# Patient Record
Sex: MALE | Race: WHITE | NOT HISPANIC OR LATINO | Employment: OTHER | URBAN - NONMETROPOLITAN AREA
[De-identification: names, ages, dates, MRNs, and addresses within clinical notes are randomized per-mention and may not be internally consistent; named-entity substitution may affect disease eponyms.]

---

## 2023-04-14 ENCOUNTER — MEDICAL CORRESPONDENCE (OUTPATIENT)
Dept: MRI IMAGING | Facility: HOSPITAL | Age: 73
End: 2023-04-14

## 2023-04-25 ENCOUNTER — HOSPITAL ENCOUNTER (OUTPATIENT)
Dept: MRI IMAGING | Facility: HOSPITAL | Age: 73
Discharge: HOME | End: 2023-04-25
Attending: SPECIALIST | Admitting: SPECIALIST
Payer: MEDICARE

## 2023-04-25 DIAGNOSIS — M25.562 PAIN AND SWELLING OF LEFT KNEE: ICD-10-CM

## 2023-04-25 DIAGNOSIS — M25.362 OTHER INSTABILITY, LEFT KNEE: ICD-10-CM

## 2023-04-25 DIAGNOSIS — M25.462 PAIN AND SWELLING OF LEFT KNEE: ICD-10-CM

## 2023-04-25 PROCEDURE — 73721 MRI JNT OF LWR EXTRE W/O DYE: CPT | Mod: LT

## 2023-10-02 ENCOUNTER — TRANSFERRED RECORDS (OUTPATIENT)
Dept: HEALTH INFORMATION MANAGEMENT | Facility: CLINIC | Age: 73
End: 2023-10-02

## 2023-10-02 LAB — PHQ9 SCORE: 0

## 2023-10-09 ENCOUNTER — MEDICAL CORRESPONDENCE (OUTPATIENT)
Dept: HEALTH INFORMATION MANAGEMENT | Facility: CLINIC | Age: 73
End: 2023-10-09

## 2023-10-09 ENCOUNTER — TRANSFERRED RECORDS (OUTPATIENT)
Dept: HEALTH INFORMATION MANAGEMENT | Facility: CLINIC | Age: 73
End: 2023-10-09

## 2023-10-09 LAB — COLOGUARD-ABSTRACT: POSITIVE

## 2023-11-13 RX ORDER — TRIAMTERENE AND HYDROCHLOROTHIAZIDE 37.5; 25 MG/1; MG/1
1 CAPSULE ORAL EVERY MORNING
COMMUNITY

## 2023-11-15 ENCOUNTER — OFFICE VISIT (OUTPATIENT)
Dept: SURGERY | Facility: OTHER | Age: 73
End: 2023-11-15
Attending: SURGERY
Payer: MEDICARE

## 2023-11-15 ENCOUNTER — DOCUMENTATION ONLY (OUTPATIENT)
Dept: OTHER | Facility: CLINIC | Age: 73
End: 2023-11-15

## 2023-11-15 VITALS
HEART RATE: 58 BPM | BODY MASS INDEX: 31.4 KG/M2 | HEIGHT: 69 IN | WEIGHT: 212 LBS | SYSTOLIC BLOOD PRESSURE: 136 MMHG | DIASTOLIC BLOOD PRESSURE: 84 MMHG | TEMPERATURE: 96.9 F | OXYGEN SATURATION: 98 %

## 2023-11-15 DIAGNOSIS — R19.5 POSITIVE COLORECTAL CANCER SCREENING USING COLOGUARD TEST: Primary | ICD-10-CM

## 2023-11-15 PROCEDURE — 99203 OFFICE O/P NEW LOW 30 MIN: CPT | Performed by: SURGERY

## 2023-11-15 PROCEDURE — G0463 HOSPITAL OUTPT CLINIC VISIT: HCPCS | Mod: 25

## 2023-11-15 RX ORDER — BISACODYL 5 MG/1
10 TABLET, DELAYED RELEASE ORAL SEE ADMIN INSTRUCTIONS
Qty: 4 TABLET | Refills: 0 | Status: CANCELLED | OUTPATIENT
Start: 2023-11-15

## 2023-11-15 RX ORDER — SILDENAFIL 100 MG/1
TABLET, FILM COATED ORAL
COMMUNITY
Start: 2023-11-05

## 2023-11-15 RX ORDER — IBUPROFEN 200 MG
200 TABLET ORAL DAILY
COMMUNITY

## 2023-11-15 ASSESSMENT — PAIN SCALES - GENERAL: PAINLEVEL: NO PAIN (0)

## 2023-11-15 NOTE — PROGRESS NOTES
"CLINIC NOTE - CONSULT  11/15/2023    Patient : Nicolas Malone  Referring Physician : No ref. provider found    Reason for Referral : Upper and lower endoscopy    This is a 72 year old male with a need for an upper and lower endoscopy.  Upper and lower endoscopy is needed for Positive Stool Guaiac.      Last EGD : never  History of GERD : NO  History of PUD : NO  On PPI's : NO   Drug and Dose : N/A  History of dysphagia : NO   Dysphagia to solids greater than liquids : Not Applicable  Hematemesis : NO  Melena : NO  Does take NSAIS daliy    Last colonoscopy : Never  Family history of colon cancer : NO  Family history of colon polyps : NO  Personal history of colon cancer : NO  Personal history of colon polyps : NO  Rectal bleeding : NO  Changes in bowel habits :NO  Personal history of inflammatory bowel disease : NO    Past Medical History:  No past medical history on file.    Past Surgical History:  No past surgical history on file.    Family History History:  No family history on file.    History of Tobacco Use:  History   Smoking Status    Never   Smokeless Tobacco    Never       Current Medications:  Current Outpatient Medications   Medication Sig Dispense Refill    ibuprofen (ADVIL/MOTRIN) 200 MG tablet Take 200 mg by mouth daily      sildenafil (VIAGRA) 100 MG tablet TAKE 1 TABLET BY MOUTH 1 TO 4 HOURS BEFORE SEXUAL ACTIVITY      triamterene-HCTZ (DYAZIDE) 37.5-25 MG capsule Take 1 capsule by mouth every morning         Allergies:  Allergies   Allergen Reactions    Penicillins      Childhood       ROS:  Pertinent items are noted in HPI.  All other systems are negative.    PHYSICAL EXAM:     Vital signs: /84 (BP Location: Right arm, Cuff Size: Adult Regular)   Pulse 58   Temp 96.9  F (36.1  C) (Tympanic)   Ht 1.753 m (5' 9\")   Wt 96.2 kg (212 lb)   SpO2 98%   BMI 31.31 kg/m     Weight: [unfilled]   BMI: Body mass index is 31.31 kg/m .   General: Normal, healthy, cooperative, in no acute distress, " alert   Skin: no jaundice   HEENT: CLOTILDE and PARVEEN   Neck: supple     Assessment:   72 year old male with need for upper endoscopy and lower endoscopy for Positive Stool Guaiac and a history of NSAID use:    Plan:   Will schedule an esophagogastroduodenoscopy and colonoscopy.  The procedures with their risks, benefits and alternatives were explained.  Risks include but are not limited to bleeding, perforation, missing lesions, need for additional procedures, reaction to anesthesia.  All the patients questions were answered.  The patient consents to proceed.  The procedures will be scheduled.

## 2023-11-15 NOTE — PATIENT INSTRUCTIONS
Thank you for allowing Dr. Waddell and our surgical team to participate in your care. Please call our health unit coordinator at [phone] with scheduling questions or the nurse at [phone] with any other questions or concerns.    You have been scheduled for: UPPER ENDOSCOPY AND COLONOSCOPY with  on NOVEMBER 20TH    SURGERY NURSE TO CALL NOVEMBER 16TH 10:30AM    .   You will use MIRALAX  bowel prep.      Please see handout for additional instruction.  You WILL NOT  need a pre-operative appointment with your primary care provider.  You may call [phone] or [phone] with any questions.

## 2023-11-16 ENCOUNTER — ANESTHESIA EVENT (OUTPATIENT)
Dept: SURGERY | Facility: HOSPITAL | Age: 73
End: 2023-11-16
Payer: MEDICARE

## 2023-11-16 ASSESSMENT — LIFESTYLE VARIABLES: TOBACCO_USE: 1
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
## 2023-11-20 ENCOUNTER — ANESTHESIA (OUTPATIENT)
Dept: SURGERY | Facility: HOSPITAL | Age: 73
End: 2023-11-20
Payer: MEDICARE

## 2023-11-20 ENCOUNTER — HOSPITAL ENCOUNTER (OUTPATIENT)
Facility: HOSPITAL | Age: 73
Discharge: HOME | End: 2023-11-20
Attending: SURGERY | Admitting: SURGERY
Payer: MEDICARE

## 2023-11-20 VITALS
TEMPERATURE: 97.1 F | SYSTOLIC BLOOD PRESSURE: 152 MMHG | WEIGHT: 215 LBS | RESPIRATION RATE: 18 BRPM | OXYGEN SATURATION: 95 % | HEIGHT: 68 IN | DIASTOLIC BLOOD PRESSURE: 88 MMHG | BODY MASS INDEX: 32.58 KG/M2 | HEART RATE: 53 BPM

## 2023-11-20 PROCEDURE — 250N000011 HC RX IP 250 OP 636: Performed by: NURSE ANESTHETIST, CERTIFIED REGISTERED

## 2023-11-20 PROCEDURE — 250N000009 HC RX 250: Performed by: NURSE ANESTHETIST, CERTIFIED REGISTERED

## 2023-11-20 PROCEDURE — 88341 IMHCHEM/IMCYTCHM EA ADD ANTB: CPT | Mod: 26 | Performed by: PATHOLOGY

## 2023-11-20 PROCEDURE — 45385 COLONOSCOPY W/LESION REMOVAL: CPT | Mod: PT | Performed by: SURGERY

## 2023-11-20 PROCEDURE — 88305 TISSUE EXAM BY PATHOLOGIST: CPT | Mod: TC | Performed by: SURGERY

## 2023-11-20 PROCEDURE — 88305 TISSUE EXAM BY PATHOLOGIST: CPT | Mod: 26 | Performed by: PATHOLOGY

## 2023-11-20 PROCEDURE — 99100 ANES PT EXTEME AGE<1 YR&>70: CPT | Performed by: NURSE ANESTHETIST, CERTIFIED REGISTERED

## 2023-11-20 PROCEDURE — 370N000017 HC ANESTHESIA TECHNICAL FEE, PER MIN: Performed by: SURGERY

## 2023-11-20 PROCEDURE — 710N000012 HC RECOVERY PHASE 2, PER MINUTE: Performed by: SURGERY

## 2023-11-20 PROCEDURE — 272N000001 HC OR GENERAL SUPPLY STERILE: Performed by: SURGERY

## 2023-11-20 PROCEDURE — 45385 COLONOSCOPY W/LESION REMOVAL: CPT | Performed by: NURSE ANESTHETIST, CERTIFIED REGISTERED

## 2023-11-20 PROCEDURE — 360N000075 HC SURGERY LEVEL 2, PER MIN: Performed by: SURGERY

## 2023-11-20 PROCEDURE — 88342 IMHCHEM/IMCYTCHM 1ST ANTB: CPT | Mod: 26 | Performed by: PATHOLOGY

## 2023-11-20 PROCEDURE — 999N000141 HC STATISTIC PRE-PROCEDURE NURSING ASSESSMENT: Performed by: SURGERY

## 2023-11-20 PROCEDURE — 43239 EGD BIOPSY SINGLE/MULTIPLE: CPT | Performed by: SURGERY

## 2023-11-20 RX ORDER — ONDANSETRON 4 MG/1
4 TABLET, ORALLY DISINTEGRATING ORAL EVERY 30 MIN PRN
Status: DISCONTINUED | OUTPATIENT
Start: 2023-11-20 | End: 2023-11-20 | Stop reason: HOSPADM

## 2023-11-20 RX ORDER — LIDOCAINE 40 MG/G
CREAM TOPICAL
Status: DISCONTINUED | OUTPATIENT
Start: 2023-11-20 | End: 2023-11-20 | Stop reason: HOSPADM

## 2023-11-20 RX ORDER — GLYCOPYRROLATE 0.2 MG/ML
INJECTION, SOLUTION INTRAMUSCULAR; INTRAVENOUS PRN
Status: DISCONTINUED | OUTPATIENT
Start: 2023-11-20 | End: 2023-11-20

## 2023-11-20 RX ORDER — PROPOFOL 10 MG/ML
INJECTION, EMULSION INTRAVENOUS PRN
Status: DISCONTINUED | OUTPATIENT
Start: 2023-11-20 | End: 2023-11-20

## 2023-11-20 RX ORDER — ONDANSETRON 2 MG/ML
4 INJECTION INTRAMUSCULAR; INTRAVENOUS EVERY 30 MIN PRN
Status: DISCONTINUED | OUTPATIENT
Start: 2023-11-20 | End: 2023-11-20 | Stop reason: HOSPADM

## 2023-11-20 RX ORDER — OXYCODONE HYDROCHLORIDE 5 MG/1
5 TABLET ORAL
Status: CANCELLED | OUTPATIENT
Start: 2023-11-20

## 2023-11-20 RX ORDER — SODIUM CHLORIDE, SODIUM LACTATE, POTASSIUM CHLORIDE, CALCIUM CHLORIDE 600; 310; 30; 20 MG/100ML; MG/100ML; MG/100ML; MG/100ML
INJECTION, SOLUTION INTRAVENOUS CONTINUOUS
Status: DISCONTINUED | OUTPATIENT
Start: 2023-11-20 | End: 2023-11-20 | Stop reason: HOSPADM

## 2023-11-20 RX ADMIN — PROPOFOL 10 MG: 10 INJECTION, EMULSION INTRAVENOUS at 12:46

## 2023-11-20 RX ADMIN — PROPOFOL 10 MG: 10 INJECTION, EMULSION INTRAVENOUS at 12:44

## 2023-11-20 RX ADMIN — PROPOFOL 20 MG: 10 INJECTION, EMULSION INTRAVENOUS at 12:48

## 2023-11-20 RX ADMIN — PROPOFOL 10 MG: 10 INJECTION, EMULSION INTRAVENOUS at 12:54

## 2023-11-20 RX ADMIN — PROPOFOL 10 MG: 10 INJECTION, EMULSION INTRAVENOUS at 12:53

## 2023-11-20 RX ADMIN — PROPOFOL 10 MG: 10 INJECTION, EMULSION INTRAVENOUS at 12:42

## 2023-11-20 RX ADMIN — PROPOFOL 20 MG: 10 INJECTION, EMULSION INTRAVENOUS at 12:43

## 2023-11-20 RX ADMIN — PROPOFOL 20 MG: 10 INJECTION, EMULSION INTRAVENOUS at 12:52

## 2023-11-20 RX ADMIN — PROPOFOL 20 MG: 10 INJECTION, EMULSION INTRAVENOUS at 12:50

## 2023-11-20 RX ADMIN — PROPOFOL 100 MG: 10 INJECTION, EMULSION INTRAVENOUS at 12:41

## 2023-11-20 RX ADMIN — PROPOFOL 10 MG: 10 INJECTION, EMULSION INTRAVENOUS at 12:47

## 2023-11-20 RX ADMIN — PROPOFOL 10 MG: 10 INJECTION, EMULSION INTRAVENOUS at 12:45

## 2023-11-20 RX ADMIN — PROPOFOL 20 MG: 10 INJECTION, EMULSION INTRAVENOUS at 12:51

## 2023-11-20 RX ADMIN — PROPOFOL 20 MG: 10 INJECTION, EMULSION INTRAVENOUS at 12:49

## 2023-11-20 RX ADMIN — GLYCOPYRROLATE 0.2 MG: 0.2 INJECTION, SOLUTION INTRAMUSCULAR; INTRAVENOUS at 12:55

## 2023-11-20 ASSESSMENT — ACTIVITIES OF DAILY LIVING (ADL): ADLS_ACUITY_SCORE: 35

## 2023-11-20 NOTE — ANESTHESIA POSTPROCEDURE EVALUATION
Patient: Nicolas Malone    Procedure: Procedure(s):  COLONOSCOPY with polypectomy,  EGD WITH BIOPSY       Anesthesia Type:  MAC    Note:  Disposition: Outpatient   Postop Pain Control: Uneventful            Sign Out: Well controlled pain   PONV: No   Neuro/Psych: Uneventful            Sign Out: Acceptable/Baseline neuro status   Airway/Respiratory: Uneventful            Sign Out: Acceptable/Baseline resp. status   CV/Hemodynamics: Uneventful            Sign Out: Acceptable CV status; No obvious hypovolemia; No obvious fluid overload   Other NRE: NONE   DID A NON-ROUTINE EVENT OCCUR? No         Last vitals:  Vitals Value Taken Time   /98 11/20/23 1330   Temp 97.1  F (36.2  C) 11/20/23 1316   Pulse 56 11/20/23 1330   Resp 16 11/20/23 1316   SpO2 98 % 11/20/23 1333   Vitals shown include unfiled device data.    Electronically Signed By: ANDREINA Mclain CRNA  November 20, 2023  1:34 PM

## 2023-11-20 NOTE — DISCHARGE INSTRUCTIONS
UPPER ENDOSCOPY    AFTER THE PROCEDURE  You will return to Same Day Surgery to rest for about an hour before you go home.  The doctor will talk with you and your family.  A family member/friend may visit with you.  You may burp up any air remaining in your stomach.  You may feel dizzy or light-headed from the medicine.  Your nurse will go over the discharge instructions with you and your caregiver and answer any of your questions.    You will be contacted the next day to check on how you are doing.  If biopsies were taken, you will be contacted with the results usually within 7-10 days.  BACK AT HOME  Rest for an hour or two after you get home.  When your throat is no longer numb and you have a gag reflex, take a few sips of cool water.  If you can swallow comfortably, you may start eating again.  You may have a mild sore throat for the rest of the day.  You will be contacted with results by the surgeons office within a week. If not contacted in one week, call the surgeons office.  WHAT TO WATCH FOR:  Problems rarely occur after the exam, but it is important to be aware of the early signs of a complication.  Call your doctor immediately if you have:  Difficulty swallowing or breathing  Unusual pain in your stomach or chest  Vomiting blood or dark material that looks like coffee grounds  Black or tarry stools  Temperature over 101.5 degrees    MORE QUESTIONS?  Please ask your doctor or nurse before the exam begins  or call your doctor at the clinic.    IF YOU MUST CANCEL YOUR PROCEDURE THE EVENING/NIGHT BEFORE, PLEASE CALL HOSPITAL ADMITTING -[phone] OR TOLL FREE [phone], [phone].    Phone Numbers:  [facility] - [phone] [phone]  [facility] - [phone]  Surgery Patient Education - [phone] or toll free [phone]        INSTRUCTIONS AFTER COLONOSCOPY    WHEN YOU ARE BACK HOME:  Plan to rest for an hour or two after you get home.  You may have some cramping or pressure until  you pass gas.  You may resume your regular medications.  Eat a small, light meal at first, and then gradually return to normal meal sizes.  You will be contacted the next day to see how you are doing.  If you had a polyp removed:  Slight bleeding may occur.  You may have a slight blood stain on the toilet paper after a bowel movement.  To lessen the chance of bleeding, avoid heavy exercise for ONE WEEK.  This includes heavy lifting, vigorous sport activities, and heavy physical labor.  You may resume your normal sexual activity.    Avoid aspirin or aspirin products if instructed by your doctor.  If there is a polyp or biopsy, you will be contacted with results within 7-10 days.     WHAT TO WATCH FOR:  Problems rarely occur after the exam; however, it is important for you to watch for early signs of possible problems.  If you have   Unusual pain in your abdomen  Nausea and vomiting that persists  Excessive bleeding  Black or bloody bowel movements  Fever or temperature above 100.6 F  Please call your doctor ([facility] [phone]) or go to the nearest hospital emergency room.      Post-Anesthesia Patient Instructions    IMMEDIATELY FOLLOWING SURGERY:  Do not drive or operate machinery for the first twenty four hours after surgery.  Do not make any important decisions for twenty four hours after surgery or while taking narcotic pain medications or sedatives.  If you develop intractable nausea and vomiting or a severe headache please notify your doctor immediately.    FOLLOW-UP:  Please make an appointment with your surgeon as instructed. You do not need to follow up with anesthesia unless specifically instructed to do so.    WOUND CARE INSTRUCTIONS (if applicable):  Keep a dry clean dressing on the anesthesia/puncture wound site if there is drainage.  Once the wound has quit draining you may leave it open to air.  Generally you should leave the bandage intact for twenty four hours unless there is drainage.  If  the epidural site drains for more than 36-48 hours please call the anesthesia department.    QUESTIONS?:  Please feel free to call your physician or the hospital  if you have any questions, and they will be happy to assist you.

## 2023-11-20 NOTE — OP NOTE
REPORT OF OPERATION  DATE OF PROCEDURE: 11/20/2023    PATIENT: Nicoals Malone    SURGERY PERFORMED:    Esophagogastroduodenoscopy with biopsies   Colonoscopy     with biopsy    with use of snare      PREOPERATIVE DIAGNOSIS:   History of NSAID use   Positive ColoGuard    POSTOPERATIVE DIAGNOSIS:    Normal Esophagogastroduodenoscopy   Squamous columnar junction at 40   Colon mass, Rectum   Diverticulosis was not identified.   Hemorrhoids  were  identified.    SURGEON: Greg Waddell MD    ASSISTANTS: None    ANESTHESIA: Monitored Anesthesia Care    COMPLICATIONS: None apparent    TRANSFUSIONS: None    TISSUE TO PATHOLOGY:    Duodenal, Antral, and Distal Esophageal   Mass from Rectum    FINDINGS:   Normal Esophagogastroduodenoscopy   Colon mass, Rectum   Diverticulosis was not identified.   Hemorrhoids  were  identified.    INDICATIONS: This is a 73 year old male in need of an Esophagogastroduodenoscopy for  a history of NSAID use  and a colonoscohistory for Positive ColoGuard.  The patient will be taken to the endoscopy suite for those procedures.    DESCRIPTIONS OF PROCEDURE IN DETAIL: After consent was obtained the patient was taken to the operative suite and letty in the left lateral decubitus position.  The patient was identified and the correct patient was confirmed. Monitored Anesthesia Care was given by anesthesia. A time out was performed verifying the correct patient and the correct procedure.  The entire operative team was in agreement.  All necessary equipment and supplies were in the room.    The endoscope was inserted into the mouth and passed without difficulty to the third portion of the duodenum.  Duodenal biopsies were taken.  The endoscope was then withdrawn through the duodenum, the duodenal bulb and pyloric channel and no abnormalities were noted.  The endoscope was brought back into the stomach and antral biopsies were obtained.  The endoscope was then retroflexed and no lesions of the fundus body  or antrum were seen.  The endoscope was straightened back out and brought into the distal esophagus and a well-defined squamocolumnar junction was identified at 40 cm. Biopsies of the distal esophagus were taken.  The endoscope was slowly withdrawn through the remaining esophagus no other abnormalities are seen,  The endoscope was withdrawn from the patient and the patient was positioned for colonoscopy.    Rectal exam was performed and a mass was palpated at the distal aspect of my finger length or proximally 5 cm.  The colonoscope was inserted into the anus and passed without difficulty to the cecum.  The cecum was identified by the ileocecal valve, the coalescence of the tinea and the appendiceal orifice.  Upon withdrawal all walls of the colon were visualized.  A mass was identified in the rectum.  A large piece was taken using the snare.  Colitis were not seen.colon  Diverticulosis was not seen.  Upon reaching the rectum the scope was retroflexed and internal hemorrhoids  were  seen.  The scope was straightened back out and removed from the patient.  The patient was then taken to the recovery room in stable condition tolerating the procedure well.      Prep: poor    Withdrawal time was 6 minutes.

## 2023-11-20 NOTE — ANESTHESIA CARE TRANSFER NOTE
Patient: Nicolas Malone    Procedure: Procedure(s):  COLONOSCOPY with polypectomy,  EGD WITH BIOPSY       Diagnosis: Positive colorectal cancer screening using Cologuard test [R19.5]  Diagnosis Additional Information: No value filed.    Anesthesia Type:   MAC     Note:    Oropharynx: oropharynx clear of all foreign objects  Level of Consciousness: awake  Oxygen Supplementation: room air    Independent Airway: airway patency satisfactory and stable  Dentition: dentition unchanged  Vital Signs Stable: post-procedure vital signs reviewed and stable  Report to RN Given: handoff report given  Patient transferred to: Phase II    Handoff Report: Identifed the Patient, Identified the Reponsible Provider, Reviewed the pertinent medical history, Discussed the surgical course, Reviewed Intra-OP anesthesia mangement and issues during anesthesia, Set expectations for post-procedure period and Allowed opportunity for questions and acknowledgement of understanding  Vitals:  Vitals Value Taken Time   /76 11/20/23 1314   Temp     Pulse 58 11/20/23 1314   Resp     SpO2 98 % 11/20/23 1315   Vitals shown include unfiled device data.    Electronically Signed By: ANDREINA Riley CRNA  November 20, 2023  1:16 PM

## 2023-11-20 NOTE — H&P
"HISTORY AND PHYSICAL - ENDOSCOPY   11/20/2023    Patient : Nicolas Malone    Planned Procedures : Endoscopy    This is a 73 year old male with a need for an upper and lower endoscopy.  Upper endoscopy is needed for History of Peptic Ulcer Disease.  Lower endoscopy is needed for Positive ColoGuard.      Past Medical History:  History reviewed. No pertinent past medical history.    Past Surgical History:  History reviewed. No pertinent surgical history.    Family History History:  History reviewed. No pertinent family history.    History of Tobacco Use:  History   Smoking Status    Never   Smokeless Tobacco    Never       Current Medications:  No current outpatient medications on file.       Allergies:  Allergies   Allergen Reactions    Penicillins      Childhood       ROS:  Pertinent items are noted in HPI.  All other systems are negative.    PHYSICAL EXAM:     Vital signs: BP (!) 185/96   Pulse 68   Temp 97.1  F (36.2  C) (Tympanic)   Resp 14   Ht 1.727 m (5' 8\")   Wt 97.5 kg (215 lb)   SpO2 97%   BMI 32.69 kg/m     Weight: [unfilled]   BMI: Body mass index is 32.69 kg/m .   General: Normal, healthy, cooperative, in no acute distress, alert   Skin: no jaundice   HEENT: PERRLA and EOMI   Neck: supple   Lungs: clear to auscultation   CV: Regular rate and rhythm without murmer   Abdominal: abdomen is soft without significant tenderness, masses, organomegaly or guarding   Extremities: No cyanosis, clubbing or edema noted bilaterally in Upper and Lower Extremities   Neurological: without deficit    Assessment:   73 year old male with need for upper endoscopy for History of Peptic Ulcer Disease and lower endoscopy for Positive ColoGuard:    Plan:   Will schedule an esophagogastroduodenoscopy and colonoscopy.      The risks, benefits, and alternatives to the planned procedure were fully discussed with the patient and/or the patient's representative(s). The risks of bleeding, infection, death, missing pathology, the need " for additional procedures intra-operatively, the possible need for intra-operative consults, the possible need for transfusion therapy, cardiopulmonary compromise, the possible need for additional surgery for a complication were discussed with the patient and/or the patient's representative(s). The patient's and/or patient's representative(s) questions were addressed and answered. Informed consent was obtained from the patient and/or the patient's representative(s). The patient and/or the patient's representative(s) consent to proceed.    Specific risks:  Risks include but are not limited to bleeding, perforation, missing lesions, need for additional procedures, reaction to anesthesia.  All the patients questions were answered.  The patient consents to proceed.  The procedures will be scheduled.

## 2023-11-22 ENCOUNTER — OFFICE VISIT (OUTPATIENT)
Dept: SURGERY | Facility: OTHER | Age: 73
End: 2023-11-22
Attending: SURGERY
Payer: MEDICARE

## 2023-11-22 ENCOUNTER — TRANSCRIBE ORDERS (OUTPATIENT)
Dept: SURGERY | Facility: OTHER | Age: 73
End: 2023-11-22

## 2023-11-22 VITALS
BODY MASS INDEX: 32.58 KG/M2 | SYSTOLIC BLOOD PRESSURE: 144 MMHG | TEMPERATURE: 97.4 F | DIASTOLIC BLOOD PRESSURE: 76 MMHG | OXYGEN SATURATION: 98 % | HEART RATE: 72 BPM | WEIGHT: 215 LBS | RESPIRATION RATE: 15 BRPM | HEIGHT: 68 IN

## 2023-11-22 DIAGNOSIS — C20 RECTAL CANCER (H): Primary | ICD-10-CM

## 2023-11-22 LAB
BASOPHILS # BLD AUTO: 0.1 10E3/UL (ref 0–0.2)
BASOPHILS NFR BLD AUTO: 1 %
EOSINOPHIL # BLD AUTO: 0.5 10E3/UL (ref 0–0.7)
EOSINOPHIL NFR BLD AUTO: 7 %
ERYTHROCYTE [DISTWIDTH] IN BLOOD BY AUTOMATED COUNT: 12.2 % (ref 10–15)
HCT VFR BLD AUTO: 42.3 % (ref 40–53)
HGB BLD-MCNC: 14.7 G/DL (ref 13.3–17.7)
IMM GRANULOCYTES # BLD: 0 10E3/UL
IMM GRANULOCYTES NFR BLD: 0 %
LYMPHOCYTES # BLD AUTO: 1.7 10E3/UL (ref 0.8–5.3)
LYMPHOCYTES NFR BLD AUTO: 25 %
MCH RBC QN AUTO: 30.7 PG (ref 26.5–33)
MCHC RBC AUTO-ENTMCNC: 34.8 G/DL (ref 31.5–36.5)
MCV RBC AUTO: 88 FL (ref 78–100)
MONOCYTES # BLD AUTO: 0.7 10E3/UL (ref 0–1.3)
MONOCYTES NFR BLD AUTO: 10 %
NEUTROPHILS # BLD AUTO: 4 10E3/UL (ref 1.6–8.3)
NEUTROPHILS NFR BLD AUTO: 57 %
NRBC # BLD AUTO: 0 10E3/UL
NRBC BLD AUTO-RTO: 0 /100
PLATELET # BLD AUTO: 196 10E3/UL (ref 150–450)
RBC # BLD AUTO: 4.79 10E6/UL (ref 4.4–5.9)
WBC # BLD AUTO: 7.1 10E3/UL (ref 4–11)

## 2023-11-22 PROCEDURE — G0463 HOSPITAL OUTPT CLINIC VISIT: HCPCS

## 2023-11-22 PROCEDURE — 82378 CARCINOEMBRYONIC ANTIGEN: CPT | Mod: ZL | Performed by: SURGERY

## 2023-11-22 PROCEDURE — 99213 OFFICE O/P EST LOW 20 MIN: CPT | Performed by: SURGERY

## 2023-11-22 PROCEDURE — 85025 COMPLETE CBC W/AUTO DIFF WBC: CPT | Mod: ZL | Performed by: SURGERY

## 2023-11-22 PROCEDURE — 36415 COLL VENOUS BLD VENIPUNCTURE: CPT | Mod: ZL | Performed by: SURGERY

## 2023-11-22 ASSESSMENT — PAIN SCALES - GENERAL: PAINLEVEL: NO PAIN (0)

## 2023-11-22 NOTE — PROGRESS NOTES
"CLINIC NOTE - POST-OP ENDOSCOPY  11/22/2023    Patient:Nicolas Malone    Procedure: Esophagogastroduodenoscopy with biopsy and Colonoscopy with biopsies    This is a 73 year old male who is 1 weeks s/p Esophagogastroduodenoscopy with biopsy and Colonoscopy with biopsies.  At the time of endoscopy a mass in the rectum was noted.  Initial pathology shows rectal cancer.     Current Medications:  Current Outpatient Medications   Medication Sig Dispense Refill    ibuprofen (ADVIL/MOTRIN) 200 MG tablet Take 200 mg by mouth daily      sildenafil (VIAGRA) 100 MG tablet TAKE 1 TABLET BY MOUTH 1 TO 4 HOURS BEFORE SEXUAL ACTIVITY      triamterene-HCTZ (DYAZIDE) 37.5-25 MG capsule Take 1 capsule by mouth every morning         Allergies:  Allergies   Allergen Reactions    Penicillins      Childhood       PHYSICAL EXAM:   Vital signs: BP (!) 144/76 (BP Location: Left arm, Cuff Size: Adult Large)   Pulse 72   Temp 97.4  F (36.3  C) (Tympanic)   Resp 15   Ht 1.727 m (5' 8\")   Wt 97.5 kg (215 lb)   SpO2 98%   BMI 32.69 kg/m     Weight: [unfilled]   BMI: Body mass index is 32.69 kg/m .   General: Normal, healthy, cooperative, in no acute distress   Lungs: clear to auscultation   CV: Regular rate and rhythm without murmer   Abdominal: abdomen is soft without significant tenderness, masses, organomegaly or guarding     ASSESSMENT:    73 year old male who is 1 weeks s/p Esophagogastroduodenoscopy with biopsy and Colonoscopy with biopsies.  Pathology still pending.  On discussion with the pathologist it does show rectal cancer.    PLAN:   Have discussed the case with Dr. Tommy Rodriguez to the [facility].  He will see the patient in consult.  Will go ahead and get an MRI of the pelvis, CT of the abdomen pelvis, CEA and CBC today.   "

## 2023-11-23 LAB — CEA SERPL-MCNC: 5.8 NG/ML

## 2023-11-24 ENCOUNTER — PATIENT OUTREACH (OUTPATIENT)
Dept: SURGERY | Facility: CLINIC | Age: 73
End: 2023-11-24

## 2023-11-24 NOTE — PROGRESS NOTES
Patient was called after receiving a referral from Dr. Waddell for rectal cancer. Patient will complete staging.     CT scan: Yes  11/28                  MRI: Yes 11/28  Blood work:Yes  11/22    Insurance Carrier: [facility]    Are images able/being pushed to our system? Yes    Colonoscopy Report: Yes         Pathology Report: Yes     Patient was offered a clinic appointment with Dr. Rodriguez on 11/30. Patient is in agreement with this appointment date, time, and location. Patient's questions and concerns were addressed to his stated satisfaction. Patient is in agreement with this plan of care.

## 2023-11-24 NOTE — TELEPHONE ENCOUNTER
Diagnosis, Referred by & from: Rectal Cancer, Flex Sig   Appt date: 11/30/2023   NOTES STATUS DETAILS   OFFICE NOTE from referring provider [facility] [place]:  ([facility]) 11/29/23, 11/22/23 - GEN SURG OV with Dr. Waddell   OFFICE NOTE from other specialist Received [facility]:  10/2/23 - PCC OV with Dr. Saha   DISCHARGE SUMMARY from hospital N/A    DISCHARGE REPORT from the ER N/A    OPERATIVE REPORT N/A    MEDICATION LIST Internal    LABS     ANAL PAP/CEA [facility]:  11/22/23 - CEA   BIOPSIES/PATHOLOGY RELATED TO DIAGNOSIS [facility] [place]:  11/20/23 - Rectum Biopsy (Case: [id number])   DIAGNOSTIC PROCEDURES     COLONOSCOPY [facility]:  11/20/23 - Colonoscopy   UPPER ENDOSCOPY (EGD) [facility]:  11/20/23 - EGD   IMAGING (DISC & REPORT)      CT [facility]:  ([facility]) 11/28/23 - CT Chest/Abd/Pelvis   MRI [facility]:  ([facility]) 11/28/23 - MRI Pelvis

## 2023-11-24 NOTE — PROGRESS NOTES
Colon and Rectal Surgery Clinic Note    RE: Nicolas Malone.  : 1950.  ROXIE: 2023.    Reason for visit: rectal cancer.      HPI: Nicolas Malone is a 73 year old male who presents today for rectal cancer. He has no significant past medical history. On 2023 he underwent a colonoscopy for a positive cologaurd test. Colonoscopy demonstrated a rectal mass. Biopsy was positive for moderately differentiated adenocarcinoma, MMR intact. CEA 5.8. CT Chest/Abdomen/Pelvis on 2023 showed two adjacent deep pelvic lymph nodes measuring 6 and 7mm suspicious for ayla disease. MRI rectum on 2023 showed a 2cm rectal mass without evidence of invasion into the perirectal fat or adjacent structures.                                                                     Colonoscopy (2023):  Rectal exam was performed and a mass was palpated at the distal aspect of my finger length or proximally 5 cm.  The colonoscope was inserted into the anus and passed without difficulty to the cecum.  The cecum was identified by the ileocecal valve, the coalescence of the tinea and the appendiceal orifice.  Upon withdrawal all walls of the colon were visualized.  A mass was identified in the rectum.  A large piece was taken using the snare.  Colitis were not seen.colon  Diverticulosis was not seen.  Upon reaching the rectum the scope was retroflexed and internal hemorrhoids  were  seen.  The scope was straightened back out and removed from the patient.  The patient was then taken to the recovery room in stable condition tolerating the procedure well.       Surgical Pathology (2023):  A.  Duodenum, biopsy:  -Duodenal mucosa with no diagnostic abnormality.  B.  Stomach, antrum, biopsy:  -Antral mucosa with no diagnostic abnormality.  C.  Esophagus, distal, biopsy:  -Squamous mucosa with no diagnostic abnormality.  D.  Rectum, lesion, biopsy:  -Invasive moderately-differentiated adenocarcinoma with focal background features  of a tubulovillous adenoma.  -Tumor present at inked deep margin.  -Small separate mucosal fragment with features of a hyperplastic polyp.  -See comment..  Mismatch Repair     Immunohistochemistry (IHC) Testing for Mismatch Repair (MMR) Proteins     MLH1 Result  Intact nuclear expression   Immunohistochemistry (IHC) Testing for Mismatch Repair (MMR) Proteins     MSH2 Result  Intact nuclear expression   Immunohistochemistry (IHC) Testing for Mismatch Repair (MMR) Proteins     MSH6 Result  Intact nuclear expression   Immunohistochemistry (IHC) Testing for Mismatch Repair (MMR) Proteins     PMS2 Result  Intact nuclear expression   Immunohistochemistry (IHC) Testing for Mismatch Repair (MMR) Proteins  Background nonneoplastic tissue / internal control with intact nuclear expression   IHC Interpretation  No loss of nuclear expression of MMR proteins: low probability of MSI-H       CEA (11/22/2023):   CEA  ng/mL 5.8     CT Chest/Abdomen/Pelvis (11/28/2023):  IMPRESSION:     Left posterior rectal mucosal mass measuring up to 26 mm.    2 adjacent deep pelvic lymph nodes measure 7 and 6 mm, with rounded  contour, nonspecific, but suspicious for local ayla involvement.   Multiple small pulmonary nodules are highly nonspecific.    MRI Rectum (11/28/2023):                                IMPRESSION:  2 cm rectal mass without evidence of invasion into the perirectal fat or adjacent structures.  Two subcentimeter deep left pelvic lymph nodes are nonspecific.    Medical history:  -none    Surgical history:  -none    Family history:  No Hx of IBD or GI malignancy    Medications:  Current Outpatient Medications   Medication Sig Dispense Refill    ibuprofen (ADVIL/MOTRIN) 200 MG tablet Take 200 mg by mouth daily      sildenafil (VIAGRA) 100 MG tablet TAKE 1 TABLET BY MOUTH 1 TO 4 HOURS BEFORE SEXUAL ACTIVITY      triamterene-HCTZ (DYAZIDE) 37.5-25 MG capsule Take 1 capsule by mouth every morning         Allergies:  Allergies  "  Allergen Reactions    Penicillins      Childhood       Social history:   Social History     Tobacco Use    Smoking status: Never    Smokeless tobacco: Never   Substance Use Topics    Alcohol use: Yes     Comment: rarely     Marital status: unknown.    ROS:  A complete review of systems was performed with the patient and all systems negative except as per HPI.    Physical Examination:  Exam was chaperoned by Joanna Meredith CMA  BP (!) 161/80 (BP Location: Left arm, Patient Position: Sitting, Cuff Size: Adult Large)   Pulse 58   Ht 1.727 m (5' 8\")   Wt 96.3 kg (212 lb 6.4 oz)   SpO2 97%   BMI 32.30 kg/m    General: Well hydrated. No acute distress.  Abdomen: Soft, NT, ND, well healed midline incision from prior trauma.. No inguinal adenopathy palpated.  Perianal external examination:  Perianal skin: intact.  Lesions: No.  Eversion of buttocks: There was not evidence of an anal fissure. Details: N/A.  Skin tags or external hemorrhoids: None.  Digital rectal examination: Was performed.   Sphincter tone: Good.  Palpable lesions: No.  Other: None.  Bimanual examination: was not performed.    Anoscopy: Was performed.   Hemorrhoids: No significant internal hemorrhoids.  Lesions: No    Procedures:  Prior to the start of the procedure and with procedural staff participation, I verbally confirmed the patient s identity using two indicators, relevant allergies, that the procedure was appropriate and matched the consent or emergent situation, and that the correct equipment/implants were available. Immediately prior to starting the procedure I conducted the Time Out with the procedural staff and re-confirmed the patient s name, procedure, and site/side. (The Joint Commission universal protocol was followed.)  Yes    Sedation (Moderate or Deep): None    Flexible sigmoidoscopy was performed to 15 cm. 2 cm was identified (left posterior laterality), oozing, ulcerated. This was non-obstructing . Pictures taken, uploaded into " epic.    ASSESSMENT  1. Rectal cancer  2. Hypertension    PLAN  1. Will discuss at tumor board to identify T and N stage. Will obtain overreads. Surgery vs VONDA will depend on tumor stage, but will plan for med onc and rad onc referrals.     Risks, benefits, and alternatives of operative treatment were thoroughly discussed with the patient, he understands these well and agrees to proceed.    Time spent: 45 minutes, an additional 15 minutes were spent doing flex sig procedure.  >50% spent in discussing, counseling and coordinating care.    Tommy Rodriguez M.D    Division of Colon and Rectal Surgery  [facility]    Referring Provider:  Greg Waddell MD  [address] [place] [id number]     Primary Care Provider:  No Ref-Primary, Physician

## 2023-11-28 ENCOUNTER — HOSPITAL ENCOUNTER (OUTPATIENT)
Dept: CT IMAGING | Facility: HOSPITAL | Age: 73
Discharge: HOME | End: 2023-11-28
Attending: SURGERY
Payer: MEDICARE

## 2023-11-28 ENCOUNTER — HOSPITAL ENCOUNTER (OUTPATIENT)
Dept: MRI IMAGING | Facility: HOSPITAL | Age: 73
Discharge: HOME | End: 2023-11-28
Attending: SURGERY
Payer: MEDICARE

## 2023-11-28 DIAGNOSIS — C20 RECTAL CANCER (H): ICD-10-CM

## 2023-11-28 LAB
PATH REPORT.COMMENTS IMP SPEC: ABNORMAL
PATH REPORT.COMMENTS IMP SPEC: ABNORMAL
PATH REPORT.COMMENTS IMP SPEC: YES
PATH REPORT.FINAL DX SPEC: ABNORMAL
PATH REPORT.GROSS SPEC: ABNORMAL
PATH REPORT.MICROSCOPIC SPEC OTHER STN: ABNORMAL
PATH REPORT.RELEVANT HX SPEC: ABNORMAL
PATHOLOGY SYNOPTIC REPORT: ABNORMAL
PHOTO IMAGE: ABNORMAL

## 2023-11-28 PROCEDURE — 255N000002 HC RX 255 OP 636: Mod: JZ | Performed by: RADIOLOGY

## 2023-11-28 PROCEDURE — A9585 GADOBUTROL INJECTION: HCPCS | Mod: JZ | Performed by: RADIOLOGY

## 2023-11-28 PROCEDURE — 74177 CT ABD & PELVIS W/CONTRAST: CPT | Mod: MG

## 2023-11-28 PROCEDURE — 82565 ASSAY OF CREATININE: CPT

## 2023-11-28 PROCEDURE — 72197 MRI PELVIS W/O & W/DYE: CPT | Mod: MG

## 2023-11-28 PROCEDURE — 250N000011 HC RX IP 250 OP 636: Performed by: RADIOLOGY

## 2023-11-28 RX ORDER — GADOBUTROL 604.72 MG/ML
10 INJECTION INTRAVENOUS ONCE
Status: COMPLETED | OUTPATIENT
Start: 2023-11-28 | End: 2023-11-28

## 2023-11-28 RX ORDER — IOPAMIDOL 755 MG/ML
106 INJECTION, SOLUTION INTRAVASCULAR ONCE
Status: COMPLETED | OUTPATIENT
Start: 2023-11-28 | End: 2023-11-28

## 2023-11-28 RX ADMIN — GADOBUTROL 10 ML: 604.72 INJECTION INTRAVENOUS at 14:30

## 2023-11-28 RX ADMIN — IOPAMIDOL 106 ML: 755 INJECTION, SOLUTION INTRAVENOUS at 13:22

## 2023-11-29 ENCOUNTER — OFFICE VISIT (OUTPATIENT)
Dept: SURGERY | Facility: OTHER | Age: 73
End: 2023-11-29
Attending: SURGERY
Payer: MEDICARE

## 2023-11-29 VITALS
HEIGHT: 68 IN | RESPIRATION RATE: 16 BRPM | OXYGEN SATURATION: 98 % | SYSTOLIC BLOOD PRESSURE: 150 MMHG | BODY MASS INDEX: 32.58 KG/M2 | DIASTOLIC BLOOD PRESSURE: 82 MMHG | WEIGHT: 215 LBS | HEART RATE: 85 BPM

## 2023-11-29 DIAGNOSIS — C20 RECTAL CANCER (H): Primary | ICD-10-CM

## 2023-11-29 PROCEDURE — 99213 OFFICE O/P EST LOW 20 MIN: CPT | Performed by: SURGERY

## 2023-11-29 PROCEDURE — G0463 HOSPITAL OUTPT CLINIC VISIT: HCPCS

## 2023-11-29 ASSESSMENT — PAIN SCALES - GENERAL: PAINLEVEL: NO PAIN (0)

## 2023-11-29 NOTE — PROGRESS NOTES
"CLINIC NOTE - POST-OP ENDOSCOPY  11/29/2023    Patient:Nicolas Malone    Procedure: Esophagogastroduodenoscopy with biopsy and Colonoscopy with biopsies    This is a 73 year old male who is 2 weeks s/p Esophagogastroduodenoscopy with biopsy and Colonoscopy with biopsies.  At the time of endoscopy a mass was noted in the rectum at about 5 cm.  Pathology returned as adenocarcinoma.    He did have a CT of the chest abdomen pelvis.  He also had an MRI of his pelvis for staging.  He is scheduled to see the colorectal surgery in [place] tomorrow.     Current Medications:  Current Outpatient Medications   Medication Sig Dispense Refill    ibuprofen (ADVIL/MOTRIN) 200 MG tablet Take 200 mg by mouth daily      sildenafil (VIAGRA) 100 MG tablet TAKE 1 TABLET BY MOUTH 1 TO 4 HOURS BEFORE SEXUAL ACTIVITY      triamterene-HCTZ (DYAZIDE) 37.5-25 MG capsule Take 1 capsule by mouth every morning         Allergies:  Allergies   Allergen Reactions    Penicillins      Childhood       PHYSICAL EXAM:   Vital signs: BP (!) 150/82 (BP Location: Left arm, Cuff Size: Adult Large)   Pulse 85   Resp 16   Ht 1.727 m (5' 8\")   Wt 97.5 kg (215 lb)   SpO2 98%   BMI 32.69 kg/m     Weight: [unfilled]   BMI: Body mass index is 32.69 kg/m .   General: Normal, healthy, cooperative, in no acute distress   Abdominal: abdomen is soft without significant tenderness, masses, organomegaly or guarding       PATHOLOGY:  Case Report   Date Value Ref Range Status   11/20/2023   Final    Surgical Pathology Report                         Case: [id number]                                  Authorizing Provider:  Greg Waddell MD  Collected:           11/20/2023 12:45 PM          Ordering Location:     [facility] Main Operating Room     Received:            11/20/2023 01:45 PM          Pathologist:           Marcelino Ronquillo DO                                                         Specimens:   A) - Small Intestine, Duodenum                                      "                                 B) - Stomach, Antrum                                                                                C) - Esophagus, Distal                                                                              D) - Rectum                                                                                 Final Diagnosis   Date Value Ref Range Status   11/20/2023   Final    A.  Duodenum, biopsy:  -Duodenal mucosa with no diagnostic abnormality.    B.  Stomach, antrum, biopsy:  -Antral mucosa with no diagnostic abnormality.    C.  Esophagus, distal, biopsy:  -Squamous mucosa with no diagnostic abnormality.    D.  Rectum, lesion, biopsy:  -Invasive moderately-differentiated adenocarcinoma with focal background features of a tubulovillous adenoma.  -Tumor present at inked deep margin.  -Small separate mucosal fragment with features of a hyperplastic polyp.  -See comment.         ASSESSMENT:    73 year old male who is 2 weeks s/p Esophagogastroduodenoscopy with biopsy and Colonoscopy with biopsies.  Colonoscopy showed a rectal mass of 5 cm with pathology showing adenocarcinoma.  CT and MR shows no evidence of distant disease.  There were small Aristeo rectal nodes noted.    PLAN:   Did discuss his pathology with him.  Answered all of his questions.  He is scheduled to see colorectal surgery at the [facility] tomorrow.  Will defer treatment options and plan to them.

## 2023-11-29 NOTE — PATIENT INSTRUCTIONS
Thank you for allowing Dr. Waddell and our surgical team to participate in your care. Please call our health unit coordinator at [phone] with scheduling questions or the nurse at [phone] with any other questions or concerns.

## 2023-11-30 ENCOUNTER — PRE VISIT (OUTPATIENT)
Dept: SURGERY | Facility: CLINIC | Age: 73
End: 2023-11-30

## 2023-11-30 ENCOUNTER — OFFICE VISIT (OUTPATIENT)
Dept: SURGERY | Facility: CLINIC | Age: 73
End: 2023-11-30
Payer: MEDICARE

## 2023-11-30 VITALS
WEIGHT: 212.4 LBS | DIASTOLIC BLOOD PRESSURE: 80 MMHG | BODY MASS INDEX: 32.19 KG/M2 | HEIGHT: 68 IN | SYSTOLIC BLOOD PRESSURE: 161 MMHG | HEART RATE: 58 BPM | OXYGEN SATURATION: 97 %

## 2023-11-30 DIAGNOSIS — C20 RECTAL CANCER (H): ICD-10-CM

## 2023-11-30 PROCEDURE — 45330 DIAGNOSTIC SIGMOIDOSCOPY: CPT | Performed by: SURGERY

## 2023-11-30 PROCEDURE — 99204 OFFICE O/P NEW MOD 45 MIN: CPT | Mod: 25 | Performed by: SURGERY

## 2023-11-30 ASSESSMENT — PAIN SCALES - GENERAL: PAINLEVEL: NO PAIN (0)

## 2023-11-30 NOTE — LETTER
2023       RE: Nicolas Malone  [address] [facility] [address]       Dear Colleague,    Thank you for referring your patient, Nicolas Malone, to the [facility] COLON AND RECTAL SURGERY CLINIC [place] at [facility]. Please see a copy of my visit note below.    Colon and Rectal Surgery Clinic Note    RE: Nicolas Malone.  : 1950.  ROXIE: 2023.    Reason for visit: rectal cancer.      HPI: Nicolas Malone is a 73 year old male who presents today for rectal cancer. He has no significant past medical history. On 2023 he underwent a colonoscopy for a positive cologaurd test. Colonoscopy demonstrated a rectal mass. Biopsy was positive for moderately differentiated adenocarcinoma, MMR intact. CEA 5.8. CT Chest/Abdomen/Pelvis on 2023 showed two adjacent deep pelvic lymph nodes measuring 6 and 7mm suspicious for ayla disease. MRI rectum on 2023 showed a 2cm rectal mass without evidence of invasion into the perirectal fat or adjacent structures.                                                                     Colonoscopy (2023):  Rectal exam was performed and a mass was palpated at the distal aspect of my finger length or proximally 5 cm.  The colonoscope was inserted into the anus and passed without difficulty to the cecum.  The cecum was identified by the ileocecal valve, the coalescence of the tinea and the appendiceal orifice.  Upon withdrawal all walls of the colon were visualized.  A mass was identified in the rectum.  A large piece was taken using the snare.  Colitis were not seen.colon  Diverticulosis was not seen.  Upon reaching the rectum the scope was retroflexed and internal hemorrhoids  were  seen.  The scope was straightened back out and removed from the patient.  The patient was then taken to the recovery room in stable condition tolerating the procedure well.       Surgical Pathology (2023):  A.  Duodenum,  biopsy:  -Duodenal mucosa with no diagnostic abnormality.  B.  Stomach, antrum, biopsy:  -Antral mucosa with no diagnostic abnormality.  C.  Esophagus, distal, biopsy:  -Squamous mucosa with no diagnostic abnormality.  D.  Rectum, lesion, biopsy:  -Invasive moderately-differentiated adenocarcinoma with focal background features of a tubulovillous adenoma.  -Tumor present at inked deep margin.  -Small separate mucosal fragment with features of a hyperplastic polyp.  -See comment..  Mismatch Repair     Immunohistochemistry (IHC) Testing for Mismatch Repair (MMR) Proteins     MLH1 Result  Intact nuclear expression   Immunohistochemistry (IHC) Testing for Mismatch Repair (MMR) Proteins     MSH2 Result  Intact nuclear expression   Immunohistochemistry (IHC) Testing for Mismatch Repair (MMR) Proteins     MSH6 Result  Intact nuclear expression   Immunohistochemistry (IHC) Testing for Mismatch Repair (MMR) Proteins     PMS2 Result  Intact nuclear expression   Immunohistochemistry (IHC) Testing for Mismatch Repair (MMR) Proteins  Background nonneoplastic tissue / internal control with intact nuclear expression   IHC Interpretation  No loss of nuclear expression of MMR proteins: low probability of MSI-H       CEA (11/22/2023):   CEA  ng/mL 5.8     CT Chest/Abdomen/Pelvis (11/28/2023):  IMPRESSION:     Left posterior rectal mucosal mass measuring up to 26 mm.    2 adjacent deep pelvic lymph nodes measure 7 and 6 mm, with rounded  contour, nonspecific, but suspicious for local ayla involvement.   Multiple small pulmonary nodules are highly nonspecific.    MRI Rectum (11/28/2023):                                IMPRESSION:  2 cm rectal mass without evidence of invasion into the perirectal fat or adjacent structures.  Two subcentimeter deep left pelvic lymph nodes are nonspecific.    Medical history:  -none    Surgical history:  -none    Family history:  No Hx of IBD or GI malignancy    Medications:  Current Outpatient  "Medications   Medication Sig Dispense Refill    ibuprofen (ADVIL/MOTRIN) 200 MG tablet Take 200 mg by mouth daily      sildenafil (VIAGRA) 100 MG tablet TAKE 1 TABLET BY MOUTH 1 TO 4 HOURS BEFORE SEXUAL ACTIVITY      triamterene-HCTZ (DYAZIDE) 37.5-25 MG capsule Take 1 capsule by mouth every morning         Allergies:  Allergies   Allergen Reactions    Penicillins      Childhood       Social history:   Social History     Tobacco Use    Smoking status: Never    Smokeless tobacco: Never   Substance Use Topics    Alcohol use: Yes     Comment: rarely     Marital status: unknown.    ROS:  A complete review of systems was performed with the patient and all systems negative except as per HPI.    Physical Examination:  Exam was chaperoned by Joanna Meredith CMA  BP (!) 161/80 (BP Location: Left arm, Patient Position: Sitting, Cuff Size: Adult Large)   Pulse 58   Ht 1.727 m (5' 8\")   Wt 96.3 kg (212 lb 6.4 oz)   SpO2 97%   BMI 32.30 kg/m    General: Well hydrated. No acute distress.  Abdomen: Soft, NT, ND, well healed midline incision from prior trauma.. No inguinal adenopathy palpated.  Perianal external examination:  Perianal skin: intact.  Lesions: No.  Eversion of buttocks: There was not evidence of an anal fissure. Details: N/A.  Skin tags or external hemorrhoids: None.  Digital rectal examination: Was performed.   Sphincter tone: Good.  Palpable lesions: No.  Other: None.  Bimanual examination: was not performed.    Anoscopy: Was performed.   Hemorrhoids: No significant internal hemorrhoids.  Lesions: No    Procedures:  Prior to the start of the procedure and with procedural staff participation, I verbally confirmed the patient s identity using two indicators, relevant allergies, that the procedure was appropriate and matched the consent or emergent situation, and that the correct equipment/implants were available. Immediately prior to starting the procedure I conducted the Time Out with the procedural staff and " re-confirmed the patient s name, procedure, and site/side. (The Joint Commission universal protocol was followed.)  Yes    Sedation (Moderate or Deep): None    Flexible sigmoidoscopy was performed to 15 cm. 2 cm was identified (left posterior laterality), oozing, ulcerated. This was non-obstructing . Pictures taken, uploaded into epic.    ASSESSMENT  1. Rectal cancer  2. Hypertension    PLAN  1. Will discuss at tumor board to identify T and N stage. Will obtain overreads. Surgery vs VONDA will depend on tumor stage, but will plan for med onc and rad onc referrals.     Risks, benefits, and alternatives of operative treatment were thoroughly discussed with the patient, he understands these well and agrees to proceed.    Time spent: 45 minutes, an additional 15 minutes were spent doing flex sig procedure.  >50% spent in discussing, counseling and coordinating care.    Referring Provider:  Greg Waddell MD  [address] [place] [id number]     Primary Care Provider:  No Ref-Primary, Physician      Again, thank you for allowing me to participate in the care of your patient.      Sincerely,    Tommy Rodriguez MD, MD

## 2023-11-30 NOTE — NURSING NOTE
"Chief Complaint   Patient presents with    Cancer     Rectal Cancer, flexible sigmoidoscopy       Vitals:    11/30/23 0840   BP: (!) 161/80   BP Location: Left arm   Patient Position: Sitting   Cuff Size: Adult Large   Pulse: 58   SpO2: 97%   Weight: 212 lb 6.4 oz   Height: 5' 8\"       Body mass index is 32.3 kg/m .    Joanna Meredith CMA    "

## 2023-11-30 NOTE — CONFIDENTIAL NOTE
Problem: Nausea/Vomiting  Goal: Maintains oral intake with decreased/no reports of nausea/vomiting  Outcome: Outcome Met, Continue evaluating goal progress toward completion  Goal: Current weight maintained or increased  Outcome: Outcome Met, Continue evaluating goal progress toward completion  Goal: Verbalizes understanding of s/s and strategies to control nausea/vomiting  Description: Document education using the patient education activity.   Outcome: Outcome Met, Continue evaluating goal progress toward completion      Nicolas Malone had flexible endoscope (serial number [id number], model Olympus 0160S) used for a procedure on 11/30/2023 at 9:30 AM.      Joanna Meredith CMA        well-nourished/well-developed/no distress/well-groomed

## 2023-11-30 NOTE — PATIENT INSTRUCTIONS
Medical and Radiation Oncology Referral. They will call you to schedule.     Flores BARROSO [phone]

## 2023-12-04 ENCOUNTER — TUMOR CONFERENCE (OUTPATIENT)
Dept: ONCOLOGY | Facility: CLINIC | Age: 73
End: 2023-12-04
Payer: MEDICARE

## 2023-12-04 DIAGNOSIS — C20 RECTAL CANCER (H): Primary | ICD-10-CM

## 2023-12-04 NOTE — TUMOR CONFERENCE
Tumor Conference Information  Tumor Conference: Colorectal  Specialties Present: Medical oncology, Radiation oncology, Pathology, Radiology, Surgery  Patient Status: Retrospective  Stage: low rectal cancer  Treatment to Date: None  Clinical Trials: Not discussed  Genetic Testing Discussed/Recommended?: No  Supportive Care Services Discussed/Recommended?: No  Recommended Plan: Follows evidence-based guidelines (Comment: chemorads)  Did the review exceed 30 minutes?: did not           Documentation / Disclaimer Cancer Tumor Board Note  Cancer tumor board recommendations do not override what is determined to be reasonable care and treatment, which is dependent on the circumstances of a patient's case; the patient's medical, social, and personal concerns; and the clinical judgment of the oncologist [physician].

## 2023-12-05 ENCOUNTER — ONCOLOGY VISIT (OUTPATIENT)
Dept: ONCOLOGY | Facility: OTHER | Age: 73
End: 2023-12-05
Attending: INTERNAL MEDICINE
Payer: MEDICARE

## 2023-12-05 VITALS
DIASTOLIC BLOOD PRESSURE: 80 MMHG | SYSTOLIC BLOOD PRESSURE: 162 MMHG | OXYGEN SATURATION: 94 % | HEART RATE: 62 BPM | BODY MASS INDEX: 34.15 KG/M2 | HEIGHT: 67 IN | TEMPERATURE: 97.2 F | WEIGHT: 217.59 LBS

## 2023-12-05 DIAGNOSIS — C20 RECTAL CANCER (H): Primary | ICD-10-CM

## 2023-12-05 PROCEDURE — G0463 HOSPITAL OUTPT CLINIC VISIT: HCPCS

## 2023-12-05 PROCEDURE — 99205 OFFICE O/P NEW HI 60 MIN: CPT | Performed by: INTERNAL MEDICINE

## 2023-12-05 ASSESSMENT — PAIN SCALES - GENERAL: PAINLEVEL: NO PAIN (0)

## 2023-12-05 ASSESSMENT — PATIENT HEALTH QUESTIONNAIRE - PHQ9: SUM OF ALL RESPONSES TO PHQ QUESTIONS 1-9: 0

## 2023-12-05 NOTE — PROGRESS NOTES
MEDICAL ONCOLOGY CONSULT NOTE  Dec 5, 2023    Reason for consult: Rectal cancer.     Referring provider: Dr. Rodriguez    HISTORY OF PRESENT ILLNESS  Nicolas Malone is a 73 year old male with PMH as stated below who is seen in the oncology clinic for rectal cancer.     Her history in short is as follows:    11/20/2023: EGD and Colonoscopy: Rectal mass palpable at 5 cm. Biopsy showed -Invasive moderately-differentiated adenocarcinoma with focal background features of a tubulovillous adenoma.  -Tumor present at inked deep margin.  -Small separate mucosal fragment with features of a hyperplastic polyp.  No loss of nuclear expression of MMR proteins: low probability of MSI-H     11/28/2023: CT chest, abdomen and pelvis with contrast: Left posterior rectal mucosal mass measuring up to 26 mm.   2 adjacent deep pelvic lymph nodes measure 7 and 6 mm, with rounded contour, nonspecific, but suspicious for local ayla involvement.   Multiple small pulmonary nodules are highly nonspecific.    11/28/2023: MRI pelvis with and without contrast: IMPRESSION:  2 cm rectal mass without evidence of invasion into the  perirectal fat or adjacent structures.  Two subcentimeter deep left pelvic lymph nodes are nonspecific.   These findings correspond to stage T2 N1 lower rectal tumor.     11/30/2023:  Met with Dr. Rodriguez- flex sigmoidoscopy done in clinic showed ulcerated mass, non obstructing.     He is doing well. Denies any blood in stools, denies any abdominal pain. Denies any change in bowel habits. Denies any weight or appetite loss.     FH: Denies any family history of colon cancer.     REVIEW OF SYSTEMS  A 12-point ROS negative except as in HPI      Current Outpatient Medications   Medication Sig Dispense Refill    ibuprofen (ADVIL/MOTRIN) 200 MG tablet Take 200 mg by mouth daily      triamterene-HCTZ (DYAZIDE) 37.5-25 MG capsule Take 1 capsule by mouth every morning      sildenafil (VIAGRA) 100 MG tablet TAKE 1 TABLET BY MOUTH 1 TO  "4 HOURS BEFORE SEXUAL ACTIVITY (Patient not taking: Reported on 12/5/2023)         Allergies   Allergen Reactions    Penicillins      Childhood     Immunization History   Administered Date(s) Administered    COVID-19 Vaccine ([employer]) 06/02/2021, 11/08/2021       Past Medical History:   Diagnosis Date    Cancer (H)     History of blood transfusion     Hypertension        Past Surgical History:   Procedure Laterality Date    ENDOSCOPY UPPER, COLONOSCOPY, COMBINED N/A 11/20/2023    Procedure: COLONOSCOPY with polypectomy,  EGD WITH BIOPSY;  Surgeon: Greg Waddell MD;  Location: [facility]       SOCIAL HISTORY  History   Smoking Status    Never   Smokeless Tobacco    Never    Social History    Substance and Sexual Activity      Alcohol use: Yes        Comment: rarely     History   Drug Use Unknown       FAMILY HISTORY  Family History   Problem Relation Age of Onset    Cerebrovascular Disease Mother     Coronary Artery Disease Father     Cerebrovascular Disease Brother     Coronary Artery Disease Sister        PHYSICAL EXAMINATION  BP (!) 162/80   Pulse 62   Temp 97.2  F (36.2  C) (Tympanic)   Ht 1.702 m (5' 7\")   Wt 98.7 kg (217 lb 9.5 oz)   SpO2 94%   BMI 34.08 kg/m    Wt Readings from Last 2 Encounters:   12/05/23 98.7 kg (217 lb 9.5 oz)   11/30/23 96.3 kg (212 lb 6.4 oz)     Physical Exam  Constitutional:       Appearance: Normal appearance.   Cardiovascular:      Pulses: Normal pulses.   Pulmonary:      Effort: Pulmonary effort is normal.   Neurological:      General: No focal deficit present.      Mental Status: He is alert and oriented to person, place, and time.   Psychiatric:         Mood and Affect: Mood normal.         Behavior: Behavior normal.     Laboratory and Imaging:     Latest Reference Range & Units 11/22/23 10:25   WBC 4.0 - 11.0 10e3/uL 7.1   Hemoglobin 13.3 - 17.7 g/dL 14.7   Hematocrit 40.0 - 53.0 % 42.3   Platelet Count 150 - 450 10e3/uL 196       ASSESSMENT AND PLAN    Rectal " Cancer    Found to have low rectal cancer on colonoscopy. MRI rectal staging T2N1    Discussed at tumor board and recommendation of total neoadjuvant chemotherapy with goal of organ preservation.     Discussed Total neoadjuvant therapy will entail concurrent chemo RT followed by chemotherapy.     Discussed recommendation of concurrent chemo RT with capecitabine 825 mg/m2 BID to be taken M-F on day of radiation. Then move on to 5FU and oxaliplatin based systemic therapy in the form of either CAPEOX or FOLFOX for 4 months. Logistics of both were discussed.    Expected side effects which include but not limited to nasuea, vomiting, diarrhea, low blood counts, infection, kidney or liver toxicity, allergic reactions, neuropathy, hand foot syndrome and cardiac spasm.     He is agreeable to proceed. He would like to think before deciding on CapeOX or FOLFOX. All discussed recommendation for port. Will refer once he completes chemoRT. He is scheduled to see radiation tomorrow.     Follow up in clinic prior to starting chemoRT.     Total time spent on the patient on day of encounter was 77  minutes doing chart review, review of test results, interpretation of results, patient visit and documentation.       Angélica Estrada MD

## 2023-12-05 NOTE — NURSING NOTE
"Oncology Rooming Note    December 5, 2023 1:11 PM   Nicolas Malone is a 73 year old male who presents for:    Chief Complaint   Patient presents with    Oncology Clinic Visit     New consult prostate cancer     Initial Vitals: BP (!) 162/80   Pulse 62   Temp 97.2  F (36.2  C) (Tympanic)   Ht 1.702 m (5' 7\")   Wt 98.7 kg (217 lb 9.5 oz)   SpO2 94%   BMI 34.08 kg/m   Estimated body mass index is 34.08 kg/m  as calculated from the following:    Height as of this encounter: 1.702 m (5' 7\").    Weight as of this encounter: 98.7 kg (217 lb 9.5 oz). Body surface area is 2.16 meters squared.  No Pain (0) Comment: Data Unavailable   No LMP for male patient.  Allergies reviewed: Yes  Medications reviewed: Yes    Medications: Medication refills not needed today.  Pharmacy name entered into [employer]: [id number] - [place] - [address] AT [facility] [address]    Clinical concerns: high BP  Dr. Estrada  was notified.      Jessica Kimbrough LPN                    "

## 2023-12-06 ENCOUNTER — ONCOLOGY VISIT (OUTPATIENT)
Dept: RADIATION ONCOLOGY | Facility: HOSPITAL | Age: 73
End: 2023-12-06
Attending: STUDENT IN AN ORGANIZED HEALTH CARE EDUCATION/TRAINING PROGRAM
Payer: MEDICARE

## 2023-12-06 ENCOUNTER — DOCUMENTATION ONLY (OUTPATIENT)
Dept: ONCOLOGY | Facility: CLINIC | Age: 73
End: 2023-12-06
Payer: MEDICARE

## 2023-12-06 ENCOUNTER — TELEPHONE (OUTPATIENT)
Dept: ONCOLOGY | Facility: OTHER | Age: 73
End: 2023-12-06

## 2023-12-06 VITALS
HEART RATE: 52 BPM | OXYGEN SATURATION: 96 % | TEMPERATURE: 97.4 F | BODY MASS INDEX: 34.14 KG/M2 | WEIGHT: 218 LBS | SYSTOLIC BLOOD PRESSURE: 122 MMHG | DIASTOLIC BLOOD PRESSURE: 78 MMHG | RESPIRATION RATE: 16 BRPM

## 2023-12-06 DIAGNOSIS — C20 RECTAL CANCER (H): ICD-10-CM

## 2023-12-06 PROCEDURE — G0463 HOSPITAL OUTPT CLINIC VISIT: HCPCS

## 2023-12-06 PROCEDURE — 99205 OFFICE O/P NEW HI 60 MIN: CPT | Performed by: STUDENT IN AN ORGANIZED HEALTH CARE EDUCATION/TRAINING PROGRAM

## 2023-12-06 ASSESSMENT — ENCOUNTER SYMPTOMS
SHORTNESS OF BREATH: 0
NAUSEA: 0
BLOOD IN STOOL: 0
FEVER: 0
DIARRHEA: 0
WEIGHT LOSS: 0
CONSTIPATION: 0
COUGH: 0
ABDOMINAL PAIN: 0
INSOMNIA: 0
VOMITING: 0

## 2023-12-06 ASSESSMENT — PAIN SCALES - GENERAL: PAINLEVEL: NO PAIN (1)

## 2023-12-06 NOTE — PROGRESS NOTES
Name: Nicolas Malone MRN: [id number]   : 1950 (73 year old)  Date of Service: 2023   Referring: Angélica Estrada       Diagnosis: rectal cancer    Prior radiation therapy: None    Prior chemotherapy: None    T/c to obtain health history.  Spoke with patient.  Discussed cancer rehab and he declined.  He states he is physically active.  He does not have any financial concerns.  He is able to drive and has no transportation concerns.  He has no questions or concerns.  Appointment confirmed for this afternoon.      Alee Brennan RN

## 2023-12-06 NOTE — PROGRESS NOTES
"Radiation Oncology Rooming Note    December 6, 2023 2:09 PM     Nicolas Malone is a 73 year old male who presents for:       Chief Complaint   Patient presents with    Consult     Radiation therapy consult       Initial Vitals: /78 (BP Location: Right arm, Patient Position: Chair, Cuff Size: Adult Regular)   Pulse 52   Temp 97.4  F (36.3  C) (Tympanic)   Resp 16   Wt 98.9 kg (218 lb)   SpO2 96%   BMI 34.14 kg/m     Estimated body mass index is 34.14 kg/m  as calculated from the following:    Height as of 12/5/23: 1.702 m (5' 7\").    Weight as of this encounter: 98.9 kg (218 lb).   Body surface area is 2.16 meters squared.  No Pain (1) Comment: Data Unavailable     Patient completed the following questionnaires: NCCN Distress Thermometer.       Patient was assessed using the NCCN psychosocial distress thermometer. Patient rated the score as a zero. Patient rated current stressors as no stressors. Stressors brought to the attention of Flores Adams CNP for a score of 6 or greater or per nurses discretion.       Patient received folder containing the following:  [employer] Radiation Therapy and You booklet  radiation planning process packet  radiation therapy timeline  financial letter  vaccines during cancer treatment hand out  mental health services and providers packet  cancer resource list  family support group handouts   business card  radiation therapy business card    Patient already has advance directives.     Financial assistance resources given to patient:  Patient has already received [employer], and care [employer] packets.    Patient given site specific instructions including:   bladder/bowel instructions.      Farzana Lantigua LPN  "
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
## 2023-12-06 NOTE — TELEPHONE ENCOUNTER
Prior Authorization Not Needed per Insurance    Medication: CAPECITABINE 500 MG PO TABS  Insurance Company: [employer] [place] - Phone [phone] Fax [phone]  Expected CoPay: $    Pharmacy Filling the Rx: Pending sale to [facility] - [place], [place] - [address]   Pharmacy Notified: yes  Patient Notified: yes

## 2023-12-07 ENCOUNTER — TELEPHONE (OUTPATIENT)
Dept: ONCOLOGY | Facility: OTHER | Age: 73
End: 2023-12-07

## 2023-12-07 DIAGNOSIS — C20 RECTAL CANCER (H): Primary | ICD-10-CM

## 2023-12-07 RX ORDER — PROCHLORPERAZINE MALEATE 10 MG
10 TABLET ORAL EVERY 6 HOURS PRN
Qty: 30 TABLET | Refills: 2 | Status: SHIPPED | OUTPATIENT
Start: 2023-12-07 | End: 2024-02-14

## 2023-12-07 RX ORDER — CAPECITABINE 500 MG/1
1500 TABLET, FILM COATED ORAL 2 TIMES DAILY
Qty: 168 TABLET | Refills: 0 | Status: SHIPPED | OUTPATIENT
Start: 2023-12-11 | End: 2024-01-10

## 2023-12-07 NOTE — TELEPHONE ENCOUNTER
"Oral Chemotherapy Monitoring Program    Primary Oncologist: Dr. Estrada  Primary Oncology Clinic: [facility]  Cancer Diagnosis: Rectal Cancer    Drug: Xeloda (capecitabine)  Start Date: TBD with start of radiation  Expected duration of therapy: Course of Radiation Treatment    Drug Interaction Assessment: None    Lab Monitoring Plan  Per treatment protocol    Subjective/Objective:  Nicolas Malone is a 73 year old male contacted by phone for an initial visit for oral chemotherapy education.          12/7/2023    10:00 AM   ORAL CHEMOTHERAPY   Assessment Type New Teach   Diagnosis Code Rectal Cancer   Providers Sean   Clinic Name/Location [facility]   Drug Name Xeloda (capecitabine)   Dose 1,500 mg   Current Schedule BID   Cycle Details M-F only during XRT       Vitals:  BP:   BP Readings from Last 1 Encounters:   12/06/23 122/78     Wt Readings from Last 1 Encounters:   12/06/23 98.9 kg (218 lb)     Estimated body surface area is 2.16 meters squared as calculated from the following:    Height as of 12/5/23: 1.702 m (5' 7\").    Weight as of 12/6/23: 98.9 kg (218 lb).    Labs:  No results found for NA within last 30 days.     No results found for K within last 30 days.     No results found for CA within last 30 days.     No results found for Mag within last 30 days.     No results found for Phos within last 30 days.     No results found for ALBUMIN within last 30 days.     No results found for BUN within last 30 days.     No results found for CR within last 30 days.       No results found for AST within last 30 days.     No results found for ALT within last 30 days.     No results found for BILITOTAL within last 30 days.       _  Result Component Current Result Ref Range   WBC Count 7.1 (11/22/2023) 4.0 - 11.0 10e3/uL     _  Result Component Current Result Ref Range   Hemoglobin 14.7 (11/22/2023) 13.3 - 17.7 g/dL     _  Result Component Current Result Ref Range   Platelet Count 196 (11/22/2023) 150 - 450 10e3/uL     No results " found for ANC within last 30 days.         Assessment:  Patient is appropriate to start therapy.    Plan:  Basic chemotherapy teaching was reviewed with the patient including indication, start date of therapy, dose, administration, adverse effects, missed doses, food and drug interactions, monitoring, side effect management, office contact information, and safe handling. Written materials were provided and all questions answered.    Follow-Up:  12/13/2023 Check to see if scheduled radiation appt and schedule 1 week f/u call     Anay PhilippeD, [facility]  Oncology Pharmacy Manager  [facility]  [phone]

## 2023-12-11 ENCOUNTER — ALLIED HEALTH/NURSE VISIT (OUTPATIENT)
Dept: RADIATION ONCOLOGY | Facility: HOSPITAL | Age: 73
End: 2023-12-11
Payer: MEDICARE

## 2023-12-11 DIAGNOSIS — C20 RECTAL CANCER (H): Primary | ICD-10-CM

## 2023-12-11 PROCEDURE — 77263 THER RADIOLOGY TX PLNG CPLX: CPT | Performed by: STUDENT IN AN ORGANIZED HEALTH CARE EDUCATION/TRAINING PROGRAM

## 2023-12-11 PROCEDURE — 77470 SPECIAL RADIATION TREATMENT: CPT | Performed by: STUDENT IN AN ORGANIZED HEALTH CARE EDUCATION/TRAINING PROGRAM

## 2023-12-11 PROCEDURE — 77290 THER RAD SIMULAJ FIELD CPLX: CPT | Performed by: STUDENT IN AN ORGANIZED HEALTH CARE EDUCATION/TRAINING PROGRAM

## 2023-12-11 PROCEDURE — 77334 RADIATION TREATMENT AID(S): CPT | Mod: 26 | Performed by: STUDENT IN AN ORGANIZED HEALTH CARE EDUCATION/TRAINING PROGRAM

## 2023-12-11 PROCEDURE — 77334 RADIATION TREATMENT AID(S): CPT | Performed by: STUDENT IN AN ORGANIZED HEALTH CARE EDUCATION/TRAINING PROGRAM

## 2023-12-11 PROCEDURE — 77290 THER RAD SIMULAJ FIELD CPLX: CPT | Mod: 26 | Performed by: STUDENT IN AN ORGANIZED HEALTH CARE EDUCATION/TRAINING PROGRAM

## 2023-12-11 PROCEDURE — 77470 SPECIAL RADIATION TREATMENT: CPT | Mod: 26 | Performed by: STUDENT IN AN ORGANIZED HEALTH CARE EDUCATION/TRAINING PROGRAM

## 2023-12-11 NOTE — PROGRESS NOTES
Patient simulation completed for Nicolas martinez 12/11/23.    Shelton Estrada  December 11, 2023  2:45 PM

## 2023-12-11 NOTE — PROGRESS NOTES
[facility]  DEPARTMENT OF RADIATION ONCOLOGY   SIMULATION NOTE    Name: Nicolas Malone MRN: [id number]   : 1950 (73 year old)  Date of Service: Dec 11, 2023        Diagnosis and Cancer Staging   No matching staging information was found for the patient.       Procedure   For non-SBRT treatment, the patient comes to clinic for simulation and radiation therapy for treatment as specified on the written consent (site of treatment, type of cancer). Therapy, Treatment Planning, and I reviewed the anticipated radiation therapy, clinical history and documentation, and radiographic information and images. We verified written consent for treatment. With the patient, we verified their identification, site, and side of treatment. We evaluated multiple setup positions and elected to simulate the patient in a modified supine position. We used orthogonal lasers to align them with the CT simulator. We immobilized the patient with a customized torso mold and accessories to improve the reproducibility and safety for daily radiation therapy. We placed radiopaque markers to assist in identifying topographical landmarks for simulation. We obtained  and axial CT imaging through the target region. We used virtual simulation techniques to verify the adequacy of the CT images and to create a preliminary setup isocenter. Motion management was not utilized. We placed tattoos to marvin the setup isocenter. The patient tolerated the procedure well and without complications. We will use available diagnostic and radiation therapy imaging studies for CT-based treatment planning with image fusion as indicated. I anticipate utilizing a form of intensity-modulated or 3D-conformal radiation therapy to develop a computerized treatment plan whose dosimetric analysis (e.g., dose-volume histogram (DVH)) indicates adequate coverage of target tissues and sparing of nearby normal structures. We will complete routine QA  procedures. The patient wished to proceed as recommended. We answered all questions to his satisfaction.    Implanted Cardiac Devices: None.      ANDREINA Espinoza CNP   Department of Radiation Oncology  Tel: [phone]  Fax: [phone]      Benson Huerta MD  Radiation Oncologist    I saw this patient while providing loc[employer]ens coverage.

## 2023-12-12 ENCOUNTER — PATIENT OUTREACH (OUTPATIENT)
Dept: ONCOLOGY | Facility: OTHER | Age: 73
End: 2023-12-12

## 2023-12-12 NOTE — PROGRESS NOTES
[facility]: Cancer Care                                                                                        TC to patient to confirm if he had received his capecitabine. Will set up a follow up prior to starting concurrent chemo radiation.       Signature:  Analisa Holguin RN

## 2023-12-19 ENCOUNTER — TELEPHONE (OUTPATIENT)
Dept: PET IMAGING | Facility: HOSPITAL | Age: 73
End: 2023-12-19

## 2023-12-19 NOTE — TELEPHONE ENCOUNTER
Appointment Reminder Call    -Exam prep  -Where and when to register  -Appointment length    Did get disconnected at end of call but I stated everything and he did a good read back.

## 2023-12-20 ENCOUNTER — HOSPITAL ENCOUNTER (OUTPATIENT)
Dept: PET IMAGING | Facility: HOSPITAL | Age: 73
Discharge: HOME | End: 2023-12-20
Payer: MEDICARE

## 2023-12-20 ENCOUNTER — DOCUMENTATION ONLY (OUTPATIENT)
Dept: RADIATION ONCOLOGY | Facility: HOSPITAL | Age: 73
End: 2023-12-20
Payer: MEDICARE

## 2023-12-20 DIAGNOSIS — C20 RECTAL CANCER (H): ICD-10-CM

## 2023-12-20 PROCEDURE — 343N000001 HC RX 343

## 2023-12-20 PROCEDURE — G1010 CDSM STANSON: HCPCS | Mod: PI

## 2023-12-20 PROCEDURE — A9552 F18 FDG: HCPCS

## 2023-12-20 RX ADMIN — FLUDEOXYGLUCOSE F-18 12.6 MILLICURIE: 500 INJECTION, SOLUTION INTRAVENOUS at 07:10

## 2023-12-20 NOTE — PROGRESS NOTES
Called patient with PET scan results. Will continue with plan of care. PET will be fused for treatment planning. Patient reports understanding and denies further questions or concerns at this time.

## 2023-12-22 LAB
CREAT BLD-MCNC: 1.4 MG/DL (ref 0.7–1.3)
EGFRCR SERPLBLD CKD-EPI 2021: 53 ML/MIN/1.73M2

## 2023-12-27 PROCEDURE — 77300 RADIATION THERAPY DOSE PLAN: CPT | Performed by: STUDENT IN AN ORGANIZED HEALTH CARE EDUCATION/TRAINING PROGRAM

## 2023-12-27 PROCEDURE — 77300 RADIATION THERAPY DOSE PLAN: CPT | Mod: 26 | Performed by: STUDENT IN AN ORGANIZED HEALTH CARE EDUCATION/TRAINING PROGRAM

## 2023-12-27 PROCEDURE — 77338 DESIGN MLC DEVICE FOR IMRT: CPT | Mod: 26 | Performed by: STUDENT IN AN ORGANIZED HEALTH CARE EDUCATION/TRAINING PROGRAM

## 2023-12-27 PROCEDURE — 77338 DESIGN MLC DEVICE FOR IMRT: CPT | Performed by: STUDENT IN AN ORGANIZED HEALTH CARE EDUCATION/TRAINING PROGRAM

## 2023-12-27 PROCEDURE — 77301 RADIOTHERAPY DOSE PLAN IMRT: CPT | Mod: 26 | Performed by: STUDENT IN AN ORGANIZED HEALTH CARE EDUCATION/TRAINING PROGRAM

## 2023-12-27 PROCEDURE — 77301 RADIOTHERAPY DOSE PLAN IMRT: CPT | Performed by: STUDENT IN AN ORGANIZED HEALTH CARE EDUCATION/TRAINING PROGRAM

## 2023-12-28 ENCOUNTER — RESULTS ONLY (OUTPATIENT)
Dept: RADIATION ONCOLOGY | Facility: HOSPITAL | Age: 73
End: 2023-12-28

## 2023-12-28 ENCOUNTER — LAB (OUTPATIENT)
Dept: LAB | Facility: OTHER | Age: 73
End: 2023-12-28
Payer: MEDICARE

## 2023-12-28 ENCOUNTER — APPOINTMENT (OUTPATIENT)
Dept: RADIATION ONCOLOGY | Facility: HOSPITAL | Age: 73
End: 2023-12-28
Payer: MEDICARE

## 2023-12-28 ENCOUNTER — ONCOLOGY VISIT (OUTPATIENT)
Dept: ONCOLOGY | Facility: OTHER | Age: 73
End: 2023-12-28
Attending: INTERNAL MEDICINE
Payer: MEDICARE

## 2023-12-28 VITALS
SYSTOLIC BLOOD PRESSURE: 146 MMHG | WEIGHT: 217.81 LBS | DIASTOLIC BLOOD PRESSURE: 78 MMHG | TEMPERATURE: 98.8 F | BODY MASS INDEX: 34.11 KG/M2 | HEART RATE: 50 BPM | OXYGEN SATURATION: 98 %

## 2023-12-28 DIAGNOSIS — C20 RECTAL CANCER (H): Primary | ICD-10-CM

## 2023-12-28 DIAGNOSIS — C20 RECTAL CANCER (H): ICD-10-CM

## 2023-12-28 LAB
ALBUMIN SERPL BCG-MCNC: 4.3 G/DL (ref 3.5–5.2)
ALP SERPL-CCNC: 71 U/L (ref 40–150)
ALT SERPL W P-5'-P-CCNC: 20 U/L (ref 0–70)
ANION GAP SERPL CALCULATED.3IONS-SCNC: 10 MMOL/L (ref 7–15)
AST SERPL W P-5'-P-CCNC: 27 U/L (ref 0–45)
BASOPHILS # BLD AUTO: 0.1 10E3/UL (ref 0–0.2)
BASOPHILS NFR BLD AUTO: 1 %
BILIRUB SERPL-MCNC: 0.4 MG/DL
BUN SERPL-MCNC: 23.3 MG/DL (ref 8–23)
CALCIUM SERPL-MCNC: 9 MG/DL (ref 8.8–10.2)
CHLORIDE SERPL-SCNC: 100 MMOL/L (ref 98–107)
CREAT SERPL-MCNC: 1.41 MG/DL (ref 0.67–1.17)
DEPRECATED HCO3 PLAS-SCNC: 26 MMOL/L (ref 22–29)
EGFRCR SERPLBLD CKD-EPI 2021: 53 ML/MIN/1.73M2
EOSINOPHIL # BLD AUTO: 0.4 10E3/UL (ref 0–0.7)
EOSINOPHIL NFR BLD AUTO: 6 %
ERYTHROCYTE [DISTWIDTH] IN BLOOD BY AUTOMATED COUNT: 12.2 % (ref 10–15)
GLUCOSE SERPL-MCNC: 97 MG/DL (ref 70–99)
HCT VFR BLD AUTO: 42.5 % (ref 40–53)
HGB BLD-MCNC: 14.9 G/DL (ref 13.3–17.7)
IMM GRANULOCYTES # BLD: 0 10E3/UL
IMM GRANULOCYTES NFR BLD: 0 %
LYMPHOCYTES # BLD AUTO: 1.8 10E3/UL (ref 0.8–5.3)
LYMPHOCYTES NFR BLD AUTO: 24 %
MCH RBC QN AUTO: 30.6 PG (ref 26.5–33)
MCHC RBC AUTO-ENTMCNC: 35.1 G/DL (ref 31.5–36.5)
MCV RBC AUTO: 87 FL (ref 78–100)
MONOCYTES # BLD AUTO: 0.9 10E3/UL (ref 0–1.3)
MONOCYTES NFR BLD AUTO: 12 %
NEUTROPHILS # BLD AUTO: 4.2 10E3/UL (ref 1.6–8.3)
NEUTROPHILS NFR BLD AUTO: 57 %
NRBC # BLD AUTO: 0 10E3/UL
NRBC BLD AUTO-RTO: 0 /100
PLATELET # BLD AUTO: 207 10E3/UL (ref 150–450)
POTASSIUM SERPL-SCNC: 3.6 MMOL/L (ref 3.4–5.3)
PROT SERPL-MCNC: 7 G/DL (ref 6.4–8.3)
RAD ONC ARIA COURSE ID: NORMAL
RAD ONC ARIA COURSE LAST TREATMENT DATE: NORMAL
RAD ONC ARIA COURSE START DATE: NORMAL
RAD ONC ARIA COURSE TREATMENT ELAPSED DAYS: 0
RAD ONC ARIA FIRST TREATMENT DATE: NORMAL
RAD ONC ARIA PLAN FRACTIONS TREATED TO DATE: 1
RAD ONC ARIA PLAN ID: NORMAL
RAD ONC ARIA PLAN PRESCRIBED DOSE PER FRACTION: 2 GY
RAD ONC ARIA PLAN TOTAL FRACTIONS PRESCRIBED: 25
RAD ONC ARIA PLAN TOTAL PRESCRIBED DOSE: 5000 CGY
RAD ONC ARIA REFERENCE POINT DOSAGE GIVEN TO DATE: NORMAL GY
RAD ONC ARIA REFERENCE POINT ID: NORMAL
RBC # BLD AUTO: 4.87 10E6/UL (ref 4.4–5.9)
SODIUM SERPL-SCNC: 136 MMOL/L (ref 135–145)
WBC # BLD AUTO: 7.3 10E3/UL (ref 4–11)

## 2023-12-28 PROCEDURE — G0463 HOSPITAL OUTPT CLINIC VISIT: HCPCS

## 2023-12-28 PROCEDURE — 85025 COMPLETE CBC W/AUTO DIFF WBC: CPT | Mod: ZL

## 2023-12-28 PROCEDURE — 36415 COLL VENOUS BLD VENIPUNCTURE: CPT | Mod: ZL

## 2023-12-28 PROCEDURE — 99214 OFFICE O/P EST MOD 30 MIN: CPT | Performed by: INTERNAL MEDICINE

## 2023-12-28 PROCEDURE — 77014 PR CT GUIDE FOR PLACEMENT RADIATION THERAPY FIELDS: CPT | Mod: 26 | Performed by: STUDENT IN AN ORGANIZED HEALTH CARE EDUCATION/TRAINING PROGRAM

## 2023-12-28 PROCEDURE — 77386 HC IMRT TREATMENT DELIVERY, COMPLEX: CPT | Performed by: RADIOLOGY

## 2023-12-28 PROCEDURE — 80053 COMPREHEN METABOLIC PANEL: CPT | Mod: ZL

## 2023-12-28 RX ORDER — LOPERAMIDE HYDROCHLORIDE 2 MG/1
2 TABLET ORAL 4 TIMES DAILY PRN
Qty: 60 TABLET | Refills: 0 | Status: SHIPPED | OUTPATIENT
Start: 2023-12-28 | End: 2024-03-08

## 2023-12-28 ASSESSMENT — PAIN SCALES - GENERAL: PAINLEVEL: NO PAIN (0)
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
## 2023-12-28 NOTE — NURSING NOTE
"Oncology Rooming Note    December 28, 2023 3:32 PM   Nicolas Malone is a 73 year old male who presents for:    Chief Complaint   Patient presents with    Oncology Clinic Visit     Follow up Rectal cancer      Initial Vitals: BP (!) 146/78   Pulse 50   Temp 98.8  F (37.1  C) (Tympanic)   Wt 98.8 kg (217 lb 13 oz)   SpO2 98%   BMI 34.11 kg/m   Estimated body mass index is 34.11 kg/m  as calculated from the following:    Height as of 12/5/23: 1.702 m (5' 7\").    Weight as of this encounter: 98.8 kg (217 lb 13 oz). Body surface area is 2.16 meters squared.  No Pain (0) Comment: Data Unavailable   No LMP for male patient.  Allergies reviewed: Yes  Medications reviewed: Yes    Medications: Medication refills not needed today.  Pharmacy name entered into [facility]:    [id number] - [place], [place] - [address] [address]  [facility] - [place], [place] - [address]    Frailty Screening:   Is the patient here for a new oncology consult visit in cancer care? 2. No      Clinical concerns: none       Jessica Kimbrough LPN             "

## 2023-12-28 NOTE — PATIENT INSTRUCTIONS
Lab every 2 weeks and follow up in clinic on 1/18/2024. Please schedule appointments close to RT appointments.

## 2023-12-29 ENCOUNTER — APPOINTMENT (OUTPATIENT)
Dept: RADIATION ONCOLOGY | Facility: HOSPITAL | Age: 73
End: 2023-12-29
Payer: MEDICARE

## 2023-12-29 ENCOUNTER — RESULTS ONLY (OUTPATIENT)
Dept: RADIATION ONCOLOGY | Facility: HOSPITAL | Age: 73
End: 2023-12-29

## 2023-12-29 LAB
RAD ONC ARIA COURSE ID: NORMAL
RAD ONC ARIA COURSE LAST TREATMENT DATE: NORMAL
RAD ONC ARIA COURSE START DATE: NORMAL
RAD ONC ARIA COURSE TREATMENT ELAPSED DAYS: 1
RAD ONC ARIA FIRST TREATMENT DATE: NORMAL
RAD ONC ARIA PLAN FRACTIONS TREATED TO DATE: 2
RAD ONC ARIA PLAN ID: NORMAL
RAD ONC ARIA PLAN PRESCRIBED DOSE PER FRACTION: 2 GY
RAD ONC ARIA PLAN TOTAL FRACTIONS PRESCRIBED: 25
RAD ONC ARIA PLAN TOTAL PRESCRIBED DOSE: 5000 CGY
RAD ONC ARIA REFERENCE POINT DOSAGE GIVEN TO DATE: NORMAL GY
RAD ONC ARIA REFERENCE POINT ID: NORMAL

## 2023-12-29 PROCEDURE — 77014 HC CT GUIDE FOR PLACEMENT RADIATION THERAPY FIELDS: CPT | Performed by: RADIOLOGY

## 2023-12-29 PROCEDURE — 77014 PR CT GUIDE FOR PLACEMENT RADIATION THERAPY FIELDS: CPT | Mod: 26 | Performed by: STUDENT IN AN ORGANIZED HEALTH CARE EDUCATION/TRAINING PROGRAM

## 2023-12-29 PROCEDURE — 77386 HC IMRT TREATMENT DELIVERY, COMPLEX: CPT | Performed by: RADIOLOGY

## 2024-01-02 ENCOUNTER — RESULTS ONLY (OUTPATIENT)
Dept: RADIATION ONCOLOGY | Facility: HOSPITAL | Age: 74
End: 2024-01-02

## 2024-01-02 ENCOUNTER — APPOINTMENT (OUTPATIENT)
Dept: RADIATION ONCOLOGY | Facility: HOSPITAL | Age: 74
End: 2024-01-02
Payer: MEDICARE

## 2024-01-02 ENCOUNTER — OFFICE VISIT (OUTPATIENT)
Dept: RADIATION ONCOLOGY | Facility: HOSPITAL | Age: 74
End: 2024-01-02
Payer: MEDICARE

## 2024-01-02 VITALS
WEIGHT: 219.8 LBS | BODY MASS INDEX: 34.43 KG/M2 | RESPIRATION RATE: 16 BRPM | TEMPERATURE: 96.8 F | OXYGEN SATURATION: 96 % | DIASTOLIC BLOOD PRESSURE: 74 MMHG | SYSTOLIC BLOOD PRESSURE: 152 MMHG | HEART RATE: 52 BPM

## 2024-01-02 DIAGNOSIS — C20 RECTAL CANCER (H): Primary | ICD-10-CM

## 2024-01-02 LAB
RAD ONC ARIA COURSE ID: NORMAL
RAD ONC ARIA COURSE LAST TREATMENT DATE: NORMAL
RAD ONC ARIA COURSE START DATE: NORMAL
RAD ONC ARIA COURSE TREATMENT ELAPSED DAYS: 5
RAD ONC ARIA FIRST TREATMENT DATE: NORMAL
RAD ONC ARIA PLAN FRACTIONS TREATED TO DATE: 3
RAD ONC ARIA PLAN ID: NORMAL
RAD ONC ARIA PLAN PRESCRIBED DOSE PER FRACTION: 2 GY
RAD ONC ARIA PLAN TOTAL FRACTIONS PRESCRIBED: 25
RAD ONC ARIA PLAN TOTAL PRESCRIBED DOSE: 5000 CGY
RAD ONC ARIA REFERENCE POINT DOSAGE GIVEN TO DATE: NORMAL GY
RAD ONC ARIA REFERENCE POINT ID: NORMAL

## 2024-01-02 ASSESSMENT — PAIN SCALES - GENERAL: PAINLEVEL: NO PAIN (0)

## 2024-01-02 NOTE — PROGRESS NOTES
Progress Notes  Encounter Date: Jan 2, 2024       RADIATION ONCOLOGY WEEKLY MANAGEMENT PROGRESS NOTE     Patient Care Team         Relationship Specialty Notifications Start End    No Ref-Primary, Physician PCP - General   4/25/23     Fax: [phone]         Greg Waddell MD Assigned Surgical Provider   11/25/23     Phone: [phone] Fax: [phone]         [address] [address]    Tommy Rodriguez MD Surgeon Surgery  12/6/23     Phone: [phone] Fax: [phone]         [address] [place] [address]    Angélica Estrada MD MD Hematology & Oncology  12/6/23     Phone: [phone] Fax: [phone]         [address] [address]    Benson Huerta MD Assigned Cancer Care Provider   12/16/23     Phone: [phone] Fax: [phone]         [address] [place] [address]                    DIAGNOSIS:  Cancer Staging   No matching staging information was found for the patient.          RADIATION THERAPY:    Nicolas Malone has received 600 cGy to date to Rectum and pelvic LNs.   Treatment 3/25  Boost 0/3  Total planned dose: 5540 cGy      SUBJECTIVE:    Currently he finds treatment to be easy.  He has no concerns.             Depression Screening Follow Up        12/5/2023     1:00 PM   PHQ   PHQ-9 Total Score 0   Q9: Thoughts of better off dead/self-harm past 2 weeks Not at all         12/5/2023     1:00 PM   Last PHQ-9   1.  Little interest or pleasure in doing things 0   2.  Feeling down, depressed, or hopeless 0   3.  Trouble falling or staying asleep, or sleeping too much 0   4.  Feeling tired or having little energy 0   5.  Poor appetite or overeating 0   6.  Feeling bad about yourself 0   7.  Trouble concentrating 0   8.  Moving slowly or restless 0   Q9: Thoughts of better off dead/self-harm past 2 weeks 0   PHQ-9 Total Score 0       Follow Up Actions Taken  Patient counseled, no additional follow up at this time.       OBJECTIVE:    WEIGHT: 219 lbs 12.8 oz. BP (!)  152/74 (BP Location: Left arm, Patient Position: Chair, Cuff Size: Adult Regular)   Pulse 52   Temp 96.8  F (36  C) (Tympanic)   Resp 16   Wt 99.7 kg (219 lb 12.8 oz)   SpO2 96%   BMI 34.43 kg/m    Examination reveals an alert non ill appearing male patient, respirations non labored.        IMPRESSION:  Routine tolerance to radiation therapy.       PLAN:  Continue treatment as planned.        Medical record and imaging reviewed by covering locum provider.      Flores Adams DNP, APRN, FNP-C   Morgan Mehta MD  Department of Radiation Oncology

## 2024-01-03 ENCOUNTER — APPOINTMENT (OUTPATIENT)
Dept: RADIATION ONCOLOGY | Facility: HOSPITAL | Age: 74
End: 2024-01-03
Payer: MEDICARE

## 2024-01-03 ENCOUNTER — RESULTS ONLY (OUTPATIENT)
Dept: RADIATION ONCOLOGY | Facility: HOSPITAL | Age: 74
End: 2024-01-03

## 2024-01-03 LAB
RAD ONC ARIA COURSE ID: NORMAL
RAD ONC ARIA COURSE LAST TREATMENT DATE: NORMAL
RAD ONC ARIA COURSE START DATE: NORMAL
RAD ONC ARIA COURSE TREATMENT ELAPSED DAYS: 6
RAD ONC ARIA FIRST TREATMENT DATE: NORMAL
RAD ONC ARIA PLAN FRACTIONS TREATED TO DATE: 4
RAD ONC ARIA PLAN ID: NORMAL
RAD ONC ARIA PLAN PRESCRIBED DOSE PER FRACTION: 2 GY
RAD ONC ARIA PLAN TOTAL FRACTIONS PRESCRIBED: 25
RAD ONC ARIA PLAN TOTAL PRESCRIBED DOSE: 5000 CGY
RAD ONC ARIA REFERENCE POINT DOSAGE GIVEN TO DATE: NORMAL GY
RAD ONC ARIA REFERENCE POINT ID: NORMAL

## 2024-01-03 PROCEDURE — 77014 PR CT GUIDE FOR PLACEMENT RADIATION THERAPY FIELDS: CPT | Mod: 26 | Performed by: RADIOLOGY

## 2024-01-03 PROCEDURE — 77386 HC IMRT TREATMENT DELIVERY, COMPLEX: CPT | Performed by: RADIOLOGY

## 2024-01-03 PROCEDURE — 77014 HC CT GUIDE FOR PLACEMENT RADIATION THERAPY FIELDS: CPT | Performed by: RADIOLOGY

## 2024-01-03 ASSESSMENT — PATIENT HEALTH QUESTIONNAIRE - PHQ9: SUM OF ALL RESPONSES TO PHQ QUESTIONS 1-9: 0

## 2024-01-04 ENCOUNTER — APPOINTMENT (OUTPATIENT)
Dept: RADIATION ONCOLOGY | Facility: HOSPITAL | Age: 74
End: 2024-01-04
Payer: MEDICARE

## 2024-01-04 ENCOUNTER — DOCUMENTATION ONLY (OUTPATIENT)
Dept: ONCOLOGY | Facility: OTHER | Age: 74
End: 2024-01-04

## 2024-01-04 ENCOUNTER — RESULTS ONLY (OUTPATIENT)
Dept: RADIATION ONCOLOGY | Facility: HOSPITAL | Age: 74
End: 2024-01-04

## 2024-01-04 LAB
RAD ONC ARIA COURSE ID: NORMAL
RAD ONC ARIA COURSE LAST TREATMENT DATE: NORMAL
RAD ONC ARIA COURSE START DATE: NORMAL
RAD ONC ARIA COURSE TREATMENT ELAPSED DAYS: 7
RAD ONC ARIA FIRST TREATMENT DATE: NORMAL
RAD ONC ARIA PLAN FRACTIONS TREATED TO DATE: 5
RAD ONC ARIA PLAN ID: NORMAL
RAD ONC ARIA PLAN PRESCRIBED DOSE PER FRACTION: 2 GY
RAD ONC ARIA PLAN TOTAL FRACTIONS PRESCRIBED: 25
RAD ONC ARIA PLAN TOTAL PRESCRIBED DOSE: 5000 CGY
RAD ONC ARIA REFERENCE POINT DOSAGE GIVEN TO DATE: NORMAL GY
RAD ONC ARIA REFERENCE POINT ID: NORMAL

## 2024-01-04 PROCEDURE — 77427 RADIATION TX MANAGEMENT X5: CPT | Performed by: RADIOLOGY

## 2024-01-04 PROCEDURE — 77014 PR CT GUIDE FOR PLACEMENT RADIATION THERAPY FIELDS: CPT | Mod: 26 | Performed by: RADIOLOGY

## 2024-01-04 PROCEDURE — 77336 RADIATION PHYSICS CONSULT: CPT | Performed by: RADIOLOGY

## 2024-01-04 PROCEDURE — 77386 HC IMRT TREATMENT DELIVERY, COMPLEX: CPT | Performed by: RADIOLOGY

## 2024-01-04 PROCEDURE — 77014 HC CT GUIDE FOR PLACEMENT RADIATION THERAPY FIELDS: CPT | Performed by: RADIOLOGY

## 2024-01-04 NOTE — PROGRESS NOTES
"Oral Chemotherapy Monitoring Program    Primary Oncologist: Dr. Estrada  Drug: Xeloda 1500 mg BID Monday-Friday with radiation  Start Date: 12/28/23    Subjective/Objective:  Nicolas Malone is a 73 year old male contacted by phone for a follow-up visit for oral chemotherapy.          12/7/2023    10:00 AM 1/4/2024    12:00 PM   ORAL CHEMOTHERAPY   Assessment Type New Teach Initial Follow up   Diagnosis Code Rectal Cancer Rectal Cancer   Providers Sean Estrada   Clinic Name/Location [facility]   Drug Name Xeloda (capecitabine) Xeloda (capecitabine)   Dose 1,500 mg 1,500 mg   Current Schedule BID BID   Cycle Details M-F only during XRT M-F only during XRT   Start Date of Last Cycle  12/28/2023   Doses missed in last 2 weeks  1   Adherence Assessment  Adherent   Adverse Effects  No AE identified during assessment   Home BPs  Not applicable   Any new drug interactions?  No   Is the dose as ordered appropriate for the patient?  Yes   Is the patient currently in pain?  No   Has the patient been assessed within the past 6 months for depression?  Yes   Has the patient missed any days of school, work, or other routine activity?  No   Since the last time we talked, have you been hospitalized or used the emergency room?  No       Vitals:  BP:   BP Readings from Last 1 Encounters:   01/02/24 (!) 152/74     Wt Readings from Last 1 Encounters:   01/02/24 99.7 kg (219 lb 12.8 oz)     Estimated body surface area is 2.17 meters squared as calculated from the following:    Height as of 12/5/23: 1.702 m (5' 7\").    Weight as of 1/2/24: 99.7 kg (219 lb 12.8 oz).    Labs:  _  Result Component Current Result Ref Range   Sodium 136 (12/28/2023) 135 - 145 mmol/L     _  Result Component Current Result Ref Range   Potassium 3.6 (12/28/2023) 3.4 - 5.3 mmol/L     _  Result Component Current Result Ref Range   Calcium 9.0 (12/28/2023) 8.8 - 10.2 mg/dL     No results found for Mag within last 30 days.     No results found for Phos within last " 30 days.     _  Result Component Current Result Ref Range   Albumin 4.3 (12/28/2023) 3.5 - 5.2 g/dL     _  Result Component Current Result Ref Range   Urea Nitrogen 23.3 (H) (12/28/2023) 8.0 - 23.0 mg/dL     _  Result Component Current Result Ref Range   Creatinine 1.41 (H) (12/28/2023) 0.67 - 1.17 mg/dL       _  Result Component Current Result Ref Range   AST 27 (12/28/2023) 0 - 45 U/L     _  Result Component Current Result Ref Range   ALT 20 (12/28/2023) 0 - 70 U/L     _  Result Component Current Result Ref Range   Bilirubin Total 0.4 (12/28/2023) <=1.2 mg/dL       _  Result Component Current Result Ref Range   WBC Count 7.3 (12/28/2023) 4.0 - 11.0 10e3/uL     _  Result Component Current Result Ref Range   Hemoglobin 14.9 (12/28/2023) 13.3 - 17.7 g/dL     _  Result Component Current Result Ref Range   Platelet Count 207 (12/28/2023) 150 - 450 10e3/uL     No results found for ANC within last 30 days.         Assessment/Plan:  I spoke with patient today. Patient reports doing well on therapy. No issues or concerns reported. Patient is compliant with therapy and but did miss one morning dose of his Xeloda. Future appointments have been confirmed with patient. We will continue to follow.      Follow-Up:  1/11/24- Lab appointment      [facility]  Oncology Pharmacy Intern  [facility]  [phone]

## 2024-01-05 ENCOUNTER — RESULTS ONLY (OUTPATIENT)
Dept: RADIATION ONCOLOGY | Facility: HOSPITAL | Age: 74
End: 2024-01-05

## 2024-01-05 ENCOUNTER — APPOINTMENT (OUTPATIENT)
Dept: RADIATION ONCOLOGY | Facility: HOSPITAL | Age: 74
End: 2024-01-05
Payer: MEDICARE

## 2024-01-05 LAB
RAD ONC ARIA COURSE ID: NORMAL
RAD ONC ARIA COURSE LAST TREATMENT DATE: NORMAL
RAD ONC ARIA COURSE START DATE: NORMAL
RAD ONC ARIA COURSE TREATMENT ELAPSED DAYS: 8
RAD ONC ARIA FIRST TREATMENT DATE: NORMAL
RAD ONC ARIA PLAN FRACTIONS TREATED TO DATE: 6
RAD ONC ARIA PLAN ID: NORMAL
RAD ONC ARIA PLAN PRESCRIBED DOSE PER FRACTION: 2 GY
RAD ONC ARIA PLAN TOTAL FRACTIONS PRESCRIBED: 25
RAD ONC ARIA PLAN TOTAL PRESCRIBED DOSE: 5000 CGY
RAD ONC ARIA REFERENCE POINT DOSAGE GIVEN TO DATE: NORMAL GY
RAD ONC ARIA REFERENCE POINT ID: NORMAL

## 2024-01-05 PROCEDURE — 77014 HC CT GUIDE FOR PLACEMENT RADIATION THERAPY FIELDS: CPT | Performed by: RADIOLOGY

## 2024-01-05 PROCEDURE — 77386 HC IMRT TREATMENT DELIVERY, COMPLEX: CPT | Performed by: RADIOLOGY

## 2024-01-05 PROCEDURE — 77014 PR CT GUIDE FOR PLACEMENT RADIATION THERAPY FIELDS: CPT | Mod: 26 | Performed by: RADIOLOGY

## 2024-01-08 ENCOUNTER — APPOINTMENT (OUTPATIENT)
Dept: RADIATION ONCOLOGY | Facility: HOSPITAL | Age: 74
End: 2024-01-08
Payer: MEDICARE

## 2024-01-08 ENCOUNTER — RESULTS ONLY (OUTPATIENT)
Dept: RADIATION ONCOLOGY | Facility: HOSPITAL | Age: 74
End: 2024-01-08

## 2024-01-08 LAB
RAD ONC ARIA COURSE ID: NORMAL
RAD ONC ARIA COURSE LAST TREATMENT DATE: NORMAL
RAD ONC ARIA COURSE START DATE: NORMAL
RAD ONC ARIA COURSE TREATMENT ELAPSED DAYS: 11
RAD ONC ARIA FIRST TREATMENT DATE: NORMAL
RAD ONC ARIA PLAN FRACTIONS TREATED TO DATE: 7
RAD ONC ARIA PLAN ID: NORMAL
RAD ONC ARIA PLAN PRESCRIBED DOSE PER FRACTION: 2 GY
RAD ONC ARIA PLAN TOTAL FRACTIONS PRESCRIBED: 25
RAD ONC ARIA PLAN TOTAL PRESCRIBED DOSE: 5000 CGY
RAD ONC ARIA REFERENCE POINT DOSAGE GIVEN TO DATE: NORMAL GY
RAD ONC ARIA REFERENCE POINT ID: NORMAL

## 2024-01-08 PROCEDURE — 77014 HC CT GUIDE FOR PLACEMENT RADIATION THERAPY FIELDS: CPT | Performed by: RADIOLOGY

## 2024-01-08 PROCEDURE — 77386 HC IMRT TREATMENT DELIVERY, COMPLEX: CPT | Performed by: RADIOLOGY

## 2024-01-08 PROCEDURE — 77014 PR CT GUIDE FOR PLACEMENT RADIATION THERAPY FIELDS: CPT | Mod: 26 | Performed by: RADIOLOGY

## 2024-01-09 ENCOUNTER — RESULTS ONLY (OUTPATIENT)
Dept: RADIATION ONCOLOGY | Facility: HOSPITAL | Age: 74
End: 2024-01-09

## 2024-01-09 ENCOUNTER — OFFICE VISIT (OUTPATIENT)
Dept: RADIATION ONCOLOGY | Facility: HOSPITAL | Age: 74
End: 2024-01-09
Payer: MEDICARE

## 2024-01-09 VITALS
BODY MASS INDEX: 34.22 KG/M2 | WEIGHT: 218.5 LBS | RESPIRATION RATE: 20 BRPM | SYSTOLIC BLOOD PRESSURE: 154 MMHG | TEMPERATURE: 97.8 F | HEART RATE: 53 BPM | OXYGEN SATURATION: 97 % | DIASTOLIC BLOOD PRESSURE: 68 MMHG

## 2024-01-09 DIAGNOSIS — C20 MALIGNANT NEOPLASM OF RECTUM (H): Primary | ICD-10-CM

## 2024-01-09 LAB
RAD ONC ARIA COURSE ID: NORMAL
RAD ONC ARIA COURSE LAST TREATMENT DATE: NORMAL
RAD ONC ARIA COURSE START DATE: NORMAL
RAD ONC ARIA COURSE TREATMENT ELAPSED DAYS: 12
RAD ONC ARIA FIRST TREATMENT DATE: NORMAL
RAD ONC ARIA PLAN FRACTIONS TREATED TO DATE: 8
RAD ONC ARIA PLAN ID: NORMAL
RAD ONC ARIA PLAN PRESCRIBED DOSE PER FRACTION: 2 GY
RAD ONC ARIA PLAN TOTAL FRACTIONS PRESCRIBED: 25
RAD ONC ARIA PLAN TOTAL PRESCRIBED DOSE: 5000 CGY
RAD ONC ARIA REFERENCE POINT DOSAGE GIVEN TO DATE: NORMAL GY
RAD ONC ARIA REFERENCE POINT ID: NORMAL

## 2024-01-09 ASSESSMENT — PAIN SCALES - GENERAL: PAINLEVEL: NO PAIN (0)

## 2024-01-09 NOTE — PROGRESS NOTES
Progress Notes  Encounter Date: Jan 9, 2024  RADIATION ONCOLOGY WEEKLY MANAGEMENT PROGRESS NOTE     Patient Care Team         Relationship Specialty Notifications Start End    No Ref-Primary, Physician PCP - General   4/25/23     Fax: [phone]         Greg Waddell MD Assigned Surgical Provider   11/25/23     Phone: [phone] Fax: [phone]         [address] [address]    Tommy Rodriguez MD Surgeon Surgery  12/6/23     Phone: [phone] Fax: [phone]         [address] [place] [address]    Angélica Estrada MD MD Hematology & Oncology  12/6/23     Phone: [phone] Fax: [phone]         [address] [address]    Benson Huerta MD Assigned Cancer Care Provider   12/16/23     Phone: [phone] Fax: [phone]         [address] [place] [address]                DIAGNOSIS:  Cancer Staging   No matching staging information was found for the patient.          RADIATION THERAPY:    Nicolas Malone has received 1600 cGy to date to Rectum and pelvic LNs.   Treatment 8/25  Boost 0/3  Total planned dose: 5540 cGy      SUBJECTIVE:    Currently he finds treatment to be going well, notes struggle to fill and hold bladder.  Feels he may be having more frequent bowel movements that are softer but does not find this to bothersome at this time.       OBJECTIVE:    WEIGHT: 218 lbs 8 oz. BP (!) 154/68 (BP Location: Left arm, Patient Position: Chair, Cuff Size: Adult Regular)   Pulse 53   Temp 97.8  F (36.6  C) (Tympanic)   Resp 20   Wt 99.1 kg (218 lb 8 oz)   SpO2 97%   BMI 34.22 kg/m    Examination reveals an alert non ill appearing male patient, respirations non labored.        IMPRESSION:  Routine tolerance to radiation therapy.       PLAN:  Continue treatment as planned. Declines intervention at this time, feels symptoms are manageable        Medical record and imaging reviewed by covering locum provider.      Flores Adams DNP, APRN, FNP-C   Devang Escalona,  MD

## 2024-01-10 ENCOUNTER — RESULTS ONLY (OUTPATIENT)
Dept: RADIATION ONCOLOGY | Facility: HOSPITAL | Age: 74
End: 2024-01-10

## 2024-01-10 ENCOUNTER — APPOINTMENT (OUTPATIENT)
Dept: RADIATION ONCOLOGY | Facility: HOSPITAL | Age: 74
End: 2024-01-10
Payer: MEDICARE

## 2024-01-10 DIAGNOSIS — C20 RECTAL CANCER (H): Primary | ICD-10-CM

## 2024-01-10 LAB
RAD ONC ARIA COURSE ID: NORMAL
RAD ONC ARIA COURSE LAST TREATMENT DATE: NORMAL
RAD ONC ARIA COURSE START DATE: NORMAL
RAD ONC ARIA COURSE TREATMENT ELAPSED DAYS: 13
RAD ONC ARIA FIRST TREATMENT DATE: NORMAL
RAD ONC ARIA PLAN FRACTIONS TREATED TO DATE: 9
RAD ONC ARIA PLAN ID: NORMAL
RAD ONC ARIA PLAN PRESCRIBED DOSE PER FRACTION: 2 GY
RAD ONC ARIA PLAN TOTAL FRACTIONS PRESCRIBED: 25
RAD ONC ARIA PLAN TOTAL PRESCRIBED DOSE: 5000 CGY
RAD ONC ARIA REFERENCE POINT DOSAGE GIVEN TO DATE: NORMAL GY
RAD ONC ARIA REFERENCE POINT ID: NORMAL

## 2024-01-10 PROCEDURE — 77014 HC CT GUIDE FOR PLACEMENT RADIATION THERAPY FIELDS: CPT | Performed by: RADIOLOGY

## 2024-01-10 PROCEDURE — 77014 PR CT GUIDE FOR PLACEMENT RADIATION THERAPY FIELDS: CPT | Mod: 26 | Performed by: RADIOLOGY

## 2024-01-10 PROCEDURE — 77386 HC IMRT TREATMENT DELIVERY, COMPLEX: CPT | Performed by: RADIOLOGY

## 2024-01-10 RX ORDER — CAPECITABINE 500 MG/1
1500 TABLET, FILM COATED ORAL 2 TIMES DAILY
Qty: 48 TABLET | Refills: 0 | Status: SHIPPED | OUTPATIENT
Start: 2024-01-10 | End: 2024-02-14

## 2024-01-11 ENCOUNTER — RESULTS ONLY (OUTPATIENT)
Dept: RADIATION ONCOLOGY | Facility: HOSPITAL | Age: 74
End: 2024-01-11

## 2024-01-11 LAB
RAD ONC ARIA COURSE ID: NORMAL
RAD ONC ARIA COURSE LAST TREATMENT DATE: NORMAL
RAD ONC ARIA COURSE START DATE: NORMAL
RAD ONC ARIA COURSE TREATMENT ELAPSED DAYS: 14
RAD ONC ARIA FIRST TREATMENT DATE: NORMAL
RAD ONC ARIA PLAN FRACTIONS TREATED TO DATE: 10
RAD ONC ARIA PLAN ID: NORMAL
RAD ONC ARIA PLAN PRESCRIBED DOSE PER FRACTION: 2 GY
RAD ONC ARIA PLAN TOTAL FRACTIONS PRESCRIBED: 25
RAD ONC ARIA PLAN TOTAL PRESCRIBED DOSE: 5000 CGY
RAD ONC ARIA REFERENCE POINT DOSAGE GIVEN TO DATE: NORMAL GY
RAD ONC ARIA REFERENCE POINT ID: NORMAL

## 2024-01-12 ENCOUNTER — RESULTS ONLY (OUTPATIENT)
Dept: RADIATION ONCOLOGY | Facility: HOSPITAL | Age: 74
End: 2024-01-12

## 2024-01-12 ENCOUNTER — LAB (OUTPATIENT)
Dept: ONCOLOGY | Facility: OTHER | Age: 74
End: 2024-01-12
Attending: INTERNAL MEDICINE
Payer: MEDICARE

## 2024-01-12 ENCOUNTER — APPOINTMENT (OUTPATIENT)
Dept: RADIATION ONCOLOGY | Facility: HOSPITAL | Age: 74
End: 2024-01-12
Payer: MEDICARE

## 2024-01-12 DIAGNOSIS — C20 RECTAL CANCER (H): ICD-10-CM

## 2024-01-12 DIAGNOSIS — C20 RECTAL CANCER (H): Primary | ICD-10-CM

## 2024-01-12 LAB
ALBUMIN SERPL BCG-MCNC: 4.2 G/DL (ref 3.5–5.2)
ALP SERPL-CCNC: 65 U/L (ref 40–150)
ALT SERPL W P-5'-P-CCNC: 20 U/L (ref 0–70)
ANION GAP SERPL CALCULATED.3IONS-SCNC: 8 MMOL/L (ref 7–15)
AST SERPL W P-5'-P-CCNC: 30 U/L (ref 0–45)
BASOPHILS # BLD AUTO: 0 10E3/UL (ref 0–0.2)
BASOPHILS NFR BLD AUTO: 1 %
BILIRUB SERPL-MCNC: 0.6 MG/DL
BUN SERPL-MCNC: 21.4 MG/DL (ref 8–23)
CALCIUM SERPL-MCNC: 9.3 MG/DL (ref 8.8–10.2)
CHLORIDE SERPL-SCNC: 100 MMOL/L (ref 98–107)
CREAT SERPL-MCNC: 1.22 MG/DL (ref 0.67–1.17)
DEPRECATED HCO3 PLAS-SCNC: 29 MMOL/L (ref 22–29)
EGFRCR SERPLBLD CKD-EPI 2021: 63 ML/MIN/1.73M2
EOSINOPHIL # BLD AUTO: 0.3 10E3/UL (ref 0–0.7)
EOSINOPHIL NFR BLD AUTO: 8 %
ERYTHROCYTE [DISTWIDTH] IN BLOOD BY AUTOMATED COUNT: 12.2 % (ref 10–15)
GLUCOSE SERPL-MCNC: 158 MG/DL (ref 70–99)
HCT VFR BLD AUTO: 41.4 % (ref 40–53)
HGB BLD-MCNC: 14.6 G/DL (ref 13.3–17.7)
IMM GRANULOCYTES # BLD: 0 10E3/UL
IMM GRANULOCYTES NFR BLD: 0 %
LYMPHOCYTES # BLD AUTO: 0.7 10E3/UL (ref 0.8–5.3)
LYMPHOCYTES NFR BLD AUTO: 16 %
MCH RBC QN AUTO: 30.9 PG (ref 26.5–33)
MCHC RBC AUTO-ENTMCNC: 35.3 G/DL (ref 31.5–36.5)
MCV RBC AUTO: 88 FL (ref 78–100)
MONOCYTES # BLD AUTO: 0.4 10E3/UL (ref 0–1.3)
MONOCYTES NFR BLD AUTO: 9 %
NEUTROPHILS # BLD AUTO: 2.7 10E3/UL (ref 1.6–8.3)
NEUTROPHILS NFR BLD AUTO: 66 %
NRBC # BLD AUTO: 0 10E3/UL
NRBC BLD AUTO-RTO: 0 /100
PLATELET # BLD AUTO: 122 10E3/UL (ref 150–450)
POTASSIUM SERPL-SCNC: 3.2 MMOL/L (ref 3.4–5.3)
PROT SERPL-MCNC: 6.3 G/DL (ref 6.4–8.3)
RAD ONC ARIA COURSE ID: NORMAL
RAD ONC ARIA COURSE LAST TREATMENT DATE: NORMAL
RAD ONC ARIA COURSE START DATE: NORMAL
RAD ONC ARIA COURSE TREATMENT ELAPSED DAYS: 15
RAD ONC ARIA FIRST TREATMENT DATE: NORMAL
RAD ONC ARIA PLAN FRACTIONS TREATED TO DATE: 11
RAD ONC ARIA PLAN ID: NORMAL
RAD ONC ARIA PLAN PRESCRIBED DOSE PER FRACTION: 2 GY
RAD ONC ARIA PLAN TOTAL FRACTIONS PRESCRIBED: 25
RAD ONC ARIA PLAN TOTAL PRESCRIBED DOSE: 5000 CGY
RAD ONC ARIA REFERENCE POINT DOSAGE GIVEN TO DATE: NORMAL GY
RAD ONC ARIA REFERENCE POINT ID: NORMAL
RBC # BLD AUTO: 4.73 10E6/UL (ref 4.4–5.9)
SODIUM SERPL-SCNC: 137 MMOL/L (ref 135–145)
WBC # BLD AUTO: 4.1 10E3/UL (ref 4–11)

## 2024-01-12 PROCEDURE — 80053 COMPREHEN METABOLIC PANEL: CPT | Mod: ZL

## 2024-01-12 PROCEDURE — 77014 PR CT GUIDE FOR PLACEMENT RADIATION THERAPY FIELDS: CPT | Mod: 26 | Performed by: RADIOLOGY

## 2024-01-12 PROCEDURE — 77386 HC IMRT TREATMENT DELIVERY, COMPLEX: CPT | Performed by: RADIOLOGY

## 2024-01-12 PROCEDURE — 36415 COLL VENOUS BLD VENIPUNCTURE: CPT | Mod: ZL

## 2024-01-12 PROCEDURE — 85025 COMPLETE CBC W/AUTO DIFF WBC: CPT | Mod: ZL

## 2024-01-12 RX ORDER — POTASSIUM CHLORIDE 1500 MG/1
20 TABLET, EXTENDED RELEASE ORAL DAILY
Qty: 10 TABLET | Refills: 0 | Status: SHIPPED | OUTPATIENT
Start: 2024-01-12 | End: 2024-01-22

## 2024-01-15 ENCOUNTER — RESULTS ONLY (OUTPATIENT)
Dept: RADIATION ONCOLOGY | Facility: HOSPITAL | Age: 74
End: 2024-01-15

## 2024-01-15 ENCOUNTER — APPOINTMENT (OUTPATIENT)
Dept: RADIATION ONCOLOGY | Facility: HOSPITAL | Age: 74
End: 2024-01-15
Payer: MEDICARE

## 2024-01-15 LAB
RAD ONC ARIA COURSE ID: NORMAL
RAD ONC ARIA COURSE LAST TREATMENT DATE: NORMAL
RAD ONC ARIA COURSE START DATE: NORMAL
RAD ONC ARIA COURSE TREATMENT ELAPSED DAYS: 18
RAD ONC ARIA FIRST TREATMENT DATE: NORMAL
RAD ONC ARIA PLAN FRACTIONS TREATED TO DATE: 12
RAD ONC ARIA PLAN ID: NORMAL
RAD ONC ARIA PLAN PRESCRIBED DOSE PER FRACTION: 2 GY
RAD ONC ARIA PLAN TOTAL FRACTIONS PRESCRIBED: 25
RAD ONC ARIA PLAN TOTAL PRESCRIBED DOSE: 5000 CGY
RAD ONC ARIA REFERENCE POINT DOSAGE GIVEN TO DATE: NORMAL GY
RAD ONC ARIA REFERENCE POINT ID: NORMAL

## 2024-01-15 PROCEDURE — 77014 PR CT GUIDE FOR PLACEMENT RADIATION THERAPY FIELDS: CPT | Mod: 26 | Performed by: RADIOLOGY

## 2024-01-15 PROCEDURE — 77014 HC CT GUIDE FOR PLACEMENT RADIATION THERAPY FIELDS: CPT | Performed by: RADIOLOGY

## 2024-01-15 PROCEDURE — 77386 HC IMRT TREATMENT DELIVERY, COMPLEX: CPT | Performed by: RADIOLOGY

## 2024-01-16 ENCOUNTER — OFFICE VISIT (OUTPATIENT)
Dept: RADIATION ONCOLOGY | Facility: HOSPITAL | Age: 74
End: 2024-01-16
Payer: MEDICARE

## 2024-01-16 ENCOUNTER — RESULTS ONLY (OUTPATIENT)
Dept: RADIATION ONCOLOGY | Facility: HOSPITAL | Age: 74
End: 2024-01-16

## 2024-01-16 VITALS
HEART RATE: 53 BPM | TEMPERATURE: 97.7 F | RESPIRATION RATE: 18 BRPM | WEIGHT: 221.5 LBS | BODY MASS INDEX: 34.69 KG/M2 | OXYGEN SATURATION: 98 % | SYSTOLIC BLOOD PRESSURE: 162 MMHG | DIASTOLIC BLOOD PRESSURE: 72 MMHG

## 2024-01-16 DIAGNOSIS — C20 RECTAL CANCER (H): Primary | ICD-10-CM

## 2024-01-16 LAB
RAD ONC ARIA COURSE ID: NORMAL
RAD ONC ARIA COURSE LAST TREATMENT DATE: NORMAL
RAD ONC ARIA COURSE START DATE: NORMAL
RAD ONC ARIA COURSE TREATMENT ELAPSED DAYS: 19
RAD ONC ARIA FIRST TREATMENT DATE: NORMAL
RAD ONC ARIA PLAN FRACTIONS TREATED TO DATE: 13
RAD ONC ARIA PLAN ID: NORMAL
RAD ONC ARIA PLAN PRESCRIBED DOSE PER FRACTION: 2 GY
RAD ONC ARIA PLAN TOTAL FRACTIONS PRESCRIBED: 25
RAD ONC ARIA PLAN TOTAL PRESCRIBED DOSE: 5000 CGY
RAD ONC ARIA REFERENCE POINT DOSAGE GIVEN TO DATE: NORMAL GY
RAD ONC ARIA REFERENCE POINT ID: NORMAL

## 2024-01-16 ASSESSMENT — PAIN SCALES - GENERAL: PAINLEVEL: NO PAIN (0)

## 2024-01-16 NOTE — PROGRESS NOTES
"Progress Notes  Encounter Date: Jan 16, 2024  RADIATION ONCOLOGY WEEKLY MANAGEMENT PROGRESS NOTE     Patient Care Team         Relationship Specialty Notifications Start End    No Ref-Primary, Physician PCP - General   4/25/23     Fax: [phone]         Greg Waddell MD Assigned Surgical Provider   11/25/23     Phone: [phone] Fax: [phone]         [address] [address]    Tommy Rodriguez MD Surgeon Surgery  12/6/23     Phone: [phone] Fax: [phone]         [address] [place] [address]    Angélica Estrada MD MD Hematology & Oncology  12/6/23     Phone: [phone] Fax: [phone]         [address] [address]    Benson Huerta MD Assigned Cancer Care Provider   12/16/23     Phone: [phone] Fax: [phone]         [address] [place] [address]                DIAGNOSIS:  Cancer Staging   No matching staging information was found for the patient.          RADIATION THERAPY:    Nicolas Malone has received 2600 cGy to date to Rectum and pelvic LNs.   Treatment 13/25  Boost 0/3  Total planned dose: 5540 cGy      SUBJECTIVE:    Currently he finds treatment to be going well. Feels he may be having more frequent, urgent, and looser bowel movements but has not needed Imodium. He also notes an increase in gas and states it feels like he has to \"have a bowel movement but then just gas comes out\". Reports he feels his skin is feeling more \"chapped\".     OBJECTIVE:    WEIGHT: 221 lbs 8 oz. BP (!) 162/72 (BP Location: Left arm, Patient Position: Chair, Cuff Size: Adult Regular)   Pulse 53   Temp 97.7  F (36.5  C) (Tympanic)   Resp 18   Wt 100.5 kg (221 lb 8 oz)   SpO2 98%   BMI 34.69 kg/m    Examination reveals an alert non ill appearing male patient, respirations non labored.        IMPRESSION:  Routine tolerance to radiation therapy.       PLAN:  Continue treatment as planned.     Excess gas  - Can start Gas X OTC     Diarrhea   - Imodium OTC as needed "          Medical record and imaging reviewed by covering locum provider.      Flores Adams DNP, APRN, FNP-C     Clay Garcia MD

## 2024-01-17 ENCOUNTER — APPOINTMENT (OUTPATIENT)
Dept: RADIATION ONCOLOGY | Facility: HOSPITAL | Age: 74
End: 2024-01-17
Payer: MEDICARE

## 2024-01-17 ENCOUNTER — RESULTS ONLY (OUTPATIENT)
Dept: RADIATION ONCOLOGY | Facility: HOSPITAL | Age: 74
End: 2024-01-17

## 2024-01-17 LAB
RAD ONC ARIA COURSE ID: NORMAL
RAD ONC ARIA COURSE LAST TREATMENT DATE: NORMAL
RAD ONC ARIA COURSE START DATE: NORMAL
RAD ONC ARIA COURSE TREATMENT ELAPSED DAYS: 20
RAD ONC ARIA FIRST TREATMENT DATE: NORMAL
RAD ONC ARIA PLAN FRACTIONS TREATED TO DATE: 14
RAD ONC ARIA PLAN ID: NORMAL
RAD ONC ARIA PLAN PRESCRIBED DOSE PER FRACTION: 2 GY
RAD ONC ARIA PLAN TOTAL FRACTIONS PRESCRIBED: 25
RAD ONC ARIA PLAN TOTAL PRESCRIBED DOSE: 5000 CGY
RAD ONC ARIA REFERENCE POINT DOSAGE GIVEN TO DATE: NORMAL GY
RAD ONC ARIA REFERENCE POINT ID: NORMAL

## 2024-01-17 PROCEDURE — 77014 HC CT GUIDE FOR PLACEMENT RADIATION THERAPY FIELDS: CPT | Performed by: RADIOLOGY

## 2024-01-17 PROCEDURE — 77386 HC IMRT TREATMENT DELIVERY, COMPLEX: CPT | Performed by: RADIOLOGY

## 2024-01-17 PROCEDURE — 77014 PR CT GUIDE FOR PLACEMENT RADIATION THERAPY FIELDS: CPT | Mod: 26 | Performed by: RADIOLOGY

## 2024-01-18 ENCOUNTER — RESULTS ONLY (OUTPATIENT)
Dept: RADIATION ONCOLOGY | Facility: HOSPITAL | Age: 74
End: 2024-01-18

## 2024-01-18 ENCOUNTER — APPOINTMENT (OUTPATIENT)
Dept: RADIATION ONCOLOGY | Facility: HOSPITAL | Age: 74
End: 2024-01-18
Payer: MEDICARE

## 2024-01-18 LAB
RAD ONC ARIA COURSE ID: NORMAL
RAD ONC ARIA COURSE LAST TREATMENT DATE: NORMAL
RAD ONC ARIA COURSE START DATE: NORMAL
RAD ONC ARIA COURSE TREATMENT ELAPSED DAYS: 21
RAD ONC ARIA FIRST TREATMENT DATE: NORMAL
RAD ONC ARIA PLAN FRACTIONS TREATED TO DATE: 15
RAD ONC ARIA PLAN ID: NORMAL
RAD ONC ARIA PLAN PRESCRIBED DOSE PER FRACTION: 2 GY
RAD ONC ARIA PLAN TOTAL FRACTIONS PRESCRIBED: 25
RAD ONC ARIA PLAN TOTAL PRESCRIBED DOSE: 5000 CGY
RAD ONC ARIA REFERENCE POINT DOSAGE GIVEN TO DATE: NORMAL GY
RAD ONC ARIA REFERENCE POINT ID: NORMAL

## 2024-01-18 PROCEDURE — 77427 RADIATION TX MANAGEMENT X5: CPT | Performed by: RADIOLOGY

## 2024-01-18 PROCEDURE — 77014 PR CT GUIDE FOR PLACEMENT RADIATION THERAPY FIELDS: CPT | Mod: 26 | Performed by: RADIOLOGY

## 2024-01-18 PROCEDURE — 77386 HC IMRT TREATMENT DELIVERY, COMPLEX: CPT | Performed by: RADIOLOGY

## 2024-01-18 PROCEDURE — 77336 RADIATION PHYSICS CONSULT: CPT | Performed by: RADIOLOGY

## 2024-01-18 PROCEDURE — 77014 HC CT GUIDE FOR PLACEMENT RADIATION THERAPY FIELDS: CPT | Performed by: RADIOLOGY

## 2024-01-19 ENCOUNTER — RESULTS ONLY (OUTPATIENT)
Dept: RADIATION ONCOLOGY | Facility: HOSPITAL | Age: 74
End: 2024-01-19

## 2024-01-19 ENCOUNTER — APPOINTMENT (OUTPATIENT)
Dept: RADIATION ONCOLOGY | Facility: HOSPITAL | Age: 74
End: 2024-01-19
Payer: MEDICARE

## 2024-01-19 LAB
RAD ONC ARIA COURSE ID: NORMAL
RAD ONC ARIA COURSE LAST TREATMENT DATE: NORMAL
RAD ONC ARIA COURSE START DATE: NORMAL
RAD ONC ARIA COURSE TREATMENT ELAPSED DAYS: 22
RAD ONC ARIA FIRST TREATMENT DATE: NORMAL
RAD ONC ARIA PLAN FRACTIONS TREATED TO DATE: 16
RAD ONC ARIA PLAN ID: NORMAL
RAD ONC ARIA PLAN PRESCRIBED DOSE PER FRACTION: 2 GY
RAD ONC ARIA PLAN TOTAL FRACTIONS PRESCRIBED: 25
RAD ONC ARIA PLAN TOTAL PRESCRIBED DOSE: 5000 CGY
RAD ONC ARIA REFERENCE POINT DOSAGE GIVEN TO DATE: NORMAL GY
RAD ONC ARIA REFERENCE POINT ID: NORMAL

## 2024-01-19 PROCEDURE — 77386 HC IMRT TREATMENT DELIVERY, COMPLEX: CPT | Performed by: RADIOLOGY

## 2024-01-19 PROCEDURE — 77014 PR CT GUIDE FOR PLACEMENT RADIATION THERAPY FIELDS: CPT | Mod: 26 | Performed by: RADIOLOGY

## 2024-01-19 PROCEDURE — 77014 HC CT GUIDE FOR PLACEMENT RADIATION THERAPY FIELDS: CPT | Performed by: RADIOLOGY

## 2024-01-22 ENCOUNTER — RESULTS ONLY (OUTPATIENT)
Dept: RADIATION ONCOLOGY | Facility: HOSPITAL | Age: 74
End: 2024-01-22

## 2024-01-22 ENCOUNTER — APPOINTMENT (OUTPATIENT)
Dept: RADIATION ONCOLOGY | Facility: HOSPITAL | Age: 74
End: 2024-01-22
Payer: MEDICARE

## 2024-01-22 LAB
RAD ONC ARIA COURSE ID: NORMAL
RAD ONC ARIA COURSE LAST TREATMENT DATE: NORMAL
RAD ONC ARIA COURSE START DATE: NORMAL
RAD ONC ARIA COURSE TREATMENT ELAPSED DAYS: 25
RAD ONC ARIA FIRST TREATMENT DATE: NORMAL
RAD ONC ARIA PLAN FRACTIONS TREATED TO DATE: 17
RAD ONC ARIA PLAN ID: NORMAL
RAD ONC ARIA PLAN PRESCRIBED DOSE PER FRACTION: 2 GY
RAD ONC ARIA PLAN TOTAL FRACTIONS PRESCRIBED: 25
RAD ONC ARIA PLAN TOTAL PRESCRIBED DOSE: 5000 CGY
RAD ONC ARIA REFERENCE POINT DOSAGE GIVEN TO DATE: NORMAL GY
RAD ONC ARIA REFERENCE POINT ID: NORMAL

## 2024-01-22 PROCEDURE — 77386 HC IMRT TREATMENT DELIVERY, COMPLEX: CPT | Performed by: RADIOLOGY

## 2024-01-22 PROCEDURE — 77014 HC CT GUIDE FOR PLACEMENT RADIATION THERAPY FIELDS: CPT | Performed by: RADIOLOGY

## 2024-01-22 PROCEDURE — 77014 PR CT GUIDE FOR PLACEMENT RADIATION THERAPY FIELDS: CPT | Mod: 26 | Performed by: RADIOLOGY

## 2024-01-23 ENCOUNTER — OFFICE VISIT (OUTPATIENT)
Dept: RADIATION ONCOLOGY | Facility: HOSPITAL | Age: 74
End: 2024-01-23

## 2024-01-23 ENCOUNTER — RESULTS ONLY (OUTPATIENT)
Dept: RADIATION ONCOLOGY | Facility: HOSPITAL | Age: 74
End: 2024-01-23

## 2024-01-23 ENCOUNTER — ONCOLOGY VISIT (OUTPATIENT)
Dept: ONCOLOGY | Facility: OTHER | Age: 74
End: 2024-01-23
Attending: INTERNAL MEDICINE
Payer: MEDICARE

## 2024-01-23 VITALS
HEART RATE: 63 BPM | SYSTOLIC BLOOD PRESSURE: 148 MMHG | TEMPERATURE: 97.3 F | DIASTOLIC BLOOD PRESSURE: 74 MMHG | WEIGHT: 214.29 LBS | BODY MASS INDEX: 33.56 KG/M2 | OXYGEN SATURATION: 97 %

## 2024-01-23 VITALS — RESPIRATION RATE: 16 BRPM

## 2024-01-23 DIAGNOSIS — C20 RECTAL CANCER (H): Primary | ICD-10-CM

## 2024-01-23 LAB
RAD ONC ARIA COURSE ID: NORMAL
RAD ONC ARIA COURSE LAST TREATMENT DATE: NORMAL
RAD ONC ARIA COURSE START DATE: NORMAL
RAD ONC ARIA COURSE TREATMENT ELAPSED DAYS: 26
RAD ONC ARIA FIRST TREATMENT DATE: NORMAL
RAD ONC ARIA PLAN FRACTIONS TREATED TO DATE: 18
RAD ONC ARIA PLAN ID: NORMAL
RAD ONC ARIA PLAN PRESCRIBED DOSE PER FRACTION: 2 GY
RAD ONC ARIA PLAN TOTAL FRACTIONS PRESCRIBED: 25
RAD ONC ARIA PLAN TOTAL PRESCRIBED DOSE: 5000 CGY
RAD ONC ARIA REFERENCE POINT DOSAGE GIVEN TO DATE: NORMAL GY
RAD ONC ARIA REFERENCE POINT ID: NORMAL

## 2024-01-23 PROCEDURE — 99214 OFFICE O/P EST MOD 30 MIN: CPT | Performed by: INTERNAL MEDICINE

## 2024-01-23 PROCEDURE — G0463 HOSPITAL OUTPT CLINIC VISIT: HCPCS

## 2024-01-23 ASSESSMENT — PAIN SCALES - GENERAL
PAINLEVEL: NO PAIN (0)
PAINLEVEL: NO PAIN (0)

## 2024-01-23 NOTE — NURSING NOTE
"Oncology Rooming Note    January 23, 2024 8:26 AM   Nicolas Malone is a 73 year old male who presents for:    Chief Complaint   Patient presents with    Oncology Clinic Visit     Follow up Rectal Cancer     Initial Vitals: BP (!) 148/74   Pulse 63   Temp 97.3  F (36.3  C) (Tympanic)   Wt 97.2 kg (214 lb 4.6 oz)   SpO2 97%   BMI 33.56 kg/m   Estimated body mass index is 33.56 kg/m  as calculated from the following:    Height as of 12/5/23: 1.702 m (5' 7\").    Weight as of this encounter: 97.2 kg (214 lb 4.6 oz). Body surface area is 2.14 meters squared.  No Pain (0) Comment: Data Unavailable   No LMP for male patient.  Allergies reviewed: Yes  Medications reviewed: Yes    Medications: Medication refills not needed today.  Pharmacy name entered into [facility]:    [facility] #[id number] [facility] [address]  AT [facility] [address]  [facility] - [place], [place] - [address]    Frailty Screening:   Is the patient here for a new oncology consult visit in cancer care? 2. No      Clinical concerns: none       Jessica Kimbrough LPN             "

## 2024-01-23 NOTE — PROGRESS NOTES
Progress Notes  Encounter Date: Jan 23, 2024  RADIATION ONCOLOGY WEEKLY MANAGEMENT PROGRESS NOTE     Patient Care Team         Relationship Specialty Notifications Start End    No Ref-Primary, Physician PCP - General   4/25/23     Fax: [phone]         Greg Waddell MD Assigned Surgical Provider   11/25/23     Phone: [phone] Fax: [phone]         [address] [address]    Tommy Rodriguez MD Surgeon Surgery  12/6/23     Phone: [phone] Fax: [phone]         [address] [place] [address]    Angélica Estrada MD MD Hematology & Oncology  12/6/23     Phone: [phone] Fax: [phone]         [address] [address]    Benson Huerta MD Assigned Cancer Care Provider   12/16/23     Phone: [phone] Fax: [phone]         [address] [place] [address]                DIAGNOSIS:  Cancer Staging   No matching staging information was found for the patient.          RADIATION THERAPY:    Nicolas Malone has received 3600 cGy to date to Rectum and pelvic LNs.   Treatment 18/25  Boost 0/3  Total planned dose: 5540 cGy      SUBJECTIVE:    Currently he finds treatment to be going well. Feels he may be having more frequent, urgent, and looser bowel movements and is taking Imodium as needed and it is effective. He also notes an increase in gas and abdominal cramping.  Using barrier cream to rectal area and this helping rectal tenderness.  Occasional right flank pain.  Decreased appetite.  Denies N/V.      OBJECTIVE:    WEIGHT: 0 lbs 0 oz. Resp 16   Examination reveals an alert non ill appearing male patient, respirations non labored.        IMPRESSION:  Routine tolerance to radiation therapy.       PLAN:  Continue treatment as planned.     Excess gas  - Can start Gas X OTC     Diarrhea   - Imodium OTC as needed          Medical record and imaging reviewed by covering locum provider.      Flores Adams DNP, APRN, FNP-C     Clay Garcia MD
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
## 2024-01-24 ENCOUNTER — RESULTS ONLY (OUTPATIENT)
Dept: RADIATION ONCOLOGY | Facility: HOSPITAL | Age: 74
End: 2024-01-24

## 2024-01-24 ENCOUNTER — APPOINTMENT (OUTPATIENT)
Dept: RADIATION ONCOLOGY | Facility: HOSPITAL | Age: 74
End: 2024-01-24
Payer: MEDICARE

## 2024-01-24 LAB
RAD ONC ARIA COURSE ID: NORMAL
RAD ONC ARIA COURSE LAST TREATMENT DATE: NORMAL
RAD ONC ARIA COURSE START DATE: NORMAL
RAD ONC ARIA COURSE TREATMENT ELAPSED DAYS: 27
RAD ONC ARIA FIRST TREATMENT DATE: NORMAL
RAD ONC ARIA PLAN FRACTIONS TREATED TO DATE: 19
RAD ONC ARIA PLAN ID: NORMAL
RAD ONC ARIA PLAN PRESCRIBED DOSE PER FRACTION: 2 GY
RAD ONC ARIA PLAN TOTAL FRACTIONS PRESCRIBED: 25
RAD ONC ARIA PLAN TOTAL PRESCRIBED DOSE: 5000 CGY
RAD ONC ARIA REFERENCE POINT DOSAGE GIVEN TO DATE: NORMAL GY
RAD ONC ARIA REFERENCE POINT ID: NORMAL

## 2024-01-24 PROCEDURE — 77014 HC CT GUIDE FOR PLACEMENT RADIATION THERAPY FIELDS: CPT | Performed by: RADIOLOGY

## 2024-01-24 PROCEDURE — 77014 PR CT GUIDE FOR PLACEMENT RADIATION THERAPY FIELDS: CPT | Mod: 26 | Performed by: RADIOLOGY

## 2024-01-24 PROCEDURE — 77386 HC IMRT TREATMENT DELIVERY, COMPLEX: CPT | Performed by: RADIOLOGY

## 2024-01-25 ENCOUNTER — RESULTS ONLY (OUTPATIENT)
Dept: RADIATION ONCOLOGY | Facility: HOSPITAL | Age: 74
End: 2024-01-25

## 2024-01-25 ENCOUNTER — APPOINTMENT (OUTPATIENT)
Dept: RADIATION ONCOLOGY | Facility: HOSPITAL | Age: 74
End: 2024-01-25
Payer: MEDICARE

## 2024-01-25 ENCOUNTER — LAB (OUTPATIENT)
Dept: LAB | Facility: OTHER | Age: 74
End: 2024-01-25
Payer: MEDICARE

## 2024-01-25 ENCOUNTER — DOCUMENTATION ONLY (OUTPATIENT)
Dept: ONCOLOGY | Facility: OTHER | Age: 74
End: 2024-01-25

## 2024-01-25 DIAGNOSIS — C20 RECTAL CANCER (H): ICD-10-CM

## 2024-01-25 LAB
ALBUMIN SERPL BCG-MCNC: 4.4 G/DL (ref 3.5–5.2)
ALP SERPL-CCNC: 71 U/L (ref 40–150)
ALT SERPL W P-5'-P-CCNC: 17 U/L (ref 0–70)
ANION GAP SERPL CALCULATED.3IONS-SCNC: 9 MMOL/L (ref 7–15)
AST SERPL W P-5'-P-CCNC: 25 U/L (ref 0–45)
BASOPHILS # BLD AUTO: 0 10E3/UL (ref 0–0.2)
BASOPHILS NFR BLD AUTO: 1 %
BILIRUB SERPL-MCNC: 0.6 MG/DL
BUN SERPL-MCNC: 21.8 MG/DL (ref 8–23)
CALCIUM SERPL-MCNC: 9.4 MG/DL (ref 8.8–10.2)
CHLORIDE SERPL-SCNC: 98 MMOL/L (ref 98–107)
CREAT SERPL-MCNC: 1.39 MG/DL (ref 0.67–1.17)
DEPRECATED HCO3 PLAS-SCNC: 30 MMOL/L (ref 22–29)
EGFRCR SERPLBLD CKD-EPI 2021: 54 ML/MIN/1.73M2
EOSINOPHIL # BLD AUTO: 0.4 10E3/UL (ref 0–0.7)
EOSINOPHIL NFR BLD AUTO: 7 %
ERYTHROCYTE [DISTWIDTH] IN BLOOD BY AUTOMATED COUNT: 13.8 % (ref 10–15)
GLUCOSE SERPL-MCNC: 164 MG/DL (ref 70–99)
HCT VFR BLD AUTO: 42.3 % (ref 40–53)
HGB BLD-MCNC: 14.9 G/DL (ref 13.3–17.7)
IMM GRANULOCYTES # BLD: 0 10E3/UL
IMM GRANULOCYTES NFR BLD: 1 %
LYMPHOCYTES # BLD AUTO: 0.5 10E3/UL (ref 0.8–5.3)
LYMPHOCYTES NFR BLD AUTO: 9 %
MCH RBC QN AUTO: 31.1 PG (ref 26.5–33)
MCHC RBC AUTO-ENTMCNC: 35.2 G/DL (ref 31.5–36.5)
MCV RBC AUTO: 88 FL (ref 78–100)
MONOCYTES # BLD AUTO: 0.5 10E3/UL (ref 0–1.3)
MONOCYTES NFR BLD AUTO: 10 %
NEUTROPHILS # BLD AUTO: 4 10E3/UL (ref 1.6–8.3)
NEUTROPHILS NFR BLD AUTO: 72 %
NRBC # BLD AUTO: 0 10E3/UL
NRBC BLD AUTO-RTO: 0 /100
PLATELET # BLD AUTO: 132 10E3/UL (ref 150–450)
POTASSIUM SERPL-SCNC: 3.4 MMOL/L (ref 3.4–5.3)
PROT SERPL-MCNC: 6.7 G/DL (ref 6.4–8.3)
RAD ONC ARIA COURSE ID: NORMAL
RAD ONC ARIA COURSE LAST TREATMENT DATE: NORMAL
RAD ONC ARIA COURSE START DATE: NORMAL
RAD ONC ARIA COURSE TREATMENT ELAPSED DAYS: 28
RAD ONC ARIA FIRST TREATMENT DATE: NORMAL
RAD ONC ARIA PLAN FRACTIONS TREATED TO DATE: 20
RAD ONC ARIA PLAN ID: NORMAL
RAD ONC ARIA PLAN PRESCRIBED DOSE PER FRACTION: 2 GY
RAD ONC ARIA PLAN TOTAL FRACTIONS PRESCRIBED: 25
RAD ONC ARIA PLAN TOTAL PRESCRIBED DOSE: 5000 CGY
RAD ONC ARIA REFERENCE POINT DOSAGE GIVEN TO DATE: NORMAL GY
RAD ONC ARIA REFERENCE POINT ID: NORMAL
RBC # BLD AUTO: 4.79 10E6/UL (ref 4.4–5.9)
SODIUM SERPL-SCNC: 137 MMOL/L (ref 135–145)
WBC # BLD AUTO: 5.5 10E3/UL (ref 4–11)

## 2024-01-25 PROCEDURE — 36415 COLL VENOUS BLD VENIPUNCTURE: CPT | Mod: ZL

## 2024-01-25 PROCEDURE — 77386 HC IMRT TREATMENT DELIVERY, COMPLEX: CPT | Performed by: RADIOLOGY

## 2024-01-25 PROCEDURE — 77427 RADIATION TX MANAGEMENT X5: CPT | Performed by: RADIOLOGY

## 2024-01-25 PROCEDURE — 80053 COMPREHEN METABOLIC PANEL: CPT | Mod: ZL

## 2024-01-25 PROCEDURE — 77336 RADIATION PHYSICS CONSULT: CPT | Performed by: RADIOLOGY

## 2024-01-25 PROCEDURE — 85025 COMPLETE CBC W/AUTO DIFF WBC: CPT | Mod: ZL

## 2024-01-25 PROCEDURE — 77014 PR CT GUIDE FOR PLACEMENT RADIATION THERAPY FIELDS: CPT | Mod: 26 | Performed by: RADIOLOGY

## 2024-01-25 PROCEDURE — 82378 CARCINOEMBRYONIC ANTIGEN: CPT | Mod: ZL

## 2024-01-25 NOTE — PROGRESS NOTES
Oral Chemotherapy Program  Lab review     Reviewed labs from 1/25/24.      Labs are unremarkable and do not require any dose adjustments at this time     Follow-up/plan  2/8/24: Labs     Kacey Roche PharmD, MPH, BCPS  January 25, 2024

## 2024-01-26 ENCOUNTER — RESULTS ONLY (OUTPATIENT)
Dept: RADIATION ONCOLOGY | Facility: HOSPITAL | Age: 74
End: 2024-01-26

## 2024-01-26 ENCOUNTER — APPOINTMENT (OUTPATIENT)
Dept: RADIATION ONCOLOGY | Facility: HOSPITAL | Age: 74
End: 2024-01-26
Payer: MEDICARE

## 2024-01-26 LAB
CEA SERPL-MCNC: 5.3 NG/ML
RAD ONC ARIA COURSE ID: NORMAL
RAD ONC ARIA COURSE LAST TREATMENT DATE: NORMAL
RAD ONC ARIA COURSE START DATE: NORMAL
RAD ONC ARIA COURSE TREATMENT ELAPSED DAYS: 29
RAD ONC ARIA FIRST TREATMENT DATE: NORMAL
RAD ONC ARIA PLAN FRACTIONS TREATED TO DATE: 21
RAD ONC ARIA PLAN ID: NORMAL
RAD ONC ARIA PLAN PRESCRIBED DOSE PER FRACTION: 2 GY
RAD ONC ARIA PLAN TOTAL FRACTIONS PRESCRIBED: 25
RAD ONC ARIA PLAN TOTAL PRESCRIBED DOSE: 5000 CGY
RAD ONC ARIA REFERENCE POINT DOSAGE GIVEN TO DATE: NORMAL GY
RAD ONC ARIA REFERENCE POINT ID: NORMAL

## 2024-01-26 PROCEDURE — 77014 HC CT GUIDE FOR PLACEMENT RADIATION THERAPY FIELDS: CPT | Performed by: RADIOLOGY

## 2024-01-26 PROCEDURE — 77386 HC IMRT TREATMENT DELIVERY, COMPLEX: CPT | Performed by: RADIOLOGY

## 2024-01-26 PROCEDURE — 77014 PR CT GUIDE FOR PLACEMENT RADIATION THERAPY FIELDS: CPT | Mod: 26 | Performed by: RADIOLOGY

## 2024-01-29 ENCOUNTER — APPOINTMENT (OUTPATIENT)
Dept: RADIATION ONCOLOGY | Facility: HOSPITAL | Age: 74
End: 2024-01-29
Payer: MEDICARE

## 2024-01-29 ENCOUNTER — RESULTS ONLY (OUTPATIENT)
Dept: RADIATION ONCOLOGY | Facility: HOSPITAL | Age: 74
End: 2024-01-29

## 2024-01-29 LAB
RAD ONC ARIA COURSE ID: NORMAL
RAD ONC ARIA COURSE LAST TREATMENT DATE: NORMAL
RAD ONC ARIA COURSE START DATE: NORMAL
RAD ONC ARIA COURSE TREATMENT ELAPSED DAYS: 32
RAD ONC ARIA FIRST TREATMENT DATE: NORMAL
RAD ONC ARIA PLAN FRACTIONS TREATED TO DATE: 22
RAD ONC ARIA PLAN ID: NORMAL
RAD ONC ARIA PLAN PRESCRIBED DOSE PER FRACTION: 2 GY
RAD ONC ARIA PLAN TOTAL FRACTIONS PRESCRIBED: 25
RAD ONC ARIA PLAN TOTAL PRESCRIBED DOSE: 5000 CGY
RAD ONC ARIA REFERENCE POINT DOSAGE GIVEN TO DATE: NORMAL GY
RAD ONC ARIA REFERENCE POINT ID: NORMAL

## 2024-01-29 PROCEDURE — 77014 PR CT GUIDE FOR PLACEMENT RADIATION THERAPY FIELDS: CPT | Mod: 26 | Performed by: RADIOLOGY

## 2024-01-29 PROCEDURE — 77386 HC IMRT TREATMENT DELIVERY, COMPLEX: CPT | Performed by: RADIOLOGY

## 2024-01-29 PROCEDURE — 77014 HC CT GUIDE FOR PLACEMENT RADIATION THERAPY FIELDS: CPT | Performed by: RADIOLOGY

## 2024-01-30 ENCOUNTER — RESULTS ONLY (OUTPATIENT)
Dept: RADIATION ONCOLOGY | Facility: HOSPITAL | Age: 74
End: 2024-01-30

## 2024-01-30 ENCOUNTER — OFFICE VISIT (OUTPATIENT)
Dept: RADIATION ONCOLOGY | Facility: HOSPITAL | Age: 74
End: 2024-01-30
Payer: MEDICARE

## 2024-01-30 VITALS
BODY MASS INDEX: 33.56 KG/M2 | OXYGEN SATURATION: 98 % | TEMPERATURE: 97.3 F | RESPIRATION RATE: 18 BRPM | SYSTOLIC BLOOD PRESSURE: 158 MMHG | DIASTOLIC BLOOD PRESSURE: 78 MMHG | WEIGHT: 214.3 LBS | HEART RATE: 56 BPM

## 2024-01-30 DIAGNOSIS — C20 RECTAL CANCER (H): Primary | ICD-10-CM

## 2024-01-30 LAB
RAD ONC ARIA COURSE ID: NORMAL
RAD ONC ARIA COURSE LAST TREATMENT DATE: NORMAL
RAD ONC ARIA COURSE START DATE: NORMAL
RAD ONC ARIA COURSE TREATMENT ELAPSED DAYS: 33
RAD ONC ARIA FIRST TREATMENT DATE: NORMAL
RAD ONC ARIA PLAN FRACTIONS TREATED TO DATE: 23
RAD ONC ARIA PLAN ID: NORMAL
RAD ONC ARIA PLAN PRESCRIBED DOSE PER FRACTION: 2 GY
RAD ONC ARIA PLAN TOTAL FRACTIONS PRESCRIBED: 25
RAD ONC ARIA PLAN TOTAL PRESCRIBED DOSE: 5000 CGY
RAD ONC ARIA REFERENCE POINT DOSAGE GIVEN TO DATE: NORMAL GY
RAD ONC ARIA REFERENCE POINT ID: NORMAL

## 2024-01-30 ASSESSMENT — PAIN SCALES - GENERAL: PAINLEVEL: NO PAIN (0)

## 2024-01-30 NOTE — PROGRESS NOTES
Progress Notes  Encounter Date: Jan 30, 2024  RADIATION ONCOLOGY WEEKLY MANAGEMENT PROGRESS NOTE     Patient Care Team         Relationship Specialty Notifications Start End    No Ref-Primary, Physician PCP - General   4/25/23     Fax: [phone]         Greg Waddell MD Assigned Surgical Provider   11/25/23     Phone: [phone] Fax: [phone]         [address] [address]    Tommy Rodriguez MD Surgeon Surgery  12/6/23     Phone: [phone] Fax: [phone]         [address] [place] [address]    Angélica Estrada MD MD Hematology & Oncology  12/6/23     Phone: [phone] Fax: [phone]         [address] [address]    Benson Huerta MD Assigned Cancer Care Provider   12/16/23     Phone: [phone] Fax: [phone]         [address] [place] [address]                DIAGNOSIS:  Cancer Staging   No matching staging information was found for the patient.          RADIATION THERAPY:    Nicolas Malone has received 4600 cGy to date to Rectum and pelvic LNs.   Treatment 23/25  Boost 0/3  Total planned dose: 5540 cGy      SUBJECTIVE:    Currently he finds treatment to be going well. Urgent feeling of needing to have bowel movement but only gas is expelled. Loose stools has improved and has not needed Imodium recently. Has Imodium if needed and it is effective when he does take it. Gas and abdominal cramping has improved and has not tried Gas X as he feels he has not gotten to the point he needs intervention. Using barrier cream rarely to rectal area and does help with rectal tenderness when used.   Appetite slightly better than last week.  Denies N/V. Fatigue stable.    OBJECTIVE:    WEIGHT: 214 lbs 4.8 oz. BP (!) 158/78 (BP Location: Left arm, Patient Position: Chair, Cuff Size: Adult Regular)   Pulse 56   Temp 97.3  F (36.3  C) (Tympanic)   Resp 18   Wt 97.2 kg (214 lb 4.8 oz)   SpO2 98%   BMI 33.56 kg/m    Examination reveals an alert non ill appearing  male patient, respirations non labored.        IMPRESSION:  Routine tolerance to radiation therapy.       PLAN:  Continue treatment as planned.     Excess gas  - Can start Gas X OTC     Diarrhea   - Imodium OTC as needed          Medical record and imaging reviewed by covering locum provider.      Flores Adams DNP, APRN, FNP-C     Clay Garcia MD

## 2024-01-31 ENCOUNTER — APPOINTMENT (OUTPATIENT)
Dept: RADIATION ONCOLOGY | Facility: HOSPITAL | Age: 74
End: 2024-01-31
Payer: MEDICARE

## 2024-01-31 ENCOUNTER — RESULTS ONLY (OUTPATIENT)
Dept: RADIATION ONCOLOGY | Facility: HOSPITAL | Age: 74
End: 2024-01-31

## 2024-01-31 LAB
RAD ONC ARIA COURSE ID: NORMAL
RAD ONC ARIA COURSE LAST TREATMENT DATE: NORMAL
RAD ONC ARIA COURSE START DATE: NORMAL
RAD ONC ARIA COURSE TREATMENT ELAPSED DAYS: 34
RAD ONC ARIA FIRST TREATMENT DATE: NORMAL
RAD ONC ARIA PLAN FRACTIONS TREATED TO DATE: 24
RAD ONC ARIA PLAN ID: NORMAL
RAD ONC ARIA PLAN PRESCRIBED DOSE PER FRACTION: 2 GY
RAD ONC ARIA PLAN TOTAL FRACTIONS PRESCRIBED: 25
RAD ONC ARIA PLAN TOTAL PRESCRIBED DOSE: 5000 CGY
RAD ONC ARIA REFERENCE POINT DOSAGE GIVEN TO DATE: NORMAL GY
RAD ONC ARIA REFERENCE POINT ID: NORMAL

## 2024-01-31 PROCEDURE — 77014 PR CT GUIDE FOR PLACEMENT RADIATION THERAPY FIELDS: CPT | Mod: 26 | Performed by: RADIOLOGY

## 2024-01-31 PROCEDURE — 77014 HC CT GUIDE FOR PLACEMENT RADIATION THERAPY FIELDS: CPT | Performed by: RADIOLOGY

## 2024-01-31 PROCEDURE — 77427 RADIATION TX MANAGEMENT X5: CPT | Performed by: RADIOLOGY

## 2024-01-31 PROCEDURE — 77386 HC IMRT TREATMENT DELIVERY, COMPLEX: CPT | Performed by: RADIOLOGY

## 2024-02-01 ENCOUNTER — APPOINTMENT (OUTPATIENT)
Dept: RADIATION ONCOLOGY | Facility: HOSPITAL | Age: 74
End: 2024-02-01
Payer: MEDICARE

## 2024-02-01 ENCOUNTER — RESULTS ONLY (OUTPATIENT)
Dept: RADIATION ONCOLOGY | Facility: HOSPITAL | Age: 74
End: 2024-02-01

## 2024-02-01 LAB
RAD ONC ARIA COURSE ID: NORMAL
RAD ONC ARIA COURSE LAST TREATMENT DATE: NORMAL
RAD ONC ARIA COURSE START DATE: NORMAL
RAD ONC ARIA COURSE TREATMENT ELAPSED DAYS: 35
RAD ONC ARIA FIRST TREATMENT DATE: NORMAL
RAD ONC ARIA PLAN FRACTIONS TREATED TO DATE: 25
RAD ONC ARIA PLAN ID: NORMAL
RAD ONC ARIA PLAN PRESCRIBED DOSE PER FRACTION: 2 GY
RAD ONC ARIA PLAN TOTAL FRACTIONS PRESCRIBED: 25
RAD ONC ARIA PLAN TOTAL PRESCRIBED DOSE: 5000 CGY
RAD ONC ARIA REFERENCE POINT DOSAGE GIVEN TO DATE: NORMAL GY
RAD ONC ARIA REFERENCE POINT ID: NORMAL

## 2024-02-01 PROCEDURE — 77014 HC CT GUIDE FOR PLACEMENT RADIATION THERAPY FIELDS: CPT | Performed by: RADIOLOGY

## 2024-02-01 PROCEDURE — 77386 HC IMRT TREATMENT DELIVERY, COMPLEX: CPT | Performed by: RADIOLOGY

## 2024-02-01 PROCEDURE — 77336 RADIATION PHYSICS CONSULT: CPT | Performed by: RADIOLOGY

## 2024-02-01 PROCEDURE — 77014 PR CT GUIDE FOR PLACEMENT RADIATION THERAPY FIELDS: CPT | Mod: 26 | Performed by: RADIOLOGY

## 2024-02-02 ENCOUNTER — RESULTS ONLY (OUTPATIENT)
Dept: RADIATION ONCOLOGY | Facility: HOSPITAL | Age: 74
End: 2024-02-02

## 2024-02-02 ENCOUNTER — APPOINTMENT (OUTPATIENT)
Dept: RADIATION ONCOLOGY | Facility: HOSPITAL | Age: 74
End: 2024-02-02
Payer: MEDICARE

## 2024-02-02 LAB
RAD ONC ARIA COURSE ID: NORMAL
RAD ONC ARIA COURSE LAST TREATMENT DATE: NORMAL
RAD ONC ARIA COURSE START DATE: NORMAL
RAD ONC ARIA COURSE TREATMENT ELAPSED DAYS: 36
RAD ONC ARIA FIRST TREATMENT DATE: NORMAL
RAD ONC ARIA PLAN FRACTIONS TREATED TO DATE: 1
RAD ONC ARIA PLAN ID: NORMAL
RAD ONC ARIA PLAN PRESCRIBED DOSE PER FRACTION: 1.8 GY
RAD ONC ARIA PLAN TOTAL FRACTIONS PRESCRIBED: 3
RAD ONC ARIA PLAN TOTAL PRESCRIBED DOSE: 540 CGY
RAD ONC ARIA REFERENCE POINT DOSAGE GIVEN TO DATE: NORMAL GY
RAD ONC ARIA REFERENCE POINT ID: NORMAL

## 2024-02-02 PROCEDURE — 77412 RADIATION TX DELIVERY LVL 3: CPT | Performed by: RADIOLOGY

## 2024-02-02 PROCEDURE — 77014 PR CT GUIDE FOR PLACEMENT RADIATION THERAPY FIELDS: CPT | Mod: 26 | Performed by: RADIOLOGY

## 2024-02-02 PROCEDURE — 77014 HC CT GUIDE FOR PLACEMENT RADIATION THERAPY FIELDS: CPT | Performed by: RADIOLOGY

## 2024-02-05 ENCOUNTER — APPOINTMENT (OUTPATIENT)
Dept: RADIATION ONCOLOGY | Facility: HOSPITAL | Age: 74
End: 2024-02-05
Payer: MEDICARE

## 2024-02-05 ENCOUNTER — RESULTS ONLY (OUTPATIENT)
Dept: RADIATION ONCOLOGY | Facility: HOSPITAL | Age: 74
End: 2024-02-05

## 2024-02-05 LAB
RAD ONC ARIA COURSE ID: NORMAL
RAD ONC ARIA COURSE LAST TREATMENT DATE: NORMAL
RAD ONC ARIA COURSE START DATE: NORMAL
RAD ONC ARIA COURSE TREATMENT ELAPSED DAYS: 39
RAD ONC ARIA FIRST TREATMENT DATE: NORMAL
RAD ONC ARIA PLAN FRACTIONS TREATED TO DATE: 2
RAD ONC ARIA PLAN ID: NORMAL
RAD ONC ARIA PLAN PRESCRIBED DOSE PER FRACTION: 1.8 GY
RAD ONC ARIA PLAN TOTAL FRACTIONS PRESCRIBED: 3
RAD ONC ARIA PLAN TOTAL PRESCRIBED DOSE: 540 CGY
RAD ONC ARIA REFERENCE POINT DOSAGE GIVEN TO DATE: NORMAL GY
RAD ONC ARIA REFERENCE POINT ID: NORMAL

## 2024-02-05 PROCEDURE — 77014 HC CT GUIDE FOR PLACEMENT RADIATION THERAPY FIELDS: CPT | Performed by: RADIOLOGY

## 2024-02-05 PROCEDURE — 77014 PR CT GUIDE FOR PLACEMENT RADIATION THERAPY FIELDS: CPT | Mod: 26 | Performed by: RADIOLOGY

## 2024-02-05 PROCEDURE — 77412 RADIATION TX DELIVERY LVL 3: CPT | Performed by: RADIOLOGY

## 2024-02-06 ENCOUNTER — OFFICE VISIT (OUTPATIENT)
Dept: RADIATION ONCOLOGY | Facility: HOSPITAL | Age: 74
End: 2024-02-06
Payer: MEDICARE

## 2024-02-06 ENCOUNTER — RESULTS ONLY (OUTPATIENT)
Dept: RADIATION ONCOLOGY | Facility: HOSPITAL | Age: 74
End: 2024-02-06

## 2024-02-06 ENCOUNTER — APPOINTMENT (OUTPATIENT)
Dept: LAB | Facility: OTHER | Age: 74
End: 2024-02-06
Payer: MEDICARE

## 2024-02-06 VITALS
WEIGHT: 214.4 LBS | SYSTOLIC BLOOD PRESSURE: 142 MMHG | DIASTOLIC BLOOD PRESSURE: 80 MMHG | BODY MASS INDEX: 33.58 KG/M2 | OXYGEN SATURATION: 97 % | TEMPERATURE: 97.6 F | RESPIRATION RATE: 16 BRPM | HEART RATE: 60 BPM

## 2024-02-06 DIAGNOSIS — C20 RECTAL CANCER (H): Primary | ICD-10-CM

## 2024-02-06 DIAGNOSIS — C20 MALIGNANT NEOPLASM OF RECTUM (H): ICD-10-CM

## 2024-02-06 LAB
ALBUMIN SERPL BCG-MCNC: 4.1 G/DL (ref 3.5–5.2)
ALP SERPL-CCNC: 77 U/L (ref 40–150)
ALT SERPL W P-5'-P-CCNC: 17 U/L (ref 0–70)
ANION GAP SERPL CALCULATED.3IONS-SCNC: 12 MMOL/L (ref 7–15)
AST SERPL W P-5'-P-CCNC: 26 U/L (ref 0–45)
BASOPHILS # BLD AUTO: 0 10E3/UL (ref 0–0.2)
BASOPHILS NFR BLD AUTO: 1 %
BILIRUB SERPL-MCNC: 0.4 MG/DL
BUN SERPL-MCNC: 19.8 MG/DL (ref 8–23)
CALCIUM SERPL-MCNC: 9.1 MG/DL (ref 8.8–10.2)
CHLORIDE SERPL-SCNC: 101 MMOL/L (ref 98–107)
CREAT SERPL-MCNC: 1.19 MG/DL (ref 0.67–1.17)
DEPRECATED HCO3 PLAS-SCNC: 26 MMOL/L (ref 22–29)
EGFRCR SERPLBLD CKD-EPI 2021: 65 ML/MIN/1.73M2
EOSINOPHIL # BLD AUTO: 0.3 10E3/UL (ref 0–0.7)
EOSINOPHIL NFR BLD AUTO: 7 %
ERYTHROCYTE [DISTWIDTH] IN BLOOD BY AUTOMATED COUNT: 15.3 % (ref 10–15)
GLUCOSE SERPL-MCNC: 164 MG/DL (ref 70–99)
HCT VFR BLD AUTO: 41 % (ref 40–53)
HGB BLD-MCNC: 14.6 G/DL (ref 13.3–17.7)
IMM GRANULOCYTES # BLD: 0 10E3/UL
IMM GRANULOCYTES NFR BLD: 1 %
LYMPHOCYTES # BLD AUTO: 0.4 10E3/UL (ref 0.8–5.3)
LYMPHOCYTES NFR BLD AUTO: 8 %
MCH RBC QN AUTO: 31.9 PG (ref 26.5–33)
MCHC RBC AUTO-ENTMCNC: 35.6 G/DL (ref 31.5–36.5)
MCV RBC AUTO: 90 FL (ref 78–100)
MONOCYTES # BLD AUTO: 0.5 10E3/UL (ref 0–1.3)
MONOCYTES NFR BLD AUTO: 11 %
NEUTROPHILS # BLD AUTO: 3.2 10E3/UL (ref 1.6–8.3)
NEUTROPHILS NFR BLD AUTO: 72 %
NRBC # BLD AUTO: 0 10E3/UL
NRBC BLD AUTO-RTO: 0 /100
PLATELET # BLD AUTO: 167 10E3/UL (ref 150–450)
POTASSIUM SERPL-SCNC: 3.6 MMOL/L (ref 3.4–5.3)
PROT SERPL-MCNC: 6.3 G/DL (ref 6.4–8.3)
RAD ONC ARIA COURSE ID: NORMAL
RAD ONC ARIA COURSE LAST TREATMENT DATE: NORMAL
RAD ONC ARIA COURSE START DATE: NORMAL
RAD ONC ARIA COURSE TREATMENT ELAPSED DAYS: 40
RAD ONC ARIA FIRST TREATMENT DATE: NORMAL
RAD ONC ARIA PLAN FRACTIONS TREATED TO DATE: 3
RAD ONC ARIA PLAN ID: NORMAL
RAD ONC ARIA PLAN PRESCRIBED DOSE PER FRACTION: 1.8 GY
RAD ONC ARIA PLAN TOTAL FRACTIONS PRESCRIBED: 3
RAD ONC ARIA PLAN TOTAL PRESCRIBED DOSE: 540 CGY
RAD ONC ARIA REFERENCE POINT DOSAGE GIVEN TO DATE: NORMAL GY
RAD ONC ARIA REFERENCE POINT ID: NORMAL
RBC # BLD AUTO: 4.58 10E6/UL (ref 4.4–5.9)
SODIUM SERPL-SCNC: 139 MMOL/L (ref 135–145)
WBC # BLD AUTO: 4.4 10E3/UL (ref 4–11)

## 2024-02-06 PROCEDURE — 80053 COMPREHEN METABOLIC PANEL: CPT | Mod: ZL

## 2024-02-06 PROCEDURE — 85025 COMPLETE CBC W/AUTO DIFF WBC: CPT | Mod: ZL

## 2024-02-06 PROCEDURE — 36415 COLL VENOUS BLD VENIPUNCTURE: CPT | Mod: ZL

## 2024-02-06 ASSESSMENT — PAIN SCALES - GENERAL: PAINLEVEL: NO PAIN (0)

## 2024-02-06 NOTE — PROGRESS NOTES
"Progress Notes  Encounter Date: Feb 6, 2024  RADIATION ONCOLOGY WEEKLY MANAGEMENT PROGRESS NOTE     Patient Care Team         Relationship Specialty Notifications Start End    No Ref-Primary, Physician PCP - General   4/25/23     Fax: [phone]         Greg Waddell MD Assigned Surgical Provider   11/25/23     Phone: [phone] Fax: [phone]         [address] [address]    Tommy Rodriguez MD Surgeon Surgery  12/6/23     Phone: [phone] Fax: [phone]         [address] [place] [address]    Angélica Estrada MD MD Hematology & Oncology  12/6/23     Phone: [phone] Fax: [phone]         [address] [address]    Benson Huerta MD Assigned Cancer Care Provider   12/16/23     Phone: [phone] Fax: [phone]         [address] [place] [address]                DIAGNOSIS:  Cancer Staging   No matching staging information was found for the patient.          RADIATION THERAPY:    Nicolas Malone has received 5040 cGy to date to Rectum and pelvic LNs.   Treatment 25/25  Boost 3/3  Total planned dose: 5540 cGy      SUBJECTIVE:    Currently he reports continued urgent feeling of needing to have bowel movement but only gas is expelled. Loose stools continue and has Imodium if needed, this is effective when he does take it. Gas and abdominal cramping has improved and has tried one Gas X tablet which he is unsure if this was helpful. Reports he was experiencing urinary frequency yesterday, feels he is emptying bladder, \"minor burning\" during urination, and more of a \"dribble stream\". Using barrier cream rarely to rectal area and does help with rectal tenderness when used.  Appetite fair. Denies N/V. Fatigue stable.     OBJECTIVE:    WEIGHT: 214 lbs 6.4 oz. BP (!) 142/80 (BP Location: Right arm, Patient Position: Chair, Cuff Size: Adult Regular)   Pulse 60   Temp 97.6  F (36.4  C) (Tympanic)   Resp 16   Wt 97.3 kg (214 lb 6.4 oz)   SpO2 97%   BMI 33.58 " kg/m    Examination reveals an alert non ill appearing male patient, respirations non labored.        IMPRESSION:  Routine tolerance to radiation therapy.       PLAN:  Completion of radiation therapy today. Follow up with medical oncology. Patient plannig for chemotherapy after completion of XBRT     Excess gas  - Can start Gas X OTC     Diarrhea   - Imodium OTC as needed       Urinary frequency/ bladder irritation   - Likely related to radiation therapy   - Start Azo OTC  - Follow up with new or worsening symptoms            Medical record and imaging reviewed by covering locum provider.      Flores Adams DNP, APRN, FNP-C   Devang Escalona MD

## 2024-02-06 NOTE — PROGRESS NOTES
Nicolas Malone  Gender: male  : 1950  Medical Record: [id number]  Primary Care Provider: No Ref-Primary, Physician    REFERRING PHYSICIANS: Dr. Estrada    DIAGNOSIS: Rectal cancer (H)    TREATMENT INTENT: Curative   AREA TREATED: Rectum and pelvic LNs  PRIMARY TREATMENT TECHNIQUE: VMAT  ENERGY: 10X  TUMOR DOSE: 5000 cGy  NUMBER OF TREATMENTS: 25    BOOST AREA TREATED: Rectum boost   BOOST TREATMENT TECHNIQUE: 3d conformal   BOOST ENERGY: 10X  BOOST TUMOR DOSE: 540 cGy  BOOST NUMBER OF TREATMENTS: 3    TOTAL NUMBER OF TREATMENTS: 28  TOTAL DOSE: 5540 cGy  ELAPSED CALENDAR DAYS: 39  COMPLETION DATE: 2024       Flores Adams DNP, APRN, FNP-C   Department of Radiation Oncology

## 2024-02-12 ENCOUNTER — HOSPITAL ENCOUNTER (EMERGENCY)
Facility: HOSPITAL | Age: 74
End: 2024-02-12
Payer: MEDICARE

## 2024-02-12 ENCOUNTER — HOSPITAL ENCOUNTER (OUTPATIENT)
Dept: CT IMAGING | Facility: HOSPITAL | Age: 74
Discharge: HOME | End: 2024-02-12
Attending: INTERNAL MEDICINE | Admitting: INTERNAL MEDICINE
Payer: MEDICARE

## 2024-02-12 DIAGNOSIS — C20 RECTAL CANCER (H): ICD-10-CM

## 2024-02-12 PROCEDURE — 74177 CT ABD & PELVIS W/CONTRAST: CPT | Mod: MG

## 2024-02-12 PROCEDURE — 250N000011 HC RX IP 250 OP 636: Performed by: RADIOLOGY

## 2024-02-12 RX ORDER — IOPAMIDOL 755 MG/ML
105 INJECTION, SOLUTION INTRAVASCULAR ONCE
Status: COMPLETED | OUTPATIENT
Start: 2024-02-12 | End: 2024-02-12

## 2024-02-12 RX ADMIN — IOPAMIDOL 105 ML: 755 INJECTION, SOLUTION INTRAVENOUS at 10:02

## 2024-02-14 ENCOUNTER — ONCOLOGY VISIT (OUTPATIENT)
Dept: ONCOLOGY | Facility: OTHER | Age: 74
End: 2024-02-14
Attending: INTERNAL MEDICINE
Payer: MEDICARE

## 2024-02-14 VITALS
BODY MASS INDEX: 34.15 KG/M2 | HEART RATE: 57 BPM | HEIGHT: 67 IN | WEIGHT: 217.59 LBS | SYSTOLIC BLOOD PRESSURE: 136 MMHG | TEMPERATURE: 97.4 F | DIASTOLIC BLOOD PRESSURE: 68 MMHG | OXYGEN SATURATION: 95 %

## 2024-02-14 DIAGNOSIS — C20 RECTAL CANCER (H): Primary | ICD-10-CM

## 2024-02-14 PROCEDURE — 99215 OFFICE O/P EST HI 40 MIN: CPT | Performed by: INTERNAL MEDICINE

## 2024-02-14 PROCEDURE — G0463 HOSPITAL OUTPT CLINIC VISIT: HCPCS

## 2024-02-14 RX ORDER — ALBUTEROL SULFATE 0.83 MG/ML
2.5 SOLUTION RESPIRATORY (INHALATION)
Status: CANCELLED | OUTPATIENT
Start: 2024-02-17

## 2024-02-14 RX ORDER — HEPARIN SODIUM (PORCINE) LOCK FLUSH IV SOLN 100 UNIT/ML 100 UNIT/ML
5 SOLUTION INTRAVENOUS
Status: CANCELLED | OUTPATIENT
Start: 2024-02-17

## 2024-02-14 RX ORDER — METHYLPREDNISOLONE SODIUM SUCCINATE 125 MG/2ML
125 INJECTION, POWDER, LYOPHILIZED, FOR SOLUTION INTRAMUSCULAR; INTRAVENOUS
Status: CANCELLED
Start: 2024-02-17

## 2024-02-14 RX ORDER — MEPERIDINE HYDROCHLORIDE 25 MG/ML
25 INJECTION INTRAMUSCULAR; INTRAVENOUS; SUBCUTANEOUS EVERY 30 MIN PRN
Status: CANCELLED | OUTPATIENT
Start: 2024-02-17

## 2024-02-14 RX ORDER — EPINEPHRINE 1 MG/ML
0.3 INJECTION, SOLUTION, CONCENTRATE INTRAVENOUS EVERY 5 MIN PRN
Status: CANCELLED | OUTPATIENT
Start: 2024-02-17

## 2024-02-14 RX ORDER — ALBUTEROL SULFATE 90 UG/1
1-2 AEROSOL, METERED RESPIRATORY (INHALATION)
Status: CANCELLED
Start: 2024-02-17

## 2024-02-14 RX ORDER — HEPARIN SODIUM,PORCINE 10 UNIT/ML
5-20 VIAL (ML) INTRAVENOUS DAILY PRN
Status: CANCELLED | OUTPATIENT
Start: 2024-02-17

## 2024-02-14 RX ORDER — LORAZEPAM 2 MG/ML
0.5 INJECTION INTRAMUSCULAR EVERY 4 HOURS PRN
Status: CANCELLED | OUTPATIENT
Start: 2024-02-17

## 2024-02-14 RX ORDER — ONDANSETRON 2 MG/ML
8 INJECTION INTRAMUSCULAR; INTRAVENOUS ONCE
Status: CANCELLED | OUTPATIENT
Start: 2024-02-17

## 2024-02-14 RX ORDER — DIPHENHYDRAMINE HYDROCHLORIDE 50 MG/ML
50 INJECTION INTRAMUSCULAR; INTRAVENOUS
Status: CANCELLED
Start: 2024-02-17

## 2024-02-14 ASSESSMENT — PAIN SCALES - GENERAL: PAINLEVEL: NO PAIN (0)
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
## 2024-02-14 NOTE — NURSING NOTE
"Oncology Rooming Note    February 14, 2024 3:01 PM   Nicolas Malone is a 73 year old male who presents for:    Chief Complaint   Patient presents with    Oncology Clinic Visit     Follow up. Rectal cancer      Initial Vitals: /68 (BP Location: Right arm, Patient Position: Sitting, Cuff Size: Adult Large)   Pulse 57   Temp 97.4  F (36.3  C) (Tympanic)   Ht 1.702 m (5' 7\")   Wt 98.7 kg (217 lb 9.5 oz)   SpO2 95%   BMI 34.08 kg/m   Estimated body mass index is 34.08 kg/m  as calculated from the following:    Height as of this encounter: 1.702 m (5' 7\").    Weight as of this encounter: 98.7 kg (217 lb 9.5 oz). Body surface area is 2.16 meters squared.  No Pain (0) Comment: Data Unavailable   No LMP for male patient.  Allergies reviewed: Yes  Medications reviewed: Yes    Medications: Medication refills not needed today.  Pharmacy name entered into [facility]:    [id number] [place], [place] - [address]  AT [facility] [address]  [facility] - [place], [place] - [address]    Frailty Screening:   Is the patient here for a new oncology consult visit in cancer care? 2. No      Clinical concerns: none       Camila Allen LPN              "

## 2024-02-14 NOTE — Clinical Note
Order for capeox placed. 4 cycles total. Please schedule. No port for now. I would like to see him before cycle 4 otherwise lauren and shekhar can see him too. He can proceed to cycle 1 directly.

## 2024-02-15 ENCOUNTER — PATIENT OUTREACH (OUTPATIENT)
Dept: ONCOLOGY | Facility: OTHER | Age: 74
End: 2024-02-15

## 2024-02-15 NOTE — PROGRESS NOTES
[facility]: Cancer Care                                                                                        Patient called with some questions regarding his recent visit and scan. He was wondering if the lymph node near the esophagus had been noted in prior scans. Reviewed scan impression with patient and it was not noted in previous and appears to be a new area. He was also wondering about his   Unchanged mesenteric panniculitis.     Also discussed getting on for chemoeducation, discussed that the capecitabine and the oxalipalitin need to be given on the same day so hold off on taking it when it is received. Patient verbalizes understanding.       Signature:  Analisa Holguin RN

## 2024-02-16 ENCOUNTER — TELEPHONE (OUTPATIENT)
Dept: ONCOLOGY | Facility: OTHER | Age: 74
End: 2024-02-16

## 2024-02-16 ENCOUNTER — PATIENT OUTREACH (OUTPATIENT)
Dept: ONCOLOGY | Facility: OTHER | Age: 74
End: 2024-02-16

## 2024-02-16 DIAGNOSIS — C20 RECTAL CANCER (H): Primary | ICD-10-CM

## 2024-02-16 RX ORDER — CAPECITABINE 500 MG/1
1000 TABLET, FILM COATED ORAL 2 TIMES DAILY
Qty: 112 TABLET | Refills: 0 | Status: SHIPPED | OUTPATIENT
Start: 2024-02-17 | End: 2024-03-08

## 2024-02-16 NOTE — PROGRESS NOTES
[facility]: Cancer Care                                                                                        Called patient to inform him that we got auth for treatment. We will start him the Friday after his chemoeducation. Patient is aware to call if he does not get his capecitabine prior to this. Instructed to bring them with him to his infusion visit and after he has labs than he will be given the okay to start. Patient verbalizes understanding.     Signature:  Analisa Holguin RN

## 2024-02-16 NOTE — ORAL ONC MGMT
Oral Chemotherapy Monitoring Program    Primary Oncologist: Dr. Estrada  Primary Oncology Clinic: [facility]  Cancer Diagnosis: Rectal Cancer    Drug: Capecitabine (Xeloda) 2000 mg BID, days 1-14 of 21  Start Date: TBD, to start on same day as C1D1 oxaliplatin  Expected duration of therapy: 4 cycles    Drug Interaction Assessment: No clinically significant drug interactions    Lab Monitoring Plan  CBC, CMP q3 weeks w/ infusions    Subjective/Objective:  Nicolas Malone is a 73 year old male contacted by phone for an initial visit for oral chemotherapy education. Nicolas is not new to capecitabine and knows what to expect. He tolerated his first course of concurrent capecitabine/radiation relatively well with loose stools as the only bothersome side effect.         12/7/2023    10:00 AM 1/4/2024    12:00 PM 2/15/2024    10:00 AM 2/16/2024     9:00 AM   ORAL CHEMOTHERAPY   Assessment Type New Teach Initial Follow up Initial Work up New Teach   Diagnosis Code Rectal Cancer Rectal Cancer Rectal Cancer Rectal Cancer   Providers Sean Estrada   Clinic Name/Location [facility]   Drug Name Xeloda (capecitabine) Xeloda (capecitabine) Xeloda (capecitabine) Xeloda (capecitabine)   Dose 1,500 mg 1,500 mg 2,000 mg 2,000 mg   Current Schedule BID BID BID BID   Cycle Details M-F only during XRT M-F only during XRT 2 weeks on, 1 week off 2 weeks on, 1 week off   Start Date of Last Cycle  12/28/2023     Doses missed in last 2 weeks  1     Adherence Assessment  Adherent     Adverse Effects  No AE identified during assessment     Home BPs  Not applicable     Any new drug interactions?  No  No   Is the dose as ordered appropriate for the patient?  Yes  Yes   Is the patient currently in pain?  No     Has the patient been assessed within the past 6 months for depression?  Yes     Has the patient missed any days of school, work, or other routine activity?  No     Since the last time we talked, have you been  "hospitalized or used the emergency room?  No         Vitals:  BP:   BP Readings from Last 1 Encounters:   02/14/24 136/68     Wt Readings from Last 1 Encounters:   02/14/24 98.7 kg (217 lb 9.5 oz)     Estimated body surface area is 2.16 meters squared as calculated from the following:    Height as of 2/14/24: 1.702 m (5' 7\").    Weight as of 2/14/24: 98.7 kg (217 lb 9.5 oz).    Labs:  _  Result Component Current Result Ref Range   Sodium 139 (2/6/2024) 135 - 145 mmol/L     _  Result Component Current Result Ref Range   Potassium 3.6 (2/6/2024) 3.4 - 5.3 mmol/L     _  Result Component Current Result Ref Range   Calcium 9.1 (2/6/2024) 8.8 - 10.2 mg/dL     No results found for Mag within last 30 days.     No results found for Phos within last 30 days.     _  Result Component Current Result Ref Range   Albumin 4.1 (2/6/2024) 3.5 - 5.2 g/dL     _  Result Component Current Result Ref Range   Urea Nitrogen 19.8 (2/6/2024) 8.0 - 23.0 mg/dL     _  Result Component Current Result Ref Range   Creatinine 1.19 (H) (2/6/2024) 0.67 - 1.17 mg/dL       _  Result Component Current Result Ref Range   AST 26 (2/6/2024) 0 - 45 U/L     _  Result Component Current Result Ref Range   ALT 17 (2/6/2024) 0 - 70 U/L     _  Result Component Current Result Ref Range   Bilirubin Total 0.4 (2/6/2024) <=1.2 mg/dL       _  Result Component Current Result Ref Range   WBC Count 4.4 (2/6/2024) 4.0 - 11.0 10e3/uL     _  Result Component Current Result Ref Range   Hemoglobin 14.6 (2/6/2024) 13.3 - 17.7 g/dL     _  Result Component Current Result Ref Range   Platelet Count 167 (2/6/2024) 150 - 450 10e3/uL     No results found for ANC within last 30 days.       Assessment:  Patient is appropriate to start therapy; plan to start same day as first oxaliplatin infusion.    Capecitabine released to [facility].     Plan:  Basic chemotherapy teaching was reviewed with the patient including indication, start date of therapy, dose, administration, " adverse effects, missed doses, food and drug interactions, monitoring, side effect management, office contact information, and safe handling. Written materials were provided and all questions answered.    Follow-Up:  Review labs on C1D1 once scheduled     Anay GarvinD  Hematology/Oncology Clinical Pharmacist  [facility]  [phone]

## 2024-02-18 ENCOUNTER — HEALTH MAINTENANCE LETTER (OUTPATIENT)
Age: 74
End: 2024-02-18

## 2024-02-19 ENCOUNTER — DOCUMENTATION ONLY (OUTPATIENT)
Dept: ONCOLOGY | Facility: OTHER | Age: 74
End: 2024-02-19

## 2024-02-19 NOTE — PROGRESS NOTES
[facility] Chemotherapy Consult    The inpatient pharmacist has been consulted to review the patient's chart for  the following: any significant drug interactions between home medications, new take home medications, pre-medications, PRN medications, chemotherapy agents, and chemotherapy regimen.     Current Outpatient Medications   Medication Sig    capecitabine (XELODA) 500 MG tablet Take 4 tablets (2,000 mg) by mouth 2 times daily for 14 days Take on Days 1 through 14, then off for 7 days.    ibuprofen (ADVIL/MOTRIN) 200 MG tablet Take 200 mg by mouth daily (Patient not taking: Reported on 1/2/2024)    loperamide (IMODIUM A-D) 2 MG tablet Take 1 tablet (2 mg) by mouth 4 times daily as needed for diarrhea (Patient not taking: Reported on 1/2/2024)    sildenafil (VIAGRA) 100 MG tablet  (Patient not taking: Reported on 1/23/2024)    triamterene-HCTZ (DYAZIDE) 37.5-25 MG capsule Take 1 capsule by mouth every morning     No current facility-administered medications for this visit.             Cancer Being Treated: Rectal Cancer    Difference in Regimen Ordered vs. Standard Regimen: None     The following interactions were found and will require patient education and/or provider assessment:     Drug-Drug interactions:   Ibuprofen & dexamethasone can increase risk of GI bleeding  Oxaliplatin, ondansetron, prochlorperazine can increase QTc interval. No QTc interval/EKG available in chart; obtain baseline prior to treatment    Drug-Allergy interactions: None     Drug-Food interactions:   Avoid grapefruit juice as this interacts with Emend & sildenafil    Drug-Ethanol interactions:   Use with ibuprofen can increase risk of GI bleeding  Use with prochlorperazine can increase risk of CNS depression    Drug-Tobacco interactions: Former smokeless tobacco per chart review    Other:   Ondansetron, prochlorperazine, dexamethasone, loperamide are all Take-Home Medications as part of Treatment  Plan. However, these are not on home medication list. Ensure patient has these prior to treatment    If there are any additional questions or concerns, please contact the inpatient pharmacy ([phone]) for further review.     Britton Gamboa, [facility]  February 19, 2024

## 2024-02-20 ENCOUNTER — ONCOLOGY VISIT (OUTPATIENT)
Dept: ONCOLOGY | Facility: OTHER | Age: 74
End: 2024-02-20
Attending: NURSE PRACTITIONER
Payer: MEDICARE

## 2024-02-20 VITALS
BODY MASS INDEX: 33.98 KG/M2 | HEIGHT: 67 IN | HEART RATE: 61 BPM | TEMPERATURE: 98.4 F | WEIGHT: 216.49 LBS | DIASTOLIC BLOOD PRESSURE: 72 MMHG | SYSTOLIC BLOOD PRESSURE: 148 MMHG | OXYGEN SATURATION: 98 %

## 2024-02-20 DIAGNOSIS — C20 RECTAL CANCER (H): Primary | ICD-10-CM

## 2024-02-20 PROCEDURE — G0463 HOSPITAL OUTPT CLINIC VISIT: HCPCS

## 2024-02-20 PROCEDURE — 99417 PROLNG OP E/M EACH 15 MIN: CPT | Performed by: NURSE PRACTITIONER

## 2024-02-20 PROCEDURE — 99215 OFFICE O/P EST HI 40 MIN: CPT | Performed by: NURSE PRACTITIONER

## 2024-02-20 RX ORDER — LOPERAMIDE HCL 2 MG
CAPSULE ORAL
Qty: 30 CAPSULE | Refills: 0 | Status: SHIPPED | OUTPATIENT
Start: 2024-02-20

## 2024-02-20 RX ORDER — ONDANSETRON 8 MG/1
8 TABLET, FILM COATED ORAL EVERY 8 HOURS PRN
Qty: 30 TABLET | Refills: 2 | Status: SHIPPED | OUTPATIENT
Start: 2024-02-20

## 2024-02-20 RX ORDER — DEXAMETHASONE 4 MG/1
8 TABLET ORAL DAILY
Qty: 4 TABLET | Refills: 2 | Status: SHIPPED | OUTPATIENT
Start: 2024-02-20 | End: 2024-03-15

## 2024-02-20 RX ORDER — PROCHLORPERAZINE MALEATE 10 MG
10 TABLET ORAL EVERY 6 HOURS PRN
Qty: 30 TABLET | Refills: 2 | Status: SHIPPED | OUTPATIENT
Start: 2024-02-20

## 2024-02-20 ASSESSMENT — PAIN SCALES - GENERAL: PAINLEVEL: NO PAIN (0)

## 2024-02-20 NOTE — NURSING NOTE
"Oncology Rooming Note    February 20, 2024 1:03 PM   Nicolas Malone is a 73 year old male who presents for:    Chief Complaint   Patient presents with    Oncology Clinic Visit     Chemo Ed.      Initial Vitals: BP (!) 148/72 (BP Location: Right arm, Patient Position: Sitting, Cuff Size: Adult Large)   Pulse 61   Temp 98.4  F (36.9  C) (Tympanic)   Ht 1.702 m (5' 7\")   Wt 98.2 kg (216 lb 7.9 oz)   SpO2 98%   BMI 33.91 kg/m   Estimated body mass index is 33.91 kg/m  as calculated from the following:    Height as of this encounter: 1.702 m (5' 7\").    Weight as of this encounter: 98.2 kg (216 lb 7.9 oz). Body surface area is 2.15 meters squared.  No Pain (0) Comment: Data Unavailable   No LMP for male patient.  Allergies reviewed: Yes  Medications reviewed: Yes    Medications: Medication refills not needed today.  Pharmacy name entered into [facility]:    [id number] [place], [place] - [address]  [facility] [address]  [facility] - [place], [place] - [address]    Frailty Screening:   Is the patient here for a new oncology consult visit in cancer care? 2. No      Clinical concerns: none       Camila Allen LPN              "
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
## 2024-02-23 ENCOUNTER — INFUSION THERAPY VISIT (OUTPATIENT)
Dept: INFUSION THERAPY | Facility: OTHER | Age: 74
End: 2024-02-23
Attending: INTERNAL MEDICINE
Payer: MEDICARE

## 2024-02-23 ENCOUNTER — TELEPHONE (OUTPATIENT)
Dept: RADIATION ONCOLOGY | Facility: HOSPITAL | Age: 74
End: 2024-02-23
Payer: MEDICARE

## 2024-02-23 VITALS
WEIGHT: 218.48 LBS | HEIGHT: 68 IN | TEMPERATURE: 98.7 F | SYSTOLIC BLOOD PRESSURE: 136 MMHG | BODY MASS INDEX: 33.11 KG/M2 | HEART RATE: 62 BPM | OXYGEN SATURATION: 97 % | DIASTOLIC BLOOD PRESSURE: 82 MMHG

## 2024-02-23 DIAGNOSIS — C20 RECTAL CANCER (H): Primary | ICD-10-CM

## 2024-02-23 LAB
ALBUMIN SERPL BCG-MCNC: 4.2 G/DL (ref 3.5–5.2)
ALP SERPL-CCNC: 75 U/L (ref 40–150)
ALT SERPL W P-5'-P-CCNC: 20 U/L (ref 0–70)
ANION GAP SERPL CALCULATED.3IONS-SCNC: 12 MMOL/L (ref 7–15)
AST SERPL W P-5'-P-CCNC: 26 U/L (ref 0–45)
BASOPHILS # BLD AUTO: 0.1 10E3/UL (ref 0–0.2)
BASOPHILS NFR BLD AUTO: 1 %
BILIRUB SERPL-MCNC: 0.4 MG/DL
BUN SERPL-MCNC: 23.3 MG/DL (ref 8–23)
CALCIUM SERPL-MCNC: 8.9 MG/DL (ref 8.8–10.2)
CEA SERPL-MCNC: 2.5 NG/ML
CHLORIDE SERPL-SCNC: 104 MMOL/L (ref 98–107)
CREAT SERPL-MCNC: 1.03 MG/DL (ref 0.67–1.17)
DEPRECATED HCO3 PLAS-SCNC: 25 MMOL/L (ref 22–29)
EGFRCR SERPLBLD CKD-EPI 2021: 77 ML/MIN/1.73M2
EOSINOPHIL # BLD AUTO: 0.4 10E3/UL (ref 0–0.7)
EOSINOPHIL NFR BLD AUTO: 7 %
ERYTHROCYTE [DISTWIDTH] IN BLOOD BY AUTOMATED COUNT: 16.3 % (ref 10–15)
GLUCOSE SERPL-MCNC: 106 MG/DL (ref 70–99)
HCT VFR BLD AUTO: 41.3 % (ref 40–53)
HGB BLD-MCNC: 14.4 G/DL (ref 13.3–17.7)
IMM GRANULOCYTES # BLD: 0 10E3/UL
IMM GRANULOCYTES NFR BLD: 1 %
LYMPHOCYTES # BLD AUTO: 0.5 10E3/UL (ref 0.8–5.3)
LYMPHOCYTES NFR BLD AUTO: 10 %
MCH RBC QN AUTO: 31.9 PG (ref 26.5–33)
MCHC RBC AUTO-ENTMCNC: 34.9 G/DL (ref 31.5–36.5)
MCV RBC AUTO: 92 FL (ref 78–100)
MONOCYTES # BLD AUTO: 0.7 10E3/UL (ref 0–1.3)
MONOCYTES NFR BLD AUTO: 14 %
NEUTROPHILS # BLD AUTO: 3.3 10E3/UL (ref 1.6–8.3)
NEUTROPHILS NFR BLD AUTO: 67 %
NRBC # BLD AUTO: 0 10E3/UL
NRBC BLD AUTO-RTO: 0 /100
PLATELET # BLD AUTO: 143 10E3/UL (ref 150–450)
POTASSIUM SERPL-SCNC: 3.4 MMOL/L (ref 3.4–5.3)
PROT SERPL-MCNC: 6.7 G/DL (ref 6.4–8.3)
RBC # BLD AUTO: 4.51 10E6/UL (ref 4.4–5.9)
SODIUM SERPL-SCNC: 141 MMOL/L (ref 135–145)
WBC # BLD AUTO: 5 10E3/UL (ref 4–11)

## 2024-02-23 PROCEDURE — 96413 CHEMO IV INFUSION 1 HR: CPT

## 2024-02-23 PROCEDURE — 96374 THER/PROPH/DIAG INJ IV PUSH: CPT

## 2024-02-23 PROCEDURE — 96415 CHEMO IV INFUSION ADDL HR: CPT

## 2024-02-23 PROCEDURE — 80053 COMPREHEN METABOLIC PANEL: CPT | Mod: ZL | Performed by: INTERNAL MEDICINE

## 2024-02-23 PROCEDURE — 85004 AUTOMATED DIFF WBC COUNT: CPT | Mod: ZL | Performed by: INTERNAL MEDICINE

## 2024-02-23 PROCEDURE — 258N000003 HC RX IP 258 OP 636: Performed by: INTERNAL MEDICINE

## 2024-02-23 PROCEDURE — 82378 CARCINOEMBRYONIC ANTIGEN: CPT | Mod: ZL

## 2024-02-23 PROCEDURE — 36415 COLL VENOUS BLD VENIPUNCTURE: CPT | Mod: ZL | Performed by: INTERNAL MEDICINE

## 2024-02-23 PROCEDURE — 96375 TX/PRO/DX INJ NEW DRUG ADDON: CPT

## 2024-02-23 PROCEDURE — 96367 TX/PROPH/DG ADDL SEQ IV INF: CPT

## 2024-02-23 PROCEDURE — 250N000011 HC RX IP 250 OP 636: Performed by: INTERNAL MEDICINE

## 2024-02-23 RX ORDER — ONDANSETRON 2 MG/ML
8 INJECTION INTRAMUSCULAR; INTRAVENOUS ONCE
Status: COMPLETED | OUTPATIENT
Start: 2024-02-23 | End: 2024-02-23

## 2024-02-23 RX ADMIN — OXALIPLATIN 300 MG: 5 INJECTION, SOLUTION INTRAVENOUS at 11:48

## 2024-02-23 RX ADMIN — FOSAPREPITANT: 150 INJECTION, POWDER, LYOPHILIZED, FOR SOLUTION INTRAVENOUS at 11:05

## 2024-02-23 RX ADMIN — DEXTROSE MONOHYDRATE 250 ML: 50 INJECTION, SOLUTION INTRAVENOUS at 11:05

## 2024-02-23 RX ADMIN — ONDANSETRON 8 MG: 2 INJECTION INTRAMUSCULAR; INTRAVENOUS at 11:06

## 2024-02-23 NOTE — PROGRESS NOTES
A total of 348 patients were randomly assigned to receive XELOX (oral capecitabine 1,000 mg/m2 bid for 14 days plus oxaliplatin 130 mg/m2 on day 1 every 3 weeks) or FUOX (continuous-infusion FU 2,250 mg/m2 during 48 hours on days 1, 8, 15, 22, 29, and 36 plus oxaliplatin 85 mg/m2 on days 1, 15, and 29 every 6 weeks).

## 2024-02-23 NOTE — TELEPHONE ENCOUNTER
Patient completed radiation therapy fo rectal cancer on 2/6/24.  Follow up call made and spoke with patient.  He states he is doing well and is almost back to his baseline.  He has no questions or concerns and will follow up with medical oncology.  He starts chemotherapy today.     Alee Brennan RN

## 2024-02-23 NOTE — PROGRESS NOTES
Infusion Nursing Note:  Nicolas Malone presents today for oxaliplatin    Patient seen by provider today: No   present during visit today: Not Applicable.    Note: .      Intravenous Access:  Peripheral IV placed.    Treatment Conditions:  Lab Results   Component Value Date    HGB 14.4 02/23/2024    WBC 5.0 02/23/2024    ANEUTAUTO 3.3 02/23/2024     (L) 02/23/2024        Lab Results   Component Value Date     02/23/2024    POTASSIUM 3.4 02/23/2024    CR 1.03 02/23/2024    ROSIO 8.9 02/23/2024    BILITOTAL 0.4 02/23/2024    ALBUMIN 4.2 02/23/2024    ALT 20 02/23/2024    AST 26 02/23/2024       Results reviewed, labs MET treatment parameters, ok to proceed with treatment.      Post Infusion Assessment:  Patient tolerated infusion without incident.  Blood return noted pre and post infusion.  Site patent and intact, free from redness, edema or discomfort.  No evidence of extravasations.  Access discontinued per protocol.       Discharge Plan:   Patient declined prescription refills.  Discharge instructions reviewed with: Patient.  Patient and/or family verbalized understanding of discharge instructions and all questions answered.  Copy of AVS reviewed with patient and/or family.  Patient will return 2 weeks for next appointment.  Patient discharged in stable condition accompanied by: self.  Departure Mode: Ambulatory.      Erendira Rodgers RN

## 2024-02-26 ENCOUNTER — PATIENT OUTREACH (OUTPATIENT)
Dept: ONCOLOGY | Facility: OTHER | Age: 74
End: 2024-02-26

## 2024-02-26 NOTE — PROGRESS NOTES
[facility]: Cancer Care                                                                                          Patient called back and was wondering if he could take paxlovid. Checked for interactions with capecitabine, there are no interactions. Advised to reach out to PCP for prescriptions.     Signature:  Analisa Holguin RN

## 2024-02-26 NOTE — PROGRESS NOTES
[facility]: Cancer Care                                                                                        Patient called with symptoms after first treatment with oxaliplatin. Pt reports a burning sensation in arm at the IV site. He developed cough, sneezing on Friday. With sneezing he had some sharp pain which has subsided. He tested positive for covid on Sunday. Discussed covid precautions, such as quarantining for 5 days and then masking when in public for an additional 5 days. Patient verbalizes understanding. Patient denies fevers or SOB and mild cough. [facility], but if has any worsening of symptoms to call right away. Message left with patient.       Signature:  Analisa Holguin RN

## 2024-02-28 ENCOUNTER — PATIENT OUTREACH (OUTPATIENT)
Dept: ONCOLOGY | Facility: OTHER | Age: 74
End: 2024-02-28

## 2024-02-28 NOTE — PROGRESS NOTES
[facility]: Cancer Care                                                                                        TC to patient for status check. He continues to have symptoms but non are severe. No fevers or SOB noted. Patient encouraged to call if he has worsening symptoms.       Signature:  Analisa Holguin RN

## 2024-03-01 ENCOUNTER — DOCUMENTATION ONLY (OUTPATIENT)
Dept: ONCOLOGY | Facility: OTHER | Age: 74
End: 2024-03-01

## 2024-03-01 NOTE — PROGRESS NOTES
Oral Chemotherapy Monitoring Program    Primary Oncologist: Dr. Estrada  Drug: capecitabine (Xeloda) 2000 mg BID; 14 days on and 7 days off (21-days cycle)  Start Date: 2/23/2024    Subjective/Objective:  Nicolas Malone is a 73 year old male contacted by phone for a follow-up visit for oral chemotherapy.         12/7/2023    10:00 AM 1/4/2024    12:00 PM 2/15/2024    10:00 AM 2/16/2024     9:00 AM 3/1/2024    11:00 AM   ORAL CHEMOTHERAPY   Assessment Type New Teach Initial Follow up Initial Work up New Teach Initial Follow up   Diagnosis Code Rectal Cancer Rectal Cancer Rectal Cancer Rectal Cancer Rectal Cancer   Providers Sean Estrada   Clinic Name/Location [facility]   Drug Name Xeloda (capecitabine) Xeloda (capecitabine) Xeloda (capecitabine) Xeloda (capecitabine) Xeloda (capecitabine)   Dose 1,500 mg 1,500 mg 2,000 mg 2,000 mg 2,000 mg   Current Schedule BID BID BID BID BID   Cycle Details M-F only during XRT M-F only during XRT 2 weeks on, 1 week off 2 weeks on, 1 week off 2 weeks on, 1 week off   Start Date of Last Cycle  12/28/2023 2/23/2024   Planned next cycle start date     3/15/2024   Doses missed in last 2 weeks  1   0   Adherence Assessment  Adherent   Adherent   Adverse Effects  No AE identified during assessment   No AE identified during assessment   Home BPs  Not applicable   Not applicable   Any new drug interactions?  No  No No   Is the dose as ordered appropriate for the patient?  Yes  Yes Yes   Is the patient currently in pain?  No   No   Has the patient been assessed within the past 6 months for depression?  Yes   Yes   Has the patient missed any days of school, work, or other routine activity?  No   No   Since the last time we talked, have you been hospitalized or used the emergency room?  No   No       Vitals:  BP:   BP Readings from Last 1 Encounters:   02/23/24 136/82     Wt Readings from Last 1 Encounters:   02/23/24 99.1 kg (218 lb  "7.6 oz)     Estimated body surface area is 2.18 meters squared as calculated from the following:    Height as of 2/23/24: 1.727 m (5' 8\").    Weight as of 2/23/24: 99.1 kg (218 lb 7.6 oz).    Labs:  _  Result Component Current Result Ref Range   Sodium 141 (2/23/2024) 135 - 145 mmol/L     _  Result Component Current Result Ref Range   Potassium 3.4 (2/23/2024) 3.4 - 5.3 mmol/L     _  Result Component Current Result Ref Range   Calcium 8.9 (2/23/2024) 8.8 - 10.2 mg/dL     No results found for Mag within last 30 days.     No results found for Phos within last 30 days.     _  Result Component Current Result Ref Range   Albumin 4.2 (2/23/2024) 3.5 - 5.2 g/dL     _  Result Component Current Result Ref Range   Urea Nitrogen 23.3 (H) (2/23/2024) 8.0 - 23.0 mg/dL     _  Result Component Current Result Ref Range   Creatinine 1.03 (2/23/2024) 0.67 - 1.17 mg/dL       _  Result Component Current Result Ref Range   AST 26 (2/23/2024) 0 - 45 U/L     _  Result Component Current Result Ref Range   ALT 20 (2/23/2024) 0 - 70 U/L     _  Result Component Current Result Ref Range   Bilirubin Total 0.4 (2/23/2024) <=1.2 mg/dL       _  Result Component Current Result Ref Range   WBC Count 5.0 (2/23/2024) 4.0 - 11.0 10e3/uL     _  Result Component Current Result Ref Range   Hemoglobin 14.4 (2/23/2024) 13.3 - 17.7 g/dL     _  Result Component Current Result Ref Range   Platelet Count 143 (L) (2/23/2024) 150 - 450 10e3/uL     No results found for ANC within last 30 days.         Assessment/Plan:  I spoke with patient today. Patient reports that he tested positive for COVID a few days ago. Aside from that, patient reports doing well on oral chemotherapy. Patient reports hiccupping and burping that has recently started. Patient instructed to notify provider if symptoms persist or worsen. Patient verbalized understanding and agreement to plan. Patient is compliant with therapy and no missed doses. Future appointments have been confirmed with " patient. We will continue to follow.    Follow-Up:  3/15/2024 - Appointment with Beatrice Tavera, labs, infusion      Maya Trammell  Oncology Pharmacy Intern  [facility] - [place]  [phone]

## 2024-03-01 NOTE — PROGRESS NOTES
Oral Chemotherapy Monitoring Program   Left Voicemail    Attempted to contact patient today for follow up regarding oral chemotherapy, capecitabine (Xeloda), to verify start date and assess tolerability. No answer. Left voicemail for patient to call us back at [phone] when able. No medication name was left.    Maya Trammell  Oncology Pharmacy Intern  [facility] - [place]  [phone]

## 2024-03-04 DIAGNOSIS — C20 RECTAL CANCER (H): Primary | ICD-10-CM

## 2024-03-05 ENCOUNTER — INFUSION THERAPY VISIT (OUTPATIENT)
Dept: INFUSION THERAPY | Facility: OTHER | Age: 74
End: 2024-03-05
Attending: INTERNAL MEDICINE
Payer: MEDICARE

## 2024-03-05 ENCOUNTER — PATIENT OUTREACH (OUTPATIENT)
Dept: ONCOLOGY | Facility: OTHER | Age: 74
End: 2024-03-05

## 2024-03-05 VITALS
BODY MASS INDEX: 30.77 KG/M2 | HEIGHT: 68 IN | SYSTOLIC BLOOD PRESSURE: 184 MMHG | WEIGHT: 203.04 LBS | DIASTOLIC BLOOD PRESSURE: 84 MMHG | HEART RATE: 68 BPM | TEMPERATURE: 97.3 F | OXYGEN SATURATION: 96 %

## 2024-03-05 DIAGNOSIS — C20 RECTAL CANCER (H): Primary | ICD-10-CM

## 2024-03-05 DIAGNOSIS — E86.0 DEHYDRATION: ICD-10-CM

## 2024-03-05 LAB
ACANTHOCYTES BLD QL SMEAR: NORMAL
ALBUMIN SERPL BCG-MCNC: 4 G/DL (ref 3.5–5.2)
ALP SERPL-CCNC: 78 U/L (ref 40–150)
ALT SERPL W P-5'-P-CCNC: 33 U/L (ref 0–70)
ANION GAP SERPL CALCULATED.3IONS-SCNC: 14 MMOL/L (ref 7–15)
AST SERPL W P-5'-P-CCNC: 35 U/L (ref 0–45)
AUER BODIES BLD QL SMEAR: NORMAL
BASO STIPL BLD QL SMEAR: NORMAL
BASOPHILS # BLD AUTO: 0 10E3/UL (ref 0–0.2)
BASOPHILS NFR BLD AUTO: 0 %
BILIRUB SERPL-MCNC: 0.9 MG/DL
BITE CELLS BLD QL SMEAR: NORMAL
BLISTER CELLS BLD QL SMEAR: NORMAL
BUN SERPL-MCNC: 25.7 MG/DL (ref 8–23)
BURR CELLS BLD QL SMEAR: NORMAL
CALCIUM SERPL-MCNC: 8.8 MG/DL (ref 8.8–10.2)
CHLORIDE SERPL-SCNC: 90 MMOL/L (ref 98–107)
CREAT SERPL-MCNC: 1.1 MG/DL (ref 0.67–1.17)
DACRYOCYTES BLD QL SMEAR: NORMAL
DEPRECATED HCO3 PLAS-SCNC: 26 MMOL/L (ref 22–29)
EGFRCR SERPLBLD CKD-EPI 2021: 71 ML/MIN/1.73M2
ELLIPTOCYTES BLD QL SMEAR: NORMAL
EOSINOPHIL # BLD AUTO: 0 10E3/UL (ref 0–0.7)
EOSINOPHIL NFR BLD AUTO: 0 %
ERYTHROCYTE [DISTWIDTH] IN BLOOD BY AUTOMATED COUNT: 15.5 % (ref 10–15)
FRAGMENTS BLD QL SMEAR: NORMAL
GIANT PLATELETS BLD QL SMEAR: NORMAL
GLUCOSE SERPL-MCNC: 134 MG/DL (ref 70–99)
HCT VFR BLD AUTO: 42.6 % (ref 40–53)
HGB BLD-MCNC: 15.7 G/DL (ref 13.3–17.7)
HGB C CRYSTALS: NORMAL
HOWELL-JOLLY BOD BLD QL SMEAR: NORMAL
IMM GRANULOCYTES # BLD: 0.1 10E3/UL
IMM GRANULOCYTES NFR BLD: 1 %
LYMPHOCYTES # BLD AUTO: 0.2 10E3/UL (ref 0–5.3)
LYMPHOCYTES NFR BLD AUTO: 2 %
MAGNESIUM SERPL-MCNC: 2 MG/DL (ref 1.7–2.3)
MCH RBC QN AUTO: 31.5 PG (ref 26.5–33)
MCHC RBC AUTO-ENTMCNC: 36.9 G/DL (ref 31.5–36.5)
MCV RBC AUTO: 85 FL (ref 78–100)
MONOCYTES # BLD AUTO: 1.1 10E3/UL (ref 0–1.3)
MONOCYTES NFR BLD AUTO: 14 %
NEUTROPHILS # BLD AUTO: 6.8 10E3/UL (ref 1.6–8.3)
NEUTROPHILS NFR BLD AUTO: 83 %
NEUTS HYPERSEG BLD QL SMEAR: NORMAL
NRBC # BLD AUTO: 0 10E3/UL
NRBC BLD AUTO-RTO: 0 /100
PATH REV: NORMAL
PLAT MORPH BLD: NORMAL
PLATELET # BLD AUTO: 104 10E3/UL (ref 150–450)
POLYCHROMASIA BLD QL SMEAR: NORMAL
POTASSIUM SERPL-SCNC: 3.5 MMOL/L (ref 3.4–5.3)
PROT SERPL-MCNC: 6.7 G/DL (ref 6.4–8.3)
RBC # BLD AUTO: 4.99 10E6/UL (ref 4.4–5.9)
RBC AGGLUT BLD QL: NORMAL
RBC MORPH BLD: NORMAL
ROULEAUX BLD QL SMEAR: NORMAL
SICKLE CELLS BLD QL SMEAR: NORMAL
SMUDGE CELLS BLD QL SMEAR: NORMAL
SODIUM SERPL-SCNC: 130 MMOL/L (ref 135–145)
SPHEROCYTES BLD QL SMEAR: NORMAL
STOMATOCYTES BLD QL SMEAR: NORMAL
TARGETS BLD QL SMEAR: NORMAL
TOXIC GRANULES BLD QL SMEAR: NORMAL
VARIANT LYMPHS BLD QL SMEAR: NORMAL
WBC # BLD AUTO: 8.2 10E3/UL (ref 4–11)

## 2024-03-05 PROCEDURE — 36415 COLL VENOUS BLD VENIPUNCTURE: CPT | Mod: ZL

## 2024-03-05 PROCEDURE — 258N000003 HC RX IP 258 OP 636: Performed by: INTERNAL MEDICINE

## 2024-03-05 PROCEDURE — 96360 HYDRATION IV INFUSION INIT: CPT

## 2024-03-05 PROCEDURE — 80053 COMPREHEN METABOLIC PANEL: CPT | Mod: ZL

## 2024-03-05 PROCEDURE — 85025 COMPLETE CBC W/AUTO DIFF WBC: CPT | Mod: ZL

## 2024-03-05 PROCEDURE — 83735 ASSAY OF MAGNESIUM: CPT | Mod: ZL

## 2024-03-05 RX ADMIN — SODIUM CHLORIDE 1000 ML: 9 INJECTION, SOLUTION INTRAVENOUS at 15:07

## 2024-03-05 NOTE — PROGRESS NOTES
[facility]: Cancer Care                                                                                          Spoke with JASMIN Chase to come in for IV hydration if able and we will check labs. Okay to run 1 liter of NS over 1 hour. Also needs to hold capecitabine for the remainder of the cycle. Patient has 3 days remaining. Patient called and will be here in the next 30 to 40 min.     Signature:  Analisa Holguin RN

## 2024-03-05 NOTE — PATIENT INSTRUCTIONS
Thank you for scheduling your appointment with us today. If you have any questions or concerns related to procedure/medication at today's visit, please contact your primary care provider, or the provider who ordered today's procedure/medication. If you have any questions related to scheduling with our unit, or if you are having trouble getting in contact with your ordering provider, we can be reached at [phone].        Drink as much food and fluids as you can. Dr Estrada would like you to drink fluids with electrolytes in them, like Gatorade, in addition to water. Take your nausea medications as needed and as directed, at the first sign of nausea. Call us if you are taking it and it is not effective. You can use over the counter imodium for diarrhea, use per package instructions, and call us if this is not managing loose stools.

## 2024-03-05 NOTE — PROGRESS NOTES
"Infusion Nursing Note:  Nicolas Malone presents today for IVF.    Patient seen by provider today: No   present during visit today: Not Applicable.    Note: Call at approx 1350 from [facility] noting patient having loose stools, vomiting, not eating or drinking much/well and provider would like him to come in for IVF with labs prior. Advised patient would need to get here as soon as possible but we could accept.    On arrival, after resting in chair a few minutes, Cindy CORONADO reporting a BP of 191/104 (verified by manual recheck). Patient reporting he has not taken his BP meds today. Denies headaches or chest pain. Call to Dr Estrada updating. Initially she gave orders to HOLD IVF today, draw labs and keep patient on unit until labs result (in case ER visit needed) and plan for possibly infusing tomorrow after patient has taken BP meds. Spoke with patient at length, he reports approx 1 soft or loose stool a day. Vomited once last night, notes he hasn't vomited in 45 years prior to that. Can eat/drink but \"about a 1/10 of what I used to.\"  Notes not much tastes or smells good. Call to Dr Estrada and updated.     After PIV placed, rechecked BP and it has come down to a range patient has been at (158/80), patient continues to report he knows he needs IVF. Call to Dr Estrada, updated, TORB with ok to run IVF, get at least 500mL in patient, watch labs. Nurse will recheck BP approx 15min into fluids to ensure not climbing. Patient updated, agreeable.     1525: BP climbing. Rate decreased to 500mL / hr and reached out to Dr Estrada with update. Order via secure chat to stop IVF at 500mL total volume.     1547: MD updated re BP at this time. After labs resulted, she noted patient is somewhat dehydrated and she would like him to push fluids orville those with electrolytes like Gatorade. Patient updated.     Intravenous Access:  Peripheral IV placed.    Treatment Conditions:  Not Applicable.      Post Infusion Assessment:  Patient " tolerated infusion without incident.  Site patent and intact, free from redness, edema or discomfort.  No evidence of extravasations.  Access discontinued per protocol.   Patient had many questions about peripheral IV and ports, wanted to know the difference in amount of sticks and how med is infused. Explained at length. Offers no further questions or concerns.     Discharge Plan:   Patient discharged in stable condition accompanied by: self.  Departure Mode: Ambulatory.

## 2024-03-05 NOTE — PROGRESS NOTES
[facility]: Cancer Care                                                                                          Patient called to report symptoms. Patient had tested positive for covid on 2-26 but was symptomatic as of 2-22-24. He has developed more and more nausea, vomited up his antinausea medications. Not eating or drinking much fluids. Reports that his mouth is dry and urine is dark in color. He denies fevers and has a slight cough. He had not taken his capecitabine today and was working up to it. Advised to hold until advised to take by Dr. Estrada. Is also reporting hadns and feet hurting, but no skin issues.       Signature:  Analisa Holguin RN

## 2024-03-08 ENCOUNTER — LAB (OUTPATIENT)
Dept: ONCOLOGY | Facility: OTHER | Age: 74
End: 2024-03-08
Attending: NURSE PRACTITIONER
Payer: MEDICARE

## 2024-03-08 ENCOUNTER — PATIENT OUTREACH (OUTPATIENT)
Dept: ONCOLOGY | Facility: OTHER | Age: 74
End: 2024-03-08

## 2024-03-08 ENCOUNTER — INFUSION THERAPY VISIT (OUTPATIENT)
Dept: INFUSION THERAPY | Facility: OTHER | Age: 74
End: 2024-03-08
Attending: NURSE PRACTITIONER
Payer: MEDICARE

## 2024-03-08 VITALS — DIASTOLIC BLOOD PRESSURE: 86 MMHG | SYSTOLIC BLOOD PRESSURE: 179 MMHG | HEART RATE: 66 BPM

## 2024-03-08 VITALS
WEIGHT: 198.63 LBS | TEMPERATURE: 98.7 F | HEIGHT: 67 IN | SYSTOLIC BLOOD PRESSURE: 146 MMHG | DIASTOLIC BLOOD PRESSURE: 92 MMHG | OXYGEN SATURATION: 97 % | HEART RATE: 80 BPM | BODY MASS INDEX: 31.18 KG/M2

## 2024-03-08 DIAGNOSIS — C20 RECTAL CANCER (H): Primary | ICD-10-CM

## 2024-03-08 DIAGNOSIS — R11.2 CHEMOTHERAPY INDUCED NAUSEA AND VOMITING: ICD-10-CM

## 2024-03-08 DIAGNOSIS — R53.83 FATIGUE, UNSPECIFIED TYPE: ICD-10-CM

## 2024-03-08 DIAGNOSIS — T45.1X5A CHEMOTHERAPY INDUCED NAUSEA AND VOMITING: ICD-10-CM

## 2024-03-08 DIAGNOSIS — E86.0 DEHYDRATION: ICD-10-CM

## 2024-03-08 DIAGNOSIS — E87.6 HYPOKALEMIA: ICD-10-CM

## 2024-03-08 DIAGNOSIS — K52.1 CHEMOTHERAPY INDUCED DIARRHEA: ICD-10-CM

## 2024-03-08 DIAGNOSIS — L27.1 HAND FOOT SYNDROME: ICD-10-CM

## 2024-03-08 DIAGNOSIS — T45.1X5A CHEMOTHERAPY INDUCED DIARRHEA: ICD-10-CM

## 2024-03-08 DIAGNOSIS — C20 RECTAL CANCER (H): ICD-10-CM

## 2024-03-08 LAB
ALBUMIN SERPL BCG-MCNC: 3.9 G/DL (ref 3.5–5.2)
ALP SERPL-CCNC: 76 U/L (ref 40–150)
ALT SERPL W P-5'-P-CCNC: 30 U/L (ref 0–70)
ANION GAP SERPL CALCULATED.3IONS-SCNC: 11 MMOL/L (ref 7–15)
AST SERPL W P-5'-P-CCNC: 30 U/L (ref 0–45)
BASOPHILS # BLD AUTO: 0 10E3/UL (ref 0–0.2)
BASOPHILS NFR BLD AUTO: 0 %
BILIRUB SERPL-MCNC: 1.1 MG/DL
BUN SERPL-MCNC: 26.2 MG/DL (ref 8–23)
CALCIUM SERPL-MCNC: 8.5 MG/DL (ref 8.8–10.2)
CHLORIDE SERPL-SCNC: 90 MMOL/L (ref 98–107)
CREAT SERPL-MCNC: 1.15 MG/DL (ref 0.67–1.17)
DEPRECATED HCO3 PLAS-SCNC: 29 MMOL/L (ref 22–29)
EGFRCR SERPLBLD CKD-EPI 2021: 68 ML/MIN/1.73M2
EOSINOPHIL # BLD AUTO: 0 10E3/UL (ref 0–0.7)
EOSINOPHIL NFR BLD AUTO: 1 %
ERYTHROCYTE [DISTWIDTH] IN BLOOD BY AUTOMATED COUNT: 15.4 % (ref 10–15)
GLUCOSE SERPL-MCNC: 127 MG/DL (ref 70–99)
HCT VFR BLD AUTO: 44.2 % (ref 40–53)
HGB BLD-MCNC: 16.4 G/DL (ref 13.3–17.7)
IMM GRANULOCYTES # BLD: 0.1 10E3/UL
IMM GRANULOCYTES NFR BLD: 1 %
LYMPHOCYTES # BLD AUTO: 0.2 10E3/UL (ref 0–5.3)
LYMPHOCYTES NFR BLD AUTO: 3 %
MAGNESIUM SERPL-MCNC: 2.2 MG/DL (ref 1.7–2.3)
MCH RBC QN AUTO: 31.9 PG (ref 26.5–33)
MCHC RBC AUTO-ENTMCNC: 37.1 G/DL (ref 31.5–36.5)
MCV RBC AUTO: 86 FL (ref 78–100)
MONOCYTES # BLD AUTO: 1 10E3/UL (ref 0–1.3)
MONOCYTES NFR BLD AUTO: 16 %
NEUTROPHILS # BLD AUTO: 5.2 10E3/UL (ref 1.6–8.3)
NEUTROPHILS NFR BLD AUTO: 80 %
NRBC # BLD AUTO: 0 10E3/UL
NRBC BLD AUTO-RTO: 0 /100
PLATELET # BLD AUTO: 152 10E3/UL (ref 150–450)
POTASSIUM SERPL-SCNC: 3.1 MMOL/L (ref 3.4–5.3)
PROT SERPL-MCNC: 6.5 G/DL (ref 6.4–8.3)
RBC # BLD AUTO: 5.14 10E6/UL (ref 4.4–5.9)
SODIUM SERPL-SCNC: 130 MMOL/L (ref 135–145)
WBC # BLD AUTO: 6.5 10E3/UL (ref 4–11)

## 2024-03-08 PROCEDURE — 36415 COLL VENOUS BLD VENIPUNCTURE: CPT | Mod: ZL

## 2024-03-08 PROCEDURE — 96368 THER/DIAG CONCURRENT INF: CPT

## 2024-03-08 PROCEDURE — 96365 THER/PROPH/DIAG IV INF INIT: CPT

## 2024-03-08 PROCEDURE — 84155 ASSAY OF PROTEIN SERUM: CPT | Mod: ZL

## 2024-03-08 PROCEDURE — 99215 OFFICE O/P EST HI 40 MIN: CPT | Performed by: NURSE PRACTITIONER

## 2024-03-08 PROCEDURE — 96366 THER/PROPH/DIAG IV INF ADDON: CPT

## 2024-03-08 PROCEDURE — 82040 ASSAY OF SERUM ALBUMIN: CPT | Mod: ZL

## 2024-03-08 PROCEDURE — 258N000003 HC RX IP 258 OP 636: Performed by: INTERNAL MEDICINE

## 2024-03-08 PROCEDURE — 250N000011 HC RX IP 250 OP 636: Performed by: NURSE PRACTITIONER

## 2024-03-08 PROCEDURE — 83735 ASSAY OF MAGNESIUM: CPT | Mod: ZL

## 2024-03-08 PROCEDURE — 85025 COMPLETE CBC W/AUTO DIFF WBC: CPT | Mod: ZL

## 2024-03-08 PROCEDURE — G0463 HOSPITAL OUTPT CLINIC VISIT: HCPCS

## 2024-03-08 RX ORDER — CAPECITABINE 500 MG/1
1000 TABLET, FILM COATED ORAL 2 TIMES DAILY
Qty: 112 TABLET | Refills: 0 | Status: SHIPPED | OUTPATIENT
Start: 2024-03-09 | End: 2024-04-05

## 2024-03-08 RX ORDER — POTASSIUM CHLORIDE 7.45 MG/ML
10 INJECTION INTRAVENOUS ONCE
Status: COMPLETED | OUTPATIENT
Start: 2024-03-08 | End: 2024-03-08

## 2024-03-08 RX ORDER — UREA 8.5 G/85G
CREAM TOPICAL PRN
Qty: 85 G | Refills: 1 | Status: SHIPPED | OUTPATIENT
Start: 2024-03-08

## 2024-03-08 RX ADMIN — SODIUM CHLORIDE 1000 ML: 9 INJECTION, SOLUTION INTRAVENOUS at 13:46

## 2024-03-08 RX ADMIN — POTASSIUM CHLORIDE 10 MEQ: 7.46 INJECTION, SOLUTION INTRAVENOUS at 14:07

## 2024-03-08 RX ADMIN — POTASSIUM CHLORIDE 10 MEQ: 7.46 INJECTION, SOLUTION INTRAVENOUS at 15:10

## 2024-03-08 ASSESSMENT — PAIN SCALES - GENERAL: PAINLEVEL: NO PAIN (0)

## 2024-03-08 NOTE — PROGRESS NOTES
Infusion Nursing Note:  Nicolas Malone presents today for IVF and K+ replacement .    Patient seen by provider today: Yes: Susan Tavera   present during visit today: Not Applicable.    Note: .      Intravenous Access:  Peripheral IV placed.    Treatment Conditions:  Lab Results   Component Value Date    HGB 16.4 03/08/2024    WBC 6.5 03/08/2024    ANEUTAUTO 5.2 03/08/2024     03/08/2024        Lab Results   Component Value Date     (L) 03/08/2024    POTASSIUM 3.1 (L) 03/08/2024    MAG 2.2 03/08/2024    CR 1.15 03/08/2024    ROSIO 8.5 (L) 03/08/2024    BILITOTAL 1.1 03/08/2024    ALBUMIN 3.9 03/08/2024    ALT 30 03/08/2024    AST 30 03/08/2024       Results reviewed, labs MET treatment parameters, ok to proceed with treatment.      Post Infusion Assessment:  Patient tolerated infusion without incident.  Blood return noted pre and post infusion.  Site patent and intact, free from redness, edema or discomfort.  No evidence of extravasations.  Access discontinued per protocol.       Discharge Plan:   Patient declined prescription refills.  Patient and/or family verbalized understanding of discharge instructions and all questions answered.  AVS to patient via [employer].  Patient will return Tuesday for next appointment.   Patient discharged in stable condition accompanied by: self.  Departure Mode:.Wheel chair and transport service      Erendira Rodgers RN

## 2024-03-08 NOTE — PROGRESS NOTES
[facility]: Cancer Care                                                                                        Patient called and reports that he is still not feeling well. He states he is 15 lbs down from 2 weeks ago. He reports having watery stools daily, yesterday he had 6. He states that he is not having cramps he will sit on the toilet and it will poor out of him. He states he did take imodium, but only 2 tablets. He reports that he does not have an appetite, yesterday he had 1/2 of a banana, 1/2 of a bagel, 1/2 egg, and about 6 ounce's of soup. He feels dehydrated. If needed he is willing to come in this afternoon for a status check.        Signature:  Analisa Holguin RN

## 2024-03-08 NOTE — PROGRESS NOTES
Patient noted discomfort in arm with IV placement with only 15 mls of K+ left.  Patient med stopped and flused with NS syringes.  Pain subsided.  No infiltration or phlebitis noted.  [place] blood return noted.  Provider gone for day.  Patient educated that K+ can cause discomfort, but we opted to end infusion at this time as patient stated pain was intense.  15 Mls wasted.  Patient discharged with out further incident.

## 2024-03-08 NOTE — NURSING NOTE
"Oncology Rooming Note    March 8, 2024 1:00 PM   Nicolas Malone is a 73 year old male who presents for:    Chief Complaint   Patient presents with    Oncology Clinic Visit     Symptom follow up.     Initial Vitals: BP (!) 146/92 (BP Location: Right arm, Patient Position: Sitting, Cuff Size: Adult Large)   Pulse 80   Temp 98.7  F (37.1  C) (Tympanic)   Ht 1.702 m (5' 7\")   Wt 90.1 kg (198 lb 10.2 oz)   SpO2 97%   BMI 31.11 kg/m   Estimated body mass index is 31.11 kg/m  as calculated from the following:    Height as of this encounter: 1.702 m (5' 7\").    Weight as of this encounter: 90.1 kg (198 lb 10.2 oz). Body surface area is 2.06 meters squared.  No Pain (0) Comment: Data Unavailable   No LMP for male patient.  Allergies reviewed: Yes  Medications reviewed: Yes    Medications: Medication refills not needed today.  Pharmacy name entered into [facility]:    [id number] [place], [place] - [address]  AT [facility] [address]  [facility] - [place], [place] - [address]    Frailty Screening:   Is the patient here for a new oncology consult visit in cancer care? 2. No      Clinical concerns: discussed with Lulu Allen LPN              "

## 2024-03-08 NOTE — PROGRESS NOTES
22 gauge angio cath inserted into rt upper arm.  Immediate blood return noted. Wasted 3mL blood, ordered labs taken. Flushed with 3mL normal saline, flushes easily without resistance, no signs or symptoms of infiltration or infection. IV secured with sterile, transparent dressing and tape.  Patient tolerated well, denies pain or discomfort at this time. IV left in place for upcoming infusion appointment. Patient discharged ambulatory.
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
## 2024-03-08 NOTE — PROGRESS NOTES
Can we get him in for labs and hydration this afternoon and I will plan on seeing him in infusion.     ANDREINA Rosario CNP

## 2024-03-08 NOTE — PROGRESS NOTES
[facility] [place]: Cancer Care                                                                                          TC to patient and offered an appointment today with Beatrice with labs first. He will come in at 1230 and see Beatrice with possibility of hydration after.     Signature:  Analisa Holguin RN

## 2024-03-12 ENCOUNTER — INFUSION THERAPY VISIT (OUTPATIENT)
Dept: INFUSION THERAPY | Facility: OTHER | Age: 74
End: 2024-03-12
Attending: NURSE PRACTITIONER
Payer: MEDICARE

## 2024-03-12 VITALS
HEART RATE: 58 BPM | OXYGEN SATURATION: 93 % | DIASTOLIC BLOOD PRESSURE: 68 MMHG | TEMPERATURE: 98.4 F | BODY MASS INDEX: 30.59 KG/M2 | WEIGHT: 194.89 LBS | HEIGHT: 67 IN | SYSTOLIC BLOOD PRESSURE: 145 MMHG

## 2024-03-12 DIAGNOSIS — T45.1X5A CHEMOTHERAPY INDUCED NAUSEA AND VOMITING: ICD-10-CM

## 2024-03-12 DIAGNOSIS — R11.2 CHEMOTHERAPY INDUCED NAUSEA AND VOMITING: ICD-10-CM

## 2024-03-12 DIAGNOSIS — T45.1X5A CHEMOTHERAPY INDUCED DIARRHEA: ICD-10-CM

## 2024-03-12 DIAGNOSIS — K52.1 CHEMOTHERAPY INDUCED DIARRHEA: ICD-10-CM

## 2024-03-12 DIAGNOSIS — C20 RECTAL CANCER (H): ICD-10-CM

## 2024-03-12 DIAGNOSIS — E87.6 HYPOKALEMIA: ICD-10-CM

## 2024-03-12 LAB
ALBUMIN SERPL BCG-MCNC: 3.7 G/DL (ref 3.5–5.2)
ALP SERPL-CCNC: 80 U/L (ref 40–150)
ALT SERPL W P-5'-P-CCNC: 59 U/L (ref 0–70)
ANION GAP SERPL CALCULATED.3IONS-SCNC: 13 MMOL/L (ref 7–15)
AST SERPL W P-5'-P-CCNC: 57 U/L (ref 0–45)
BILIRUB SERPL-MCNC: 1.2 MG/DL
BUN SERPL-MCNC: 18.3 MG/DL (ref 8–23)
CALCIUM SERPL-MCNC: 8.8 MG/DL (ref 8.8–10.2)
CHLORIDE SERPL-SCNC: 92 MMOL/L (ref 98–107)
CREAT SERPL-MCNC: 1.09 MG/DL (ref 0.67–1.17)
DEPRECATED HCO3 PLAS-SCNC: 28 MMOL/L (ref 22–29)
EGFRCR SERPLBLD CKD-EPI 2021: 72 ML/MIN/1.73M2
GLUCOSE SERPL-MCNC: 119 MG/DL (ref 70–99)
POTASSIUM SERPL-SCNC: 2.8 MMOL/L (ref 3.4–5.3)
PROT SERPL-MCNC: 6.5 G/DL (ref 6.4–8.3)
SODIUM SERPL-SCNC: 133 MMOL/L (ref 135–145)

## 2024-03-12 PROCEDURE — 80053 COMPREHEN METABOLIC PANEL: CPT | Mod: ZL

## 2024-03-12 PROCEDURE — 96374 THER/PROPH/DIAG INJ IV PUSH: CPT

## 2024-03-12 PROCEDURE — 36591 DRAW BLOOD OFF VENOUS DEVICE: CPT | Mod: ZL

## 2024-03-12 PROCEDURE — 250N000011 HC RX IP 250 OP 636: Performed by: NURSE PRACTITIONER

## 2024-03-12 PROCEDURE — 96366 THER/PROPH/DIAG IV INF ADDON: CPT

## 2024-03-12 PROCEDURE — 96365 THER/PROPH/DIAG IV INF INIT: CPT

## 2024-03-12 PROCEDURE — 36415 COLL VENOUS BLD VENIPUNCTURE: CPT | Mod: ZL

## 2024-03-12 RX ORDER — POTASSIUM CHLORIDE 1500 MG/1
TABLET, EXTENDED RELEASE ORAL
Qty: 60 TABLET | Refills: 1 | Status: SHIPPED | OUTPATIENT
Start: 2024-03-12 | End: 2024-05-03

## 2024-03-12 RX ORDER — SODIUM CHLORIDE AND POTASSIUM CHLORIDE 300; 900 MG/100ML; MG/100ML
INJECTION, SOLUTION INTRAVENOUS ONCE
Status: COMPLETED | OUTPATIENT
Start: 2024-03-12 | End: 2024-03-12

## 2024-03-12 RX ADMIN — POTASSIUM CHLORIDE AND SODIUM CHLORIDE: 900; 300 INJECTION, SOLUTION INTRAVENOUS at 11:40

## 2024-03-12 ASSESSMENT — PAIN SCALES - GENERAL: PAINLEVEL: NO PAIN (0)

## 2024-03-12 NOTE — PROGRESS NOTES
Infusion Nursing Note:  Nicolas Malone presents today for labs from port with possible electrolyte replacement.    Patient seen by provider today: No   present during visit today: Not Applicable.    Note: See providers note.      Intravenous Access:  Peripheral IV placed.    Treatment Conditions:  Lab Results   Component Value Date    HGB 16.4 03/08/2024    WBC 6.5 03/08/2024    ANEUTAUTO 5.2 03/08/2024     03/08/2024        Lab Results   Component Value Date     (L) 03/12/2024    POTASSIUM 2.8 (L) 03/12/2024    MAG 2.2 03/08/2024    CR 1.09 03/12/2024    ROSIO 8.8 03/12/2024    BILITOTAL 1.2 03/12/2024    ALBUMIN 3.7 03/12/2024    ALT 59 03/12/2024    AST 57 (H) 03/12/2024       Results reviewed, labs MET treatment parameters, ok to proceed with treatment.      Post Infusion Assessment:  Patient tolerated infusion without incident.  Blood return noted pre and post infusion.  Site patent and intact, free from redness, edema or discomfort.  No evidence of extravasations.  Access discontinued per protocol.       Discharge Plan:   Prescription refills given for Klor con 20 mEq..  Discharge instructions reviewed with: Patient.  AVS to patient via [employer].  Patient will return Friday for next appointment.   Patient discharged in stable condition accompanied by: self.  Departure Mode: Ambulatory.      Erendira Rodgers RN

## 2024-03-12 NOTE — PROGRESS NOTES
Reviewed CMP with infusion. Will replace with 40 meq IV today, take additional 20 meq at home. Will take 20 meq tomorrow morning and daily thereafter.     Will recheck Friday. Order sent.     ANDREINA Rosario CNP
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
## 2024-03-15 ENCOUNTER — INFUSION THERAPY VISIT (OUTPATIENT)
Dept: INFUSION THERAPY | Facility: OTHER | Age: 74
End: 2024-03-15
Attending: INTERNAL MEDICINE
Payer: MEDICARE

## 2024-03-15 ENCOUNTER — ONCOLOGY VISIT (OUTPATIENT)
Dept: ONCOLOGY | Facility: OTHER | Age: 74
End: 2024-03-15
Attending: NURSE PRACTITIONER
Payer: MEDICARE

## 2024-03-15 VITALS
BODY MASS INDEX: 31.55 KG/M2 | DIASTOLIC BLOOD PRESSURE: 82 MMHG | HEART RATE: 85 BPM | HEIGHT: 67 IN | WEIGHT: 201 LBS | TEMPERATURE: 98 F | OXYGEN SATURATION: 98 % | SYSTOLIC BLOOD PRESSURE: 144 MMHG

## 2024-03-15 DIAGNOSIS — C20 RECTAL CANCER (H): ICD-10-CM

## 2024-03-15 DIAGNOSIS — R53.83 FATIGUE, UNSPECIFIED TYPE: ICD-10-CM

## 2024-03-15 DIAGNOSIS — E87.6 HYPOKALEMIA: Primary | ICD-10-CM

## 2024-03-15 DIAGNOSIS — T45.1X5A CHEMOTHERAPY INDUCED NAUSEA AND VOMITING: ICD-10-CM

## 2024-03-15 DIAGNOSIS — R11.2 CHEMOTHERAPY INDUCED NAUSEA AND VOMITING: ICD-10-CM

## 2024-03-15 DIAGNOSIS — K52.1 CHEMOTHERAPY INDUCED DIARRHEA: ICD-10-CM

## 2024-03-15 DIAGNOSIS — C20 RECTAL CANCER (H): Primary | ICD-10-CM

## 2024-03-15 DIAGNOSIS — T45.1X5A CHEMOTHERAPY INDUCED DIARRHEA: ICD-10-CM

## 2024-03-15 DIAGNOSIS — L27.1 HAND FOOT SYNDROME: ICD-10-CM

## 2024-03-15 DIAGNOSIS — U07.1 INFECTION DUE TO 2019 NOVEL CORONAVIRUS: ICD-10-CM

## 2024-03-15 LAB
ALBUMIN SERPL BCG-MCNC: 3.4 G/DL (ref 3.5–5.2)
ALP SERPL-CCNC: 80 U/L (ref 40–150)
ALT SERPL W P-5'-P-CCNC: 60 U/L (ref 0–70)
ANION GAP SERPL CALCULATED.3IONS-SCNC: 10 MMOL/L (ref 7–15)
AST SERPL W P-5'-P-CCNC: 48 U/L (ref 0–45)
BASOPHILS # BLD AUTO: 0 10E3/UL (ref 0–0.2)
BASOPHILS NFR BLD AUTO: 1 %
BILIRUB SERPL-MCNC: 0.6 MG/DL
BUN SERPL-MCNC: 16 MG/DL (ref 8–23)
CALCIUM SERPL-MCNC: 8.8 MG/DL (ref 8.8–10.2)
CHLORIDE SERPL-SCNC: 97 MMOL/L (ref 98–107)
CREAT SERPL-MCNC: 0.98 MG/DL (ref 0.67–1.17)
DEPRECATED HCO3 PLAS-SCNC: 27 MMOL/L (ref 22–29)
EGFRCR SERPLBLD CKD-EPI 2021: 82 ML/MIN/1.73M2
EOSINOPHIL # BLD AUTO: 0.3 10E3/UL (ref 0–0.7)
EOSINOPHIL NFR BLD AUTO: 6 %
ERYTHROCYTE [DISTWIDTH] IN BLOOD BY AUTOMATED COUNT: 16.4 % (ref 10–15)
GLUCOSE SERPL-MCNC: 133 MG/DL (ref 70–99)
HCT VFR BLD AUTO: 38.3 % (ref 40–53)
HGB BLD-MCNC: 13.9 G/DL (ref 13.3–17.7)
IMM GRANULOCYTES # BLD: 0.2 10E3/UL
IMM GRANULOCYTES NFR BLD: 4 %
LYMPHOCYTES # BLD AUTO: 0.5 10E3/UL (ref 0.8–5.3)
LYMPHOCYTES NFR BLD AUTO: 10 %
MCH RBC QN AUTO: 32.4 PG (ref 26.5–33)
MCHC RBC AUTO-ENTMCNC: 36.3 G/DL (ref 31.5–36.5)
MCV RBC AUTO: 89 FL (ref 78–100)
MONOCYTES # BLD AUTO: 1 10E3/UL (ref 0–1.3)
MONOCYTES NFR BLD AUTO: 19 %
NEUTROPHILS # BLD AUTO: 2.9 10E3/UL (ref 1.6–8.3)
NEUTROPHILS NFR BLD AUTO: 59 %
NRBC # BLD AUTO: 0 10E3/UL
NRBC BLD AUTO-RTO: 0 /100
PLATELET # BLD AUTO: 149 10E3/UL (ref 150–450)
POTASSIUM SERPL-SCNC: 3.4 MMOL/L (ref 3.4–5.3)
PROT SERPL-MCNC: 6 G/DL (ref 6.4–8.3)
RBC # BLD AUTO: 4.29 10E6/UL (ref 4.4–5.9)
SODIUM SERPL-SCNC: 134 MMOL/L (ref 135–145)
WBC # BLD AUTO: 5 10E3/UL (ref 4–11)

## 2024-03-15 PROCEDURE — 99214 OFFICE O/P EST MOD 30 MIN: CPT | Performed by: NURSE PRACTITIONER

## 2024-03-15 PROCEDURE — 85025 COMPLETE CBC W/AUTO DIFF WBC: CPT | Mod: ZL | Performed by: NURSE PRACTITIONER

## 2024-03-15 PROCEDURE — 82040 ASSAY OF SERUM ALBUMIN: CPT | Mod: ZL | Performed by: NURSE PRACTITIONER

## 2024-03-15 PROCEDURE — 36415 COLL VENOUS BLD VENIPUNCTURE: CPT | Mod: ZL | Performed by: NURSE PRACTITIONER

## 2024-03-15 PROCEDURE — G0463 HOSPITAL OUTPT CLINIC VISIT: HCPCS

## 2024-03-15 RX ORDER — DEXAMETHASONE 4 MG/1
8 TABLET ORAL DAILY
Qty: 4 TABLET | Refills: 2 | Status: SHIPPED | OUTPATIENT
Start: 2024-03-15 | End: 2024-04-11

## 2024-03-15 ASSESSMENT — PAIN SCALES - GENERAL: PAINLEVEL: NO PAIN (0)

## 2024-03-15 NOTE — PROGRESS NOTES
Patient PIV placed by MN,LPN today.  Peripheral labs ordered. Angio cath needle inserted into right arm.  Immediate blood return noted. Ordered labs drawn. Needle removed, covered with sterile guaze and coban. Patient tolerated well, denies pain or discomfort at this time. Patient transported to medical oncology.

## 2024-03-15 NOTE — NURSING NOTE
"Oncology Rooming Note    March 15, 2024 10:07 AM   Nicolas Malone is a 73 year old male who presents for:    Chief Complaint   Patient presents with    Oncology Clinic Visit     Follow up. Rectal cancer      Initial Vitals: BP (!) 144/82 (BP Location: Left arm, Patient Position: Sitting, Cuff Size: Adult Regular)   Pulse 85   Temp 98  F (36.7  C) (Tympanic)   Ht 1.702 m (5' 7\")   Wt 91.2 kg (201 lb)   SpO2 98%   BMI 31.48 kg/m   Estimated body mass index is 31.48 kg/m  as calculated from the following:    Height as of this encounter: 1.702 m (5' 7\").    Weight as of this encounter: 91.2 kg (201 lb). Body surface area is 2.08 meters squared.  No Pain (0) Comment: Data Unavailable   No LMP for male patient.  Allergies reviewed: Yes  Medications reviewed: Yes    Medications: MEDICATION REFILLS NEEDED TODAY. Provider was notified.  Pharmacy name entered into [facility]:    [id number] [place], [place] - [address]  AT [facility] [address]  [facility] - [place], [place] - [address]    Frailty Screening:   Is the patient here for a new oncology consult visit in cancer care? 2. No      Clinical concerns: discussed with Lulu Allen LPN              "

## 2024-03-15 NOTE — PROGRESS NOTES
Psychosocial distress thermometer reviewed. Pt rated stress score as  1. Pt rated stressors as none. Provider notified, and issues addressed.  Camila Allen LPN
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
## 2024-03-22 ENCOUNTER — ONCOLOGY VISIT (OUTPATIENT)
Dept: ONCOLOGY | Facility: OTHER | Age: 74
End: 2024-03-22
Attending: NURSE PRACTITIONER
Payer: MEDICARE

## 2024-03-22 ENCOUNTER — INFUSION THERAPY VISIT (OUTPATIENT)
Dept: INFUSION THERAPY | Facility: OTHER | Age: 74
End: 2024-03-22
Attending: INTERNAL MEDICINE
Payer: MEDICARE

## 2024-03-22 ENCOUNTER — INFUSION THERAPY VISIT (OUTPATIENT)
Dept: INFUSION THERAPY | Facility: OTHER | Age: 74
End: 2024-03-22
Attending: NURSE PRACTITIONER
Payer: MEDICARE

## 2024-03-22 VITALS
SYSTOLIC BLOOD PRESSURE: 138 MMHG | DIASTOLIC BLOOD PRESSURE: 62 MMHG | RESPIRATION RATE: 16 BRPM | OXYGEN SATURATION: 98 % | WEIGHT: 207.7 LBS | TEMPERATURE: 97.5 F | BODY MASS INDEX: 32.53 KG/M2 | HEART RATE: 62 BPM

## 2024-03-22 VITALS
DIASTOLIC BLOOD PRESSURE: 74 MMHG | SYSTOLIC BLOOD PRESSURE: 128 MMHG | HEIGHT: 67 IN | BODY MASS INDEX: 31.48 KG/M2 | TEMPERATURE: 98 F | HEART RATE: 65 BPM | OXYGEN SATURATION: 98 %

## 2024-03-22 DIAGNOSIS — C20 RECTAL CANCER (H): Primary | ICD-10-CM

## 2024-03-22 DIAGNOSIS — R11.2 CHEMOTHERAPY INDUCED NAUSEA AND VOMITING: ICD-10-CM

## 2024-03-22 DIAGNOSIS — T45.1X5A CHEMOTHERAPY INDUCED NAUSEA AND VOMITING: ICD-10-CM

## 2024-03-22 DIAGNOSIS — K52.1 CHEMOTHERAPY INDUCED DIARRHEA: ICD-10-CM

## 2024-03-22 DIAGNOSIS — L27.1 HAND FOOT SYNDROME: ICD-10-CM

## 2024-03-22 DIAGNOSIS — T45.1X5A CHEMOTHERAPY INDUCED DIARRHEA: ICD-10-CM

## 2024-03-22 DIAGNOSIS — E87.6 HYPOKALEMIA: ICD-10-CM

## 2024-03-22 LAB
ALBUMIN SERPL BCG-MCNC: 3.6 G/DL (ref 3.5–5.2)
ALP SERPL-CCNC: 79 U/L (ref 40–150)
ALT SERPL W P-5'-P-CCNC: 34 U/L (ref 0–70)
ANION GAP SERPL CALCULATED.3IONS-SCNC: 8 MMOL/L (ref 7–15)
AST SERPL W P-5'-P-CCNC: 36 U/L (ref 0–45)
BASOPHILS # BLD AUTO: 0.1 10E3/UL (ref 0–0.2)
BASOPHILS NFR BLD AUTO: 1 %
BILIRUB SERPL-MCNC: 0.5 MG/DL
BUN SERPL-MCNC: 15.4 MG/DL (ref 8–23)
CALCIUM SERPL-MCNC: 9.1 MG/DL (ref 8.8–10.2)
CHLORIDE SERPL-SCNC: 101 MMOL/L (ref 98–107)
CREAT SERPL-MCNC: 1.06 MG/DL (ref 0.67–1.17)
DEPRECATED HCO3 PLAS-SCNC: 29 MMOL/L (ref 22–29)
EGFRCR SERPLBLD CKD-EPI 2021: 75 ML/MIN/1.73M2
EOSINOPHIL # BLD AUTO: 0.3 10E3/UL (ref 0–0.7)
EOSINOPHIL NFR BLD AUTO: 6 %
ERYTHROCYTE [DISTWIDTH] IN BLOOD BY AUTOMATED COUNT: 16.6 % (ref 10–15)
GLUCOSE SERPL-MCNC: 103 MG/DL (ref 70–99)
HCT VFR BLD AUTO: 37.9 % (ref 40–53)
HGB BLD-MCNC: 13.1 G/DL (ref 13.3–17.7)
IMM GRANULOCYTES # BLD: 0 10E3/UL
IMM GRANULOCYTES NFR BLD: 1 %
LYMPHOCYTES # BLD AUTO: 0.4 10E3/UL (ref 0.8–5.3)
LYMPHOCYTES NFR BLD AUTO: 9 %
MCH RBC QN AUTO: 32 PG (ref 26.5–33)
MCHC RBC AUTO-ENTMCNC: 34.6 G/DL (ref 31.5–36.5)
MCV RBC AUTO: 93 FL (ref 78–100)
MONOCYTES # BLD AUTO: 0.9 10E3/UL (ref 0–1.3)
MONOCYTES NFR BLD AUTO: 19 %
NEUTROPHILS # BLD AUTO: 3.2 10E3/UL (ref 1.6–8.3)
NEUTROPHILS NFR BLD AUTO: 65 %
NRBC # BLD AUTO: 0 10E3/UL
NRBC BLD AUTO-RTO: 0 /100
PLATELET # BLD AUTO: 136 10E3/UL (ref 150–450)
POTASSIUM SERPL-SCNC: 4.3 MMOL/L (ref 3.4–5.3)
PROT SERPL-MCNC: 6.1 G/DL (ref 6.4–8.3)
RBC # BLD AUTO: 4.09 10E6/UL (ref 4.4–5.9)
SODIUM SERPL-SCNC: 138 MMOL/L (ref 135–145)
WBC # BLD AUTO: 5 10E3/UL (ref 4–11)

## 2024-03-22 PROCEDURE — 96413 CHEMO IV INFUSION 1 HR: CPT

## 2024-03-22 PROCEDURE — 82040 ASSAY OF SERUM ALBUMIN: CPT | Mod: ZL | Performed by: NURSE PRACTITIONER

## 2024-03-22 PROCEDURE — 36415 COLL VENOUS BLD VENIPUNCTURE: CPT | Mod: ZL | Performed by: NURSE PRACTITIONER

## 2024-03-22 PROCEDURE — 96367 TX/PROPH/DG ADDL SEQ IV INF: CPT

## 2024-03-22 PROCEDURE — 96375 TX/PRO/DX INJ NEW DRUG ADDON: CPT

## 2024-03-22 PROCEDURE — 85025 COMPLETE CBC W/AUTO DIFF WBC: CPT | Mod: ZL | Performed by: NURSE PRACTITIONER

## 2024-03-22 PROCEDURE — 82378 CARCINOEMBRYONIC ANTIGEN: CPT | Mod: ZL

## 2024-03-22 PROCEDURE — 250N000011 HC RX IP 250 OP 636: Performed by: NURSE PRACTITIONER

## 2024-03-22 PROCEDURE — G0463 HOSPITAL OUTPT CLINIC VISIT: HCPCS

## 2024-03-22 PROCEDURE — 99214 OFFICE O/P EST MOD 30 MIN: CPT | Performed by: NURSE PRACTITIONER

## 2024-03-22 PROCEDURE — 250N000011 HC RX IP 250 OP 636: Performed by: INTERNAL MEDICINE

## 2024-03-22 PROCEDURE — 96415 CHEMO IV INFUSION ADDL HR: CPT

## 2024-03-22 PROCEDURE — 258N000003 HC RX IP 258 OP 636: Performed by: NURSE PRACTITIONER

## 2024-03-22 RX ORDER — MEPERIDINE HYDROCHLORIDE 25 MG/ML
25 INJECTION INTRAMUSCULAR; INTRAVENOUS; SUBCUTANEOUS EVERY 30 MIN PRN
Status: CANCELLED | OUTPATIENT
Start: 2024-03-22

## 2024-03-22 RX ORDER — LORAZEPAM 2 MG/ML
0.5 INJECTION INTRAMUSCULAR EVERY 4 HOURS PRN
Status: CANCELLED | OUTPATIENT
Start: 2024-03-22

## 2024-03-22 RX ORDER — DIPHENHYDRAMINE HYDROCHLORIDE 50 MG/ML
50 INJECTION INTRAMUSCULAR; INTRAVENOUS
Status: CANCELLED
Start: 2024-03-22

## 2024-03-22 RX ORDER — HEPARIN SODIUM,PORCINE 10 UNIT/ML
5-20 VIAL (ML) INTRAVENOUS DAILY PRN
Status: CANCELLED | OUTPATIENT
Start: 2024-03-22

## 2024-03-22 RX ORDER — HEPARIN SODIUM (PORCINE) LOCK FLUSH IV SOLN 100 UNIT/ML 100 UNIT/ML
5 SOLUTION INTRAVENOUS
Status: CANCELLED | OUTPATIENT
Start: 2024-03-22

## 2024-03-22 RX ORDER — EPINEPHRINE 1 MG/ML
0.3 INJECTION, SOLUTION, CONCENTRATE INTRAVENOUS EVERY 5 MIN PRN
Status: CANCELLED | OUTPATIENT
Start: 2024-03-22

## 2024-03-22 RX ORDER — DIPHENHYDRAMINE HCL 25 MG
25 CAPSULE ORAL ONCE
Status: COMPLETED | OUTPATIENT
Start: 2024-03-22 | End: 2024-03-22

## 2024-03-22 RX ORDER — ALBUTEROL SULFATE 0.83 MG/ML
2.5 SOLUTION RESPIRATORY (INHALATION)
Status: CANCELLED | OUTPATIENT
Start: 2024-03-22

## 2024-03-22 RX ORDER — ONDANSETRON 2 MG/ML
8 INJECTION INTRAMUSCULAR; INTRAVENOUS ONCE
Status: COMPLETED | OUTPATIENT
Start: 2024-03-22 | End: 2024-03-22

## 2024-03-22 RX ORDER — ALBUTEROL SULFATE 90 UG/1
1-2 AEROSOL, METERED RESPIRATORY (INHALATION)
Status: CANCELLED
Start: 2024-03-22

## 2024-03-22 RX ORDER — METHYLPREDNISOLONE SODIUM SUCCINATE 125 MG/2ML
125 INJECTION, POWDER, LYOPHILIZED, FOR SOLUTION INTRAMUSCULAR; INTRAVENOUS
Status: CANCELLED
Start: 2024-03-22

## 2024-03-22 RX ADMIN — Medication 25 MG: at 14:22

## 2024-03-22 RX ADMIN — FOSAPREPITANT: 150 INJECTION, POWDER, LYOPHILIZED, FOR SOLUTION INTRAVENOUS at 10:54

## 2024-03-22 RX ADMIN — DEXTROSE MONOHYDRATE 250 ML: 50 INJECTION, SOLUTION INTRAVENOUS at 11:42

## 2024-03-22 RX ADMIN — OXALIPLATIN 300 MG: 5 INJECTION, SOLUTION INTRAVENOUS at 11:40

## 2024-03-22 RX ADMIN — ONDANSETRON 8 MG: 2 INJECTION INTRAMUSCULAR; INTRAVENOUS at 10:48

## 2024-03-22 ASSESSMENT — PAIN SCALES - GENERAL: PAINLEVEL: NO PAIN (0)

## 2024-03-22 NOTE — NURSING NOTE
"Oncology Rooming Note    March 22, 2024 10:23 AM   Nicolas Malone is a 73 year old male who presents for:    Chief Complaint   Patient presents with    Oncology Clinic Visit     Follow up.Rectal cancer      Initial Vitals: /74 (BP Location: Left arm, Patient Position: Sitting, Cuff Size: Adult Regular)   Pulse 65   Temp 98  F (36.7  C) (Tympanic)   Ht 1.702 m (5' 7\")   SpO2 98%   BMI 31.48 kg/m   Estimated body mass index is 31.48 kg/m  as calculated from the following:    Height as of this encounter: 1.702 m (5' 7\").    Weight as of 3/15/24: 91.2 kg (201 lb). Body surface area is 2.08 meters squared.  No Pain (0) Comment: Data Unavailable   No LMP for male patient.  Allergies reviewed: Yes  Medications reviewed: Yes    Medications: Medication refills not needed today.  Pharmacy name entered into [facility]:    [id number] [address] AT [facility] OF [facility] [address]  [facility] - [place], [place] - [facility] [address]    Frailty Screening:   Is the patient here for a new oncology consult visit in cancer care? 2. No      Clinical concerns: none       Camila Allen LPN              "

## 2024-03-22 NOTE — PROGRESS NOTES
Infusion Nursing Note:  Nicolas Kira presents today for oxaliplatin.    Patient seen by provider today: Yes: Alyce Matos   present during visit today: Not Applicable.    Note: N/A.      Intravenous Access:  Labs drawn without difficulty.  Peripheral IV placed.    Treatment Conditions:  Lab Results   Component Value Date    HGB 13.1 (L) 03/22/2024    WBC 5.0 03/22/2024    ANEUTAUTO 3.2 03/22/2024     (L) 03/22/2024        Lab Results   Component Value Date     03/22/2024    POTASSIUM 4.3 03/22/2024    MAG 2.2 03/08/2024    CR 1.06 03/22/2024    ROSIO 9.1 03/22/2024    BILITOTAL 0.5 03/22/2024    ALBUMIN 3.6 03/22/2024    ALT 34 03/22/2024    AST 36 03/22/2024       Results reviewed, labs MET treatment parameters, ok to proceed with treatment.

## 2024-03-22 NOTE — PROGRESS NOTES
"    Post Infusion Assessment:  Patient tolerated infusion without incident initially, however after PIV was removed and patient went to the door on discharge, his legs \"felt swimmy\" and his voice got a hoarse quality to it. Patient sat back down. BP elevated but not outside his baseline. Over the course of about 1 hour, patient had waxing/waning sx of hoarseness to voice, a weird feeling in his throat and garbled speech. He reported not being able to make his muscles work to swallow or talk quite right. Nursing could hear the change in speech. See VS, ultimately unchanged. Question the neuropathy caused by Oxaliplatin, though nothing to eat or drink immediately prior. Multiple calls to Beatrice Tavera NP ONC and ultimately decided to give PO Benadryl 25mg and have patient monitored for at least 20min post admin, if sx improving ok to discharge with education re when to call or be seen with worsening or changes on return home. Spent the majority of monitoring period in with patient. On discharge, he noted he felt mostly entirely better and speech noted improved by patient and nurse. Education done with patient as NP requested. Provider updated on discharge.   Site patent and intact, free from redness, edema or discomfort.  No evidence of extravasations.  Access discontinued per protocol.       Discharge Plan:   Patient discharged in stable condition accompanied by: self.  Departure Mode: Ambulatory.      Added nursing communication to treatment plan re monitoring of s/e post Oxaliplatin infusion prior to IV removal.     "

## 2024-03-22 NOTE — PROGRESS NOTES
22 gauge angio cath inserted into left forearm.  Immediate blood return noted.  IV secured with sterile, transparent dressing and tape.  Patient tolerated well, denies pain or discomfort at this time.  Flushes easily without resistance, no signs or symptoms of infiltration or infection. Patient denies questions or concerns regarding infusion and/or medication(s) being administered.    Peripheral labs ordered. Butterfly needle inserted into right AC.  Immediate blood return noted. Ordered labs drawn. Needle removed, covered with sterile guaze and coban. Patient tolerated well, denies pain or discomfort at this time

## 2024-03-23 LAB — CEA SERPL-MCNC: 4.2 NG/ML

## 2024-04-05 DIAGNOSIS — C20 RECTAL CANCER (H): Primary | ICD-10-CM

## 2024-04-05 RX ORDER — CAPECITABINE 500 MG/1
1000 TABLET, FILM COATED ORAL 2 TIMES DAILY
Qty: 112 TABLET | Refills: 0 | Status: SHIPPED | OUTPATIENT
Start: 2024-04-05 | End: 2024-05-03

## 2024-04-09 ENCOUNTER — LAB (OUTPATIENT)
Dept: LAB | Facility: OTHER | Age: 74
End: 2024-04-09
Attending: NURSE PRACTITIONER
Payer: MEDICARE

## 2024-04-09 ENCOUNTER — ONCOLOGY VISIT (OUTPATIENT)
Dept: ONCOLOGY | Facility: OTHER | Age: 74
End: 2024-04-09
Attending: NURSE PRACTITIONER
Payer: MEDICARE

## 2024-04-09 VITALS
HEART RATE: 79 BPM | DIASTOLIC BLOOD PRESSURE: 78 MMHG | TEMPERATURE: 97.4 F | RESPIRATION RATE: 20 BRPM | OXYGEN SATURATION: 94 % | HEIGHT: 67 IN | BODY MASS INDEX: 31.76 KG/M2 | SYSTOLIC BLOOD PRESSURE: 138 MMHG | WEIGHT: 202.38 LBS

## 2024-04-09 DIAGNOSIS — G62.0 NEUROPATHY DUE TO CHEMOTHERAPEUTIC DRUG (H): ICD-10-CM

## 2024-04-09 DIAGNOSIS — C20 RECTAL CANCER (H): Primary | ICD-10-CM

## 2024-04-09 DIAGNOSIS — R68.83 CHILLS (WITHOUT FEVER): ICD-10-CM

## 2024-04-09 DIAGNOSIS — T82.9XXA COMPLICATION OF INTRAVENOUS CATHETER SITE: ICD-10-CM

## 2024-04-09 DIAGNOSIS — E87.6 HYPOKALEMIA: ICD-10-CM

## 2024-04-09 DIAGNOSIS — C20 RECTAL CANCER (H): ICD-10-CM

## 2024-04-09 DIAGNOSIS — T45.1X5A NEUROPATHY DUE TO CHEMOTHERAPEUTIC DRUG (H): ICD-10-CM

## 2024-04-09 DIAGNOSIS — L27.1 PALMAR PLANTAR ERYTHRODYSAESTHESIA: ICD-10-CM

## 2024-04-09 LAB
ALBUMIN SERPL BCG-MCNC: 4.2 G/DL (ref 3.5–5.2)
ALP SERPL-CCNC: 85 U/L (ref 40–150)
ALT SERPL W P-5'-P-CCNC: 18 U/L (ref 0–70)
ANION GAP SERPL CALCULATED.3IONS-SCNC: 10 MMOL/L (ref 7–15)
AST SERPL W P-5'-P-CCNC: 30 U/L (ref 0–45)
BASOPHILS # BLD AUTO: 0 10E3/UL (ref 0–0.2)
BASOPHILS NFR BLD AUTO: 1 %
BILIRUB SERPL-MCNC: 0.3 MG/DL
BUN SERPL-MCNC: 17.8 MG/DL (ref 8–23)
CALCIUM SERPL-MCNC: 9.2 MG/DL (ref 8.8–10.2)
CHLORIDE SERPL-SCNC: 101 MMOL/L (ref 98–107)
CREAT SERPL-MCNC: 0.95 MG/DL (ref 0.67–1.17)
DEPRECATED HCO3 PLAS-SCNC: 28 MMOL/L (ref 22–29)
EGFRCR SERPLBLD CKD-EPI 2021: 85 ML/MIN/1.73M2
EOSINOPHIL # BLD AUTO: 0.2 10E3/UL (ref 0–0.7)
EOSINOPHIL NFR BLD AUTO: 4 %
ERYTHROCYTE [DISTWIDTH] IN BLOOD BY AUTOMATED COUNT: 15.9 % (ref 10–15)
GLUCOSE SERPL-MCNC: 115 MG/DL (ref 70–99)
HCT VFR BLD AUTO: 36.3 %
HGB BLD-MCNC: 13 G/DL
HOLD SPECIMEN: NORMAL
IMM GRANULOCYTES # BLD: 0.1 10E3/UL
IMM GRANULOCYTES NFR BLD: 2 %
LYMPHOCYTES # BLD AUTO: 0.6 10E3/UL (ref 0.8–5.3)
LYMPHOCYTES NFR BLD AUTO: 10 %
MCH RBC QN AUTO: 32.9 PG (ref 26.5–33)
MCHC RBC AUTO-ENTMCNC: 35.8 G/DL (ref 31.5–36.5)
MCV RBC AUTO: 92 FL (ref 78–100)
MONOCYTES # BLD AUTO: 1 10E3/UL (ref 0–1.3)
MONOCYTES NFR BLD AUTO: 18 %
NEUTROPHILS # BLD AUTO: 3.6 10E3/UL (ref 1.6–8.3)
NEUTROPHILS NFR BLD AUTO: 66 %
PLATELET # BLD AUTO: 114 10E3/UL (ref 150–450)
POTASSIUM SERPL-SCNC: 3.4 MMOL/L (ref 3.4–5.3)
PROT SERPL-MCNC: 6.3 G/DL (ref 6.4–8.3)
RBC # BLD AUTO: 3.95 10E6/UL
SODIUM SERPL-SCNC: 139 MMOL/L (ref 135–145)
TSH SERPL DL<=0.005 MIU/L-ACNC: 1.96 UIU/ML (ref 0.3–4.2)
WBC # BLD AUTO: 5.4 10E3/UL (ref 4–11)

## 2024-04-09 PROCEDURE — 99215 OFFICE O/P EST HI 40 MIN: CPT | Performed by: NURSE PRACTITIONER

## 2024-04-09 PROCEDURE — 36415 COLL VENOUS BLD VENIPUNCTURE: CPT | Mod: ZL

## 2024-04-09 PROCEDURE — 84443 ASSAY THYROID STIM HORMONE: CPT | Mod: ZL

## 2024-04-09 PROCEDURE — 80053 COMPREHEN METABOLIC PANEL: CPT | Mod: ZL

## 2024-04-09 PROCEDURE — G0463 HOSPITAL OUTPT CLINIC VISIT: HCPCS

## 2024-04-09 PROCEDURE — 85025 COMPLETE CBC W/AUTO DIFF WBC: CPT | Mod: ZL

## 2024-04-09 PROCEDURE — 82378 CARCINOEMBRYONIC ANTIGEN: CPT | Mod: ZL

## 2024-04-09 RX ORDER — ALBUTEROL SULFATE 90 UG/1
1-2 AEROSOL, METERED RESPIRATORY (INHALATION)
Status: CANCELLED
Start: 2024-04-12

## 2024-04-09 RX ORDER — ALBUTEROL SULFATE 0.83 MG/ML
2.5 SOLUTION RESPIRATORY (INHALATION)
Status: CANCELLED | OUTPATIENT
Start: 2024-04-12

## 2024-04-09 RX ORDER — HEPARIN SODIUM,PORCINE 10 UNIT/ML
5-20 VIAL (ML) INTRAVENOUS DAILY PRN
Status: CANCELLED | OUTPATIENT
Start: 2024-04-12

## 2024-04-09 RX ORDER — ONDANSETRON 2 MG/ML
8 INJECTION INTRAMUSCULAR; INTRAVENOUS ONCE
Status: CANCELLED | OUTPATIENT
Start: 2024-04-12

## 2024-04-09 RX ORDER — METHYLPREDNISOLONE SODIUM SUCCINATE 125 MG/2ML
125 INJECTION, POWDER, LYOPHILIZED, FOR SOLUTION INTRAMUSCULAR; INTRAVENOUS
Status: CANCELLED
Start: 2024-04-12

## 2024-04-09 RX ORDER — ALBUTEROL SULFATE 0.83 MG/ML
2.5 SOLUTION RESPIRATORY (INHALATION)
Status: CANCELLED | OUTPATIENT
Start: 2024-04-09

## 2024-04-09 RX ORDER — EPINEPHRINE 1 MG/ML
0.3 INJECTION, SOLUTION INTRAMUSCULAR; SUBCUTANEOUS EVERY 5 MIN PRN
Status: CANCELLED | OUTPATIENT
Start: 2024-04-09

## 2024-04-09 RX ORDER — ALBUTEROL SULFATE 90 UG/1
1-2 AEROSOL, METERED RESPIRATORY (INHALATION)
Status: CANCELLED
Start: 2024-04-09

## 2024-04-09 RX ORDER — MEPERIDINE HYDROCHLORIDE 25 MG/ML
25 INJECTION INTRAMUSCULAR; INTRAVENOUS; SUBCUTANEOUS EVERY 30 MIN PRN
Status: CANCELLED | OUTPATIENT
Start: 2024-04-12

## 2024-04-09 RX ORDER — LORAZEPAM 2 MG/ML
0.5 INJECTION INTRAMUSCULAR EVERY 4 HOURS PRN
Status: CANCELLED | OUTPATIENT
Start: 2024-04-12

## 2024-04-09 RX ORDER — DIPHENHYDRAMINE HYDROCHLORIDE 50 MG/ML
50 INJECTION INTRAMUSCULAR; INTRAVENOUS ONCE
Status: CANCELLED
Start: 2024-04-12 | End: 2024-04-12

## 2024-04-09 RX ORDER — HEPARIN SODIUM,PORCINE 10 UNIT/ML
5-20 VIAL (ML) INTRAVENOUS DAILY PRN
Status: CANCELLED | OUTPATIENT
Start: 2024-04-09

## 2024-04-09 RX ORDER — LORAZEPAM 2 MG/ML
0.5 INJECTION INTRAMUSCULAR EVERY 4 HOURS PRN
Status: CANCELLED | OUTPATIENT
Start: 2024-04-09

## 2024-04-09 RX ORDER — HEPARIN SODIUM (PORCINE) LOCK FLUSH IV SOLN 100 UNIT/ML 100 UNIT/ML
5 SOLUTION INTRAVENOUS
Status: CANCELLED | OUTPATIENT
Start: 2024-04-12

## 2024-04-09 RX ORDER — DIPHENHYDRAMINE HYDROCHLORIDE 50 MG/ML
50 INJECTION INTRAMUSCULAR; INTRAVENOUS
Status: CANCELLED
Start: 2024-04-09

## 2024-04-09 RX ORDER — EPINEPHRINE 1 MG/ML
0.3 INJECTION, SOLUTION INTRAMUSCULAR; SUBCUTANEOUS EVERY 5 MIN PRN
Status: CANCELLED | OUTPATIENT
Start: 2024-04-12

## 2024-04-09 RX ORDER — HEPARIN SODIUM (PORCINE) LOCK FLUSH IV SOLN 100 UNIT/ML 100 UNIT/ML
5 SOLUTION INTRAVENOUS
Status: CANCELLED | OUTPATIENT
Start: 2024-04-09

## 2024-04-09 RX ORDER — MEPERIDINE HYDROCHLORIDE 25 MG/ML
25 INJECTION INTRAMUSCULAR; INTRAVENOUS; SUBCUTANEOUS EVERY 30 MIN PRN
Status: CANCELLED | OUTPATIENT
Start: 2024-04-09

## 2024-04-09 RX ORDER — METHYLPREDNISOLONE SODIUM SUCCINATE 125 MG/2ML
125 INJECTION, POWDER, LYOPHILIZED, FOR SOLUTION INTRAMUSCULAR; INTRAVENOUS ONCE
Status: CANCELLED
Start: 2024-04-12 | End: 2024-04-12

## 2024-04-09 RX ORDER — METHYLPREDNISOLONE SODIUM SUCCINATE 125 MG/2ML
125 INJECTION, POWDER, LYOPHILIZED, FOR SOLUTION INTRAMUSCULAR; INTRAVENOUS
Status: CANCELLED
Start: 2024-04-09

## 2024-04-09 ASSESSMENT — PAIN SCALES - GENERAL: PAINLEVEL: NO PAIN (0)

## 2024-04-09 NOTE — NURSING NOTE
"Oncology Rooming Note    April 9, 2024 12:43 PM   Nicolas Malone is a 73 year old male who presents for:    Chief Complaint   Patient presents with    Oncology Clinic Visit     Follow up Rectal cancer        Initial Vitals: /78   Pulse 79   Temp 97.4  F (36.3  C) (Tympanic)   Resp 20   Ht 1.702 m (5' 7\")   Wt 91.8 kg (202 lb 6.1 oz)   SpO2 94%   BMI 31.70 kg/m   Estimated body mass index is 31.7 kg/m  as calculated from the following:    Height as of this encounter: 1.702 m (5' 7\").    Weight as of this encounter: 91.8 kg (202 lb 6.1 oz). Body surface area is 2.08 meters squared.  No Pain (0) Comment: Data Unavailable   No LMP for male patient.  Allergies reviewed: Yes  Medications reviewed: Yes    Medications: Medication refills not needed today.  Pharmacy name entered into [facility]:    [facility] #[id number] [place], [place] - [address]  AT [facility] OF [facility] [address]  [facility] - [place], [place] - [facility] [address]    Frailty Screening:   Is the patient here for a new oncology consult visit in cancer care? 2. No            Dorinda Dick LPN             "
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
## 2024-04-09 NOTE — PATIENT INSTRUCTIONS
We would like to see you back per your schedule.     When you are in need of a refill, please call your pharmacy and they will send us a request.     If you have any questions please call [phone]    Other instructions:  none

## 2024-04-11 ENCOUNTER — PATIENT OUTREACH (OUTPATIENT)
Dept: ONCOLOGY | Facility: OTHER | Age: 74
End: 2024-04-11

## 2024-04-11 DIAGNOSIS — C20 RECTAL CANCER (H): ICD-10-CM

## 2024-04-11 LAB — CEA SERPL-MCNC: 4.2 NG/ML

## 2024-04-11 RX ORDER — DEXAMETHASONE 4 MG/1
8 TABLET ORAL DAILY
Qty: 4 TABLET | Refills: 1 | Status: SHIPPED | OUTPATIENT
Start: 2024-04-11 | End: 2024-05-22

## 2024-04-11 NOTE — PROGRESS NOTES
[facility]: Cancer Care                                                                                        Patient called and wanted it known that after his last infusion, his wife noted facial drooping. This did improve. Patient is also wanting to make sure that his dex is reordered. Informed Dr. Estrada and she feels that it is the acute neurotoxicity from the oxaliplatin. Patient should use warm compresses, trying to keep warm.       Signature:  Analisa Holguin RN

## 2024-04-12 ENCOUNTER — INFUSION THERAPY VISIT (OUTPATIENT)
Dept: INFUSION THERAPY | Facility: OTHER | Age: 74
End: 2024-04-12
Attending: INTERNAL MEDICINE
Payer: MEDICARE

## 2024-04-12 VITALS
OXYGEN SATURATION: 95 % | DIASTOLIC BLOOD PRESSURE: 62 MMHG | TEMPERATURE: 97.9 F | BODY MASS INDEX: 32.36 KG/M2 | HEART RATE: 55 BPM | RESPIRATION RATE: 17 BRPM | SYSTOLIC BLOOD PRESSURE: 124 MMHG | WEIGHT: 206.6 LBS

## 2024-04-12 DIAGNOSIS — C20 RECTAL CANCER (H): Primary | ICD-10-CM

## 2024-04-12 PROCEDURE — 96413 CHEMO IV INFUSION 1 HR: CPT

## 2024-04-12 PROCEDURE — 96374 THER/PROPH/DIAG INJ IV PUSH: CPT

## 2024-04-12 PROCEDURE — 258N000003 HC RX IP 258 OP 636: Performed by: INTERNAL MEDICINE

## 2024-04-12 PROCEDURE — 96365 THER/PROPH/DIAG IV INF INIT: CPT

## 2024-04-12 PROCEDURE — 96375 TX/PRO/DX INJ NEW DRUG ADDON: CPT

## 2024-04-12 PROCEDURE — 96415 CHEMO IV INFUSION ADDL HR: CPT

## 2024-04-12 PROCEDURE — 250N000011 HC RX IP 250 OP 636: Performed by: NURSE PRACTITIONER

## 2024-04-12 PROCEDURE — 96368 THER/DIAG CONCURRENT INF: CPT

## 2024-04-12 PROCEDURE — 96367 TX/PROPH/DG ADDL SEQ IV INF: CPT

## 2024-04-12 PROCEDURE — 250N000011 HC RX IP 250 OP 636: Performed by: INTERNAL MEDICINE

## 2024-04-12 RX ORDER — ONDANSETRON 2 MG/ML
8 INJECTION INTRAMUSCULAR; INTRAVENOUS ONCE
Status: COMPLETED | OUTPATIENT
Start: 2024-04-12 | End: 2024-04-12

## 2024-04-12 RX ORDER — METHYLPREDNISOLONE SODIUM SUCCINATE 125 MG/2ML
125 INJECTION, POWDER, LYOPHILIZED, FOR SOLUTION INTRAMUSCULAR; INTRAVENOUS ONCE
Status: COMPLETED | OUTPATIENT
Start: 2024-04-12 | End: 2024-04-12

## 2024-04-12 RX ADMIN — DIPHENHYDRAMINE HYDROCHLORIDE 50 MG: 50 INJECTION, SOLUTION INTRAMUSCULAR; INTRAVENOUS at 13:05

## 2024-04-12 RX ADMIN — ONDANSETRON 8 MG: 2 INJECTION INTRAMUSCULAR; INTRAVENOUS at 12:42

## 2024-04-12 RX ADMIN — DEXTROSE MONOHYDRATE 250 ML: 50 INJECTION, SOLUTION INTRAVENOUS at 13:31

## 2024-04-12 RX ADMIN — DEXTROSE MONOHYDRATE 250 ML: 50 INJECTION, SOLUTION INTRAVENOUS at 13:32

## 2024-04-12 RX ADMIN — METHYLPREDNISOLONE SODIUM SUCCINATE 125 MG: 125 INJECTION, POWDER, FOR SOLUTION INTRAMUSCULAR; INTRAVENOUS at 13:14

## 2024-04-12 RX ADMIN — OXALIPLATIN 250 MG: 5 INJECTION, SOLUTION, CONCENTRATE INTRAVENOUS at 13:33

## 2024-04-12 RX ADMIN — FOSAPREPITANT: 150 INJECTION, POWDER, LYOPHILIZED, FOR SOLUTION INTRAVENOUS at 12:40

## 2024-04-12 ASSESSMENT — PAIN SCALES - GENERAL: PAINLEVEL: NO PAIN (0)

## 2024-04-12 NOTE — PROGRESS NOTES
Infusion Nursing Note:  Nicolas Malone presents today for Oxaliplatin.     Patient seen by provider today: No Patient seen by provider on Tuesday 4/9/2024 and was cleared for treatment today 4/12/2024, Patient reports no changes to his condition or health since seeing the provider today.  Patient offers no concerns.  Patient reiderated discussion with NP from Tuesday.  I did let provider did update his tx plan to include those additions.  Patient satisfied.     present during visit today: Not Applicable.    Note: Patient offers no concerns.      Intravenous Access:  Peripheral IV placed.    Treatment Conditions:  Lab Results   Component Value Date    HGB 13.0 04/09/2024    WBC 5.4 04/09/2024    ANEUTAUTO 3.6 04/09/2024     (L) 04/09/2024        Lab Results   Component Value Date     04/09/2024    POTASSIUM 3.4 04/09/2024    MAG 2.2 03/08/2024    CR 0.95 04/09/2024    ROSIO 9.2 04/09/2024    BILITOTAL 0.3 04/09/2024    ALBUMIN 4.2 04/09/2024    ALT 18 04/09/2024    AST 30 04/09/2024       Results reviewed, labs MET treatment parameters, ok to proceed with treatment.      Post Infusion Assessment:  Patient tolerated infusion without incident.  Blood return noted pre and post infusion.  Site patent and intact, free from redness, edema or discomfort.  No evidence of extravasations.  Access discontinued per protocol.       Discharge Plan:   Discharge instructions reviewed with: Patient.  AVS to patient via [employer].  Patient will return 3 weeks for next appointment.   Patient discharged in stable condition accompanied by: self.  Departure Mode: Ambulatory.      Erendira Rodgers RN

## 2024-04-12 NOTE — PROGRESS NOTES
Post Infusion Assessment:  Patient tolerated infusion without incident.  Site patent and intact, free from redness, edema or discomfort.  No evidence of extravasations.  Access discontinued per protocol.     Reviewed at length with patient his overall status before and after PIV removal. He denies any sx at this time other than feeling sleepy and cold sensitivity, both from medications received today. No mouth/tongue/face changes noted or reported. No change in sound of voice or words.       Discharge Plan:   Patient discharged in stable condition accompanied by: self.  Departure Mode: Ambulatory.

## 2024-04-12 NOTE — PROGRESS NOTES
Routed treatment plan to Dr Estrada for sign of both oral and infusion chemotherapy.     Secure chat messaging with Dr Estrada re plan and re patient sx at end of last treatment, as well as her recommendation for preventing or managing. She notes use of an extra warm blanket draped around the neck should help. Primary RN updated.

## 2024-04-19 DIAGNOSIS — C20 RECTAL CANCER (H): Primary | ICD-10-CM

## 2024-04-25 DIAGNOSIS — C20 RECTAL CANCER (H): Primary | ICD-10-CM

## 2024-04-25 RX ORDER — CAPECITABINE 500 MG/1
1000 TABLET, FILM COATED ORAL 2 TIMES DAILY
Qty: 112 TABLET | Refills: 0 | Status: SHIPPED | OUTPATIENT
Start: 2024-05-03 | End: 2024-05-22
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
## 2024-04-30 DIAGNOSIS — C20 RECTAL CANCER (H): Primary | ICD-10-CM

## 2024-05-03 ENCOUNTER — INFUSION THERAPY VISIT (OUTPATIENT)
Dept: INFUSION THERAPY | Facility: OTHER | Age: 74
End: 2024-05-03
Attending: INTERNAL MEDICINE
Payer: MEDICARE

## 2024-05-03 ENCOUNTER — INFUSION THERAPY VISIT (OUTPATIENT)
Dept: INFUSION THERAPY | Facility: OTHER | Age: 74
End: 2024-05-03
Attending: NURSE PRACTITIONER
Payer: MEDICARE

## 2024-05-03 ENCOUNTER — ONCOLOGY VISIT (OUTPATIENT)
Dept: ONCOLOGY | Facility: OTHER | Age: 74
End: 2024-05-03
Attending: NURSE PRACTITIONER
Payer: MEDICARE

## 2024-05-03 VITALS
OXYGEN SATURATION: 97 % | HEART RATE: 76 BPM | DIASTOLIC BLOOD PRESSURE: 80 MMHG | TEMPERATURE: 98.3 F | WEIGHT: 196.43 LBS | BODY MASS INDEX: 30.83 KG/M2 | SYSTOLIC BLOOD PRESSURE: 122 MMHG | HEIGHT: 67 IN

## 2024-05-03 VITALS — SYSTOLIC BLOOD PRESSURE: 130 MMHG | DIASTOLIC BLOOD PRESSURE: 67 MMHG | HEART RATE: 60 BPM

## 2024-05-03 DIAGNOSIS — E87.6 HYPOKALEMIA: ICD-10-CM

## 2024-05-03 DIAGNOSIS — L27.1 HAND FOOT SYNDROME: ICD-10-CM

## 2024-05-03 DIAGNOSIS — K59.03 DRUG-INDUCED CONSTIPATION: ICD-10-CM

## 2024-05-03 DIAGNOSIS — C20 RECTAL CANCER (H): Primary | ICD-10-CM

## 2024-05-03 DIAGNOSIS — R63.4 WEIGHT LOSS: ICD-10-CM

## 2024-05-03 DIAGNOSIS — R53.83 FATIGUE, UNSPECIFIED TYPE: ICD-10-CM

## 2024-05-03 LAB
ALBUMIN SERPL BCG-MCNC: 4 G/DL (ref 3.5–5.2)
ALP SERPL-CCNC: 82 U/L (ref 40–150)
ALT SERPL W P-5'-P-CCNC: 24 U/L (ref 0–70)
ANION GAP SERPL CALCULATED.3IONS-SCNC: 11 MMOL/L (ref 7–15)
AST SERPL W P-5'-P-CCNC: 35 U/L (ref 0–45)
BASOPHILS # BLD AUTO: 0 10E3/UL (ref 0–0.2)
BASOPHILS NFR BLD AUTO: 1 %
BILIRUB SERPL-MCNC: 1 MG/DL
BUN SERPL-MCNC: 16.9 MG/DL (ref 8–23)
CALCIUM SERPL-MCNC: 9.4 MG/DL (ref 8.8–10.2)
CHLORIDE SERPL-SCNC: 99 MMOL/L (ref 98–107)
CREAT SERPL-MCNC: 0.99 MG/DL (ref 0.67–1.17)
DEPRECATED HCO3 PLAS-SCNC: 27 MMOL/L (ref 22–29)
EGFRCR SERPLBLD CKD-EPI 2021: 81 ML/MIN/1.73M2
EOSINOPHIL # BLD AUTO: 0.2 10E3/UL (ref 0–0.7)
EOSINOPHIL NFR BLD AUTO: 6 %
ERYTHROCYTE [DISTWIDTH] IN BLOOD BY AUTOMATED COUNT: 17 % (ref 10–15)
GLUCOSE SERPL-MCNC: 117 MG/DL (ref 70–99)
HCT VFR BLD AUTO: 33.4 % (ref 40–53)
HGB BLD-MCNC: 12.1 G/DL (ref 13.3–17.7)
IMM GRANULOCYTES # BLD: 0 10E3/UL
IMM GRANULOCYTES NFR BLD: 1 %
LYMPHOCYTES # BLD AUTO: 0.5 10E3/UL (ref 0.8–5.3)
LYMPHOCYTES NFR BLD AUTO: 16 %
MCH RBC QN AUTO: 34.3 PG (ref 26.5–33)
MCHC RBC AUTO-ENTMCNC: 36.2 G/DL (ref 31.5–36.5)
MCV RBC AUTO: 95 FL (ref 78–100)
MONOCYTES # BLD AUTO: 0.7 10E3/UL (ref 0–1.3)
MONOCYTES NFR BLD AUTO: 23 %
NEUTROPHILS # BLD AUTO: 1.6 10E3/UL (ref 1.6–8.3)
NEUTROPHILS NFR BLD AUTO: 54 %
NRBC # BLD AUTO: 0 10E3/UL
NRBC BLD AUTO-RTO: 0 /100
PLATELET # BLD AUTO: 95 10E3/UL (ref 150–450)
POTASSIUM SERPL-SCNC: 3.4 MMOL/L (ref 3.4–5.3)
PROT SERPL-MCNC: 6.3 G/DL (ref 6.4–8.3)
RBC # BLD AUTO: 3.53 10E6/UL (ref 4.4–5.9)
SODIUM SERPL-SCNC: 137 MMOL/L (ref 135–145)
WBC # BLD AUTO: 3 10E3/UL (ref 4–11)

## 2024-05-03 PROCEDURE — 258N000003 HC RX IP 258 OP 636: Performed by: NURSE PRACTITIONER

## 2024-05-03 PROCEDURE — 96413 CHEMO IV INFUSION 1 HR: CPT

## 2024-05-03 PROCEDURE — 36415 COLL VENOUS BLD VENIPUNCTURE: CPT | Mod: ZL | Performed by: NURSE PRACTITIONER

## 2024-05-03 PROCEDURE — 96375 TX/PRO/DX INJ NEW DRUG ADDON: CPT

## 2024-05-03 PROCEDURE — 82374 ASSAY BLOOD CARBON DIOXIDE: CPT | Mod: ZL | Performed by: NURSE PRACTITIONER

## 2024-05-03 PROCEDURE — 99214 OFFICE O/P EST MOD 30 MIN: CPT | Performed by: NURSE PRACTITIONER

## 2024-05-03 PROCEDURE — G2211 COMPLEX E/M VISIT ADD ON: HCPCS | Performed by: NURSE PRACTITIONER

## 2024-05-03 PROCEDURE — 250N000011 HC RX IP 250 OP 636: Performed by: INTERNAL MEDICINE

## 2024-05-03 PROCEDURE — 96365 THER/PROPH/DIAG IV INF INIT: CPT

## 2024-05-03 PROCEDURE — 96374 THER/PROPH/DIAG INJ IV PUSH: CPT

## 2024-05-03 PROCEDURE — G0463 HOSPITAL OUTPT CLINIC VISIT: HCPCS

## 2024-05-03 PROCEDURE — 96367 TX/PROPH/DG ADDL SEQ IV INF: CPT

## 2024-05-03 PROCEDURE — 96409 CHEMO IV PUSH SNGL DRUG: CPT

## 2024-05-03 PROCEDURE — 85048 AUTOMATED LEUKOCYTE COUNT: CPT | Mod: ZL | Performed by: NURSE PRACTITIONER

## 2024-05-03 PROCEDURE — 96366 THER/PROPH/DIAG IV INF ADDON: CPT

## 2024-05-03 PROCEDURE — 250N000011 HC RX IP 250 OP 636: Performed by: NURSE PRACTITIONER

## 2024-05-03 PROCEDURE — 96376 TX/PRO/DX INJ SAME DRUG ADON: CPT

## 2024-05-03 PROCEDURE — 82378 CARCINOEMBRYONIC ANTIGEN: CPT | Mod: ZL

## 2024-05-03 PROCEDURE — 96415 CHEMO IV INFUSION ADDL HR: CPT

## 2024-05-03 PROCEDURE — 84075 ASSAY ALKALINE PHOSPHATASE: CPT | Mod: ZL | Performed by: NURSE PRACTITIONER

## 2024-05-03 RX ORDER — POTASSIUM CHLORIDE 1500 MG/1
TABLET, EXTENDED RELEASE ORAL
Qty: 60 TABLET | Refills: 1 | Status: SHIPPED | OUTPATIENT
Start: 2024-05-03 | End: 2024-06-07

## 2024-05-03 RX ORDER — EPINEPHRINE 1 MG/ML
0.3 INJECTION, SOLUTION, CONCENTRATE INTRAVENOUS EVERY 5 MIN PRN
Status: CANCELLED | OUTPATIENT
Start: 2024-05-03

## 2024-05-03 RX ORDER — LORAZEPAM 2 MG/ML
0.5 INJECTION INTRAMUSCULAR EVERY 4 HOURS PRN
Status: CANCELLED | OUTPATIENT
Start: 2024-05-03

## 2024-05-03 RX ORDER — ALBUTEROL SULFATE 90 UG/1
1-2 AEROSOL, METERED RESPIRATORY (INHALATION)
Status: CANCELLED
Start: 2024-05-03

## 2024-05-03 RX ORDER — HEPARIN SODIUM (PORCINE) LOCK FLUSH IV SOLN 100 UNIT/ML 100 UNIT/ML
5 SOLUTION INTRAVENOUS
Status: CANCELLED | OUTPATIENT
Start: 2024-05-03

## 2024-05-03 RX ORDER — METHYLPREDNISOLONE SODIUM SUCCINATE 125 MG/2ML
125 INJECTION, POWDER, LYOPHILIZED, FOR SOLUTION INTRAMUSCULAR; INTRAVENOUS ONCE
Status: COMPLETED | OUTPATIENT
Start: 2024-05-03 | End: 2024-05-03

## 2024-05-03 RX ORDER — ALBUTEROL SULFATE 0.83 MG/ML
2.5 SOLUTION RESPIRATORY (INHALATION)
Status: CANCELLED | OUTPATIENT
Start: 2024-05-03

## 2024-05-03 RX ORDER — MEPERIDINE HYDROCHLORIDE 25 MG/ML
25 INJECTION INTRAMUSCULAR; INTRAVENOUS; SUBCUTANEOUS EVERY 30 MIN PRN
Status: CANCELLED | OUTPATIENT
Start: 2024-05-03

## 2024-05-03 RX ORDER — ONDANSETRON 2 MG/ML
8 INJECTION INTRAMUSCULAR; INTRAVENOUS ONCE
Status: COMPLETED | OUTPATIENT
Start: 2024-05-03 | End: 2024-05-03

## 2024-05-03 RX ORDER — HEPARIN SODIUM,PORCINE 10 UNIT/ML
5-20 VIAL (ML) INTRAVENOUS DAILY PRN
Status: CANCELLED | OUTPATIENT
Start: 2024-05-03

## 2024-05-03 RX ORDER — METHYLPREDNISOLONE SODIUM SUCCINATE 125 MG/2ML
125 INJECTION, POWDER, LYOPHILIZED, FOR SOLUTION INTRAMUSCULAR; INTRAVENOUS
Status: CANCELLED
Start: 2024-05-03

## 2024-05-03 RX ADMIN — ONDANSETRON 8 MG: 2 INJECTION INTRAMUSCULAR; INTRAVENOUS at 11:55

## 2024-05-03 RX ADMIN — METHYLPREDNISOLONE SODIUM SUCCINATE 125 MG: 125 INJECTION, POWDER, FOR SOLUTION INTRAMUSCULAR; INTRAVENOUS at 11:51

## 2024-05-03 RX ADMIN — DEXTROSE MONOHYDRATE 250 ML: 50 INJECTION, SOLUTION INTRAVENOUS at 11:51

## 2024-05-03 RX ADMIN — OXALIPLATIN 250 MG: 5 INJECTION, SOLUTION INTRAVENOUS at 12:51

## 2024-05-03 RX ADMIN — DIPHENHYDRAMINE HYDROCHLORIDE 50 MG: 50 INJECTION, SOLUTION INTRAMUSCULAR; INTRAVENOUS at 12:01

## 2024-05-03 RX ADMIN — FOSAPREPITANT: 150 INJECTION, POWDER, LYOPHILIZED, FOR SOLUTION INTRAVENOUS at 12:15

## 2024-05-03 RX ADMIN — DEXTROSE MONOHYDRATE 250 ML: 50 INJECTION, SOLUTION INTRAVENOUS at 12:53

## 2024-05-03 ASSESSMENT — PAIN SCALES - GENERAL: PAINLEVEL: NO PAIN (0)

## 2024-05-03 NOTE — NURSING NOTE
"Oncology Rooming Note    May 3, 2024 10:49 AM   Nicolas Malone is a 73 year old male who presents for:    Chief Complaint   Patient presents with    Oncology Clinic Visit     Follow up. Rectal cancer      Initial Vitals: /80 (BP Location: Right arm, Patient Position: Sitting, Cuff Size: Adult Regular)   Pulse 76   Temp 98.3  F (36.8  C) (Tympanic)   Ht 1.702 m (5' 7\")   Wt 89.1 kg (196 lb 6.9 oz)   SpO2 97%   BMI 30.77 kg/m   Estimated body mass index is 30.77 kg/m  as calculated from the following:    Height as of this encounter: 1.702 m (5' 7\").    Weight as of this encounter: 89.1 kg (196 lb 6.9 oz). Body surface area is 2.05 meters squared.  No Pain (0) Comment: Data Unavailable   No LMP for male patient.  Allergies reviewed: Yes  Medications reviewed: Yes    Medications: Medication refills not needed today.  Pharmacy name entered into [facility]:    [id number] [address]  AT [facility] [address]  [facility] - [place], [place] - [address]    Frailty Screening:   Is the patient here for a new oncology consult visit in cancer care? 2. No      Clinical concerns: fatigue, dry eyes, right eye was mattered this morning, no appetite  Lulu Tavera was notified.      Camila Allen LPN              "

## 2024-05-03 NOTE — PATIENT INSTRUCTIONS

## 2024-05-03 NOTE — PROGRESS NOTES
Infusion Nursing Note:  Nicolas Malone presents today for Oxaliplatin.    Patient seen by provider today: Yes: Beatrice Tavera   present during visit today: Not Applicable.    Note: See provider assessment on this date for details.    Treatment Conditions:  Results reviewed, labs MET treatment parameters, ok to proceed with treatment.    Component      Latest Ref Rng 5/3/2024  10:05 AM   WBC      4.0 - 11.0 10e3/uL 3.0 (L)    RBC Count      4.40 - 5.90 10e6/uL 3.53 (L)    Hemoglobin      13.3 - 17.7 g/dL 12.1 (L)    Hematocrit      40.0 - 53.0 % 33.4 (L)    MCV      78 - 100 fL 95    MCH      26.5 - 33.0 pg 34.3 (H)    MCHC      31.5 - 36.5 g/dL 36.2    RDW      10.0 - 15.0 % 17.0 (H)    Platelet Count      150 - 450 10e3/uL 95 (L)    % Neutrophils      % 54    % Lymphocytes      % 16    % Monocytes      % 23    % Eosinophils      % 6    % Basophils      % 1    % Immature Granulocytes      % 1    NRBCs per 100 WBC      <1 /100 0    Absolute Neutrophils      1.6 - 8.3 10e3/uL 1.6    Absolute Lymphocytes      0.8 - 5.3 10e3/uL 0.5 (L)    Absolute Monocytes      0.0 - 1.3 10e3/uL 0.7    Absolute Eosinophils      0.0 - 0.7 10e3/uL 0.2    Absolute Basophils      0.0 - 0.2 10e3/uL 0.0    Absolute Immature Granulocytes      <=0.4 10e3/uL 0.0    Absolute NRBCs      10e3/uL 0.0    Sodium      135 - 145 mmol/L 137    Potassium      3.4 - 5.3 mmol/L 3.4    Carbon Dioxide (CO2)      22 - 29 mmol/L 27    Anion Gap      7 - 15 mmol/L 11    Urea Nitrogen      8.0 - 23.0 mg/dL 16.9    Creatinine      0.67 - 1.17 mg/dL 0.99    GFR Estimate      >60 mL/min/1.73m2 81    Calcium      8.8 - 10.2 mg/dL 9.4    Chloride      98 - 107 mmol/L 99    Glucose      70 - 99 mg/dL 117 (H)    Alkaline Phosphatase      40 - 150 U/L 82    AST      0 - 45 U/L 35    ALT      0 - 70 U/L 24    Protein Total      6.4 - 8.3 g/dL 6.3 (L)    Albumin      3.5 - 5.2 g/dL 4.0    Bilirubin Total      <=1.2 mg/dL 1.0       Legend:  (L) Low  (H)  High    Post Infusion Assessment:  Patient tolerated infusion without incident.  Blood return noted pre and post infusion.  Site patent and intact, free from redness, edema or discomfort.  No evidence of extravasations.  Access discontinued per protocol.     Discharge Plan:   AVS to patient via MYCHART.    Patient discharged in stable condition accompanied by: self.  Departure Mode: Ambulatory.

## 2024-05-03 NOTE — PROGRESS NOTES
24 gauge angio cath inserted into anterior left forearm.  Immediate blood return noted.  IV secured with sterile, transparent dressing and tape.  Patient tolerated well, denies pain or discomfort at this time.  Flushes easily without resistance, no signs or symptoms of infiltration or infection.  3mL blood wasted and blood removed for ordered labs. Flushed with 3mL normal saline to clear line. Patient denies questions or concerns, and remains with IV in place for potential treatment after his provider visit.

## 2024-05-04 LAB — CEA SERPL-MCNC: 4.4 NG/ML

## 2024-05-13 ENCOUNTER — PATIENT OUTREACH (OUTPATIENT)
Dept: ONCOLOGY | Facility: OTHER | Age: 74
End: 2024-05-13

## 2024-05-13 DIAGNOSIS — C20 RECTAL CANCER (H): Primary | ICD-10-CM

## 2024-05-13 NOTE — PROGRESS NOTES
Received message that Mr. Malone is struggling with chemo. We had previously discussed plan to proceed with C5 to get a complete response in the primary to see if he can avoid a major surgery. Discussed case with Dr. Rodriguez and will plan to repeat imaging here and also sigmoidscopy and Mri at the [facility] around 6/13/2024. This will be around 4 weeks from when he finishes cycle 4 of CAPEOX. We also discussed getting ct chest, abdomen and pelvis, he would like to do them now if possible. Orders placed.

## 2024-05-13 NOTE — PROGRESS NOTES
"[facility]: Cancer Care                                                                                        Patient called with concerns about the plan of care. He is wondering if it is really necessary for him to have another round of treatment. He states that his feet are very painful with this round of treatment, it is effecting his walking. He has no appetite and continues to loss wt. He is wondering if it is possible to have CT scans to determine if it is necessary. If he has to do it he states, \"I will force myself to do it.\" But if not then he would prefer not to.       Signature:  Analisa Holguin RN  "

## 2024-05-13 NOTE — Clinical Note
Hi   Plan for now is to cancel chemo 5/24. I asked him to keep the visit with Beatrice on 5/22. He will follow up with Dr. Rodriguez around 6/13/2024 to see if he has had a response and can stop or continue with more chemo. Can we also schedule CT scan now. I put a CT chest, abdomen and pelvis order in.

## 2024-05-16 DIAGNOSIS — C20 RECTAL CANCER (H): Primary | ICD-10-CM

## 2024-05-16 RX ORDER — CAPECITABINE 500 MG/1
1000 TABLET, FILM COATED ORAL 2 TIMES DAILY
Qty: 112 TABLET | Refills: 0 | Status: SHIPPED | OUTPATIENT
Start: 2024-05-24 | End: 2024-05-22

## 2024-05-17 ENCOUNTER — HOSPITAL ENCOUNTER (OUTPATIENT)
Dept: CT IMAGING | Facility: HOSPITAL | Age: 74
Discharge: HOME | End: 2024-05-17
Attending: INTERNAL MEDICINE | Admitting: INTERNAL MEDICINE
Payer: MEDICARE

## 2024-05-17 DIAGNOSIS — C20 RECTAL CANCER (H): ICD-10-CM

## 2024-05-17 PROCEDURE — 250N000011 HC RX IP 250 OP 636: Performed by: RADIOLOGY

## 2024-05-17 PROCEDURE — 71260 CT THORAX DX C+: CPT | Mod: MG

## 2024-05-17 RX ORDER — IOPAMIDOL 755 MG/ML
96 INJECTION, SOLUTION INTRAVASCULAR ONCE
Status: COMPLETED | OUTPATIENT
Start: 2024-05-17 | End: 2024-05-17

## 2024-05-17 RX ADMIN — IOPAMIDOL 96 ML: 755 INJECTION, SOLUTION INTRAVENOUS at 12:37

## 2024-05-20 ENCOUNTER — PATIENT OUTREACH (OUTPATIENT)
Dept: ONCOLOGY | Facility: OTHER | Age: 74
End: 2024-05-20

## 2024-05-20 ENCOUNTER — INFUSION THERAPY VISIT (OUTPATIENT)
Dept: INFUSION THERAPY | Facility: OTHER | Age: 74
End: 2024-05-20
Attending: NURSE PRACTITIONER
Payer: MEDICARE

## 2024-05-20 VITALS
HEART RATE: 92 BPM | DIASTOLIC BLOOD PRESSURE: 86 MMHG | TEMPERATURE: 99.2 F | OXYGEN SATURATION: 98 % | BODY MASS INDEX: 29.13 KG/M2 | RESPIRATION RATE: 18 BRPM | SYSTOLIC BLOOD PRESSURE: 152 MMHG | WEIGHT: 186 LBS

## 2024-05-20 DIAGNOSIS — C20 RECTAL CANCER (H): Primary | ICD-10-CM

## 2024-05-20 LAB
ALBUMIN SERPL BCG-MCNC: 3.5 G/DL (ref 3.5–5.2)
ALP SERPL-CCNC: 67 U/L (ref 40–150)
ALT SERPL W P-5'-P-CCNC: 27 U/L (ref 0–70)
ANION GAP SERPL CALCULATED.3IONS-SCNC: 8 MMOL/L (ref 7–15)
AST SERPL W P-5'-P-CCNC: 39 U/L (ref 0–45)
BASOPHILS # BLD AUTO: 0 10E3/UL (ref 0–0.2)
BASOPHILS NFR BLD AUTO: 1 %
BILIRUB SERPL-MCNC: 0.8 MG/DL
BUN SERPL-MCNC: 16.8 MG/DL (ref 8–23)
CALCIUM SERPL-MCNC: 8.4 MG/DL (ref 8.8–10.2)
CHLORIDE SERPL-SCNC: 96 MMOL/L (ref 98–107)
CREAT SERPL-MCNC: 0.85 MG/DL (ref 0.67–1.17)
DEPRECATED HCO3 PLAS-SCNC: 27 MMOL/L (ref 22–29)
EGFRCR SERPLBLD CKD-EPI 2021: >90 ML/MIN/1.73M2
EOSINOPHIL # BLD AUTO: 0 10E3/UL (ref 0–0.7)
EOSINOPHIL NFR BLD AUTO: 1 %
ERYTHROCYTE [DISTWIDTH] IN BLOOD BY AUTOMATED COUNT: 15.6 % (ref 10–15)
GLUCOSE SERPL-MCNC: 129 MG/DL (ref 70–99)
HCT VFR BLD AUTO: 29.6 % (ref 40–53)
HGB BLD-MCNC: 11 G/DL (ref 13.3–17.7)
IMM GRANULOCYTES # BLD: 0.2 10E3/UL
IMM GRANULOCYTES NFR BLD: 4 %
LYMPHOCYTES # BLD AUTO: 0.3 10E3/UL (ref 0.8–5.3)
LYMPHOCYTES NFR BLD AUTO: 6 %
MAGNESIUM SERPL-MCNC: 1.6 MG/DL (ref 1.7–2.3)
MCH RBC QN AUTO: 34.9 PG (ref 26.5–33)
MCHC RBC AUTO-ENTMCNC: 37.2 G/DL (ref 31.5–36.5)
MCV RBC AUTO: 94 FL (ref 78–100)
MONOCYTES # BLD AUTO: 0.9 10E3/UL (ref 0–1.3)
MONOCYTES NFR BLD AUTO: 20 %
NEUTROPHILS # BLD AUTO: 3 10E3/UL (ref 1.6–8.3)
NEUTROPHILS NFR BLD AUTO: 69 %
NRBC # BLD AUTO: 0.1 10E3/UL
NRBC BLD AUTO-RTO: 2 /100
PLATELET # BLD AUTO: 115 10E3/UL (ref 150–450)
POTASSIUM SERPL-SCNC: 3.2 MMOL/L (ref 3.4–5.3)
PROT SERPL-MCNC: 5.1 G/DL (ref 6.4–8.3)
RBC # BLD AUTO: 3.15 10E6/UL (ref 4.4–5.9)
SODIUM SERPL-SCNC: 131 MMOL/L (ref 135–145)
WBC # BLD AUTO: 4.4 10E3/UL (ref 4–11)

## 2024-05-20 PROCEDURE — 258N000003 HC RX IP 258 OP 636: Performed by: INTERNAL MEDICINE

## 2024-05-20 PROCEDURE — 83735 ASSAY OF MAGNESIUM: CPT | Mod: ZL

## 2024-05-20 PROCEDURE — 85041 AUTOMATED RBC COUNT: CPT | Mod: ZL

## 2024-05-20 PROCEDURE — 84520 ASSAY OF UREA NITROGEN: CPT | Mod: ZL

## 2024-05-20 PROCEDURE — 96360 HYDRATION IV INFUSION INIT: CPT

## 2024-05-20 PROCEDURE — 36415 COLL VENOUS BLD VENIPUNCTURE: CPT | Mod: ZL

## 2024-05-20 RX ADMIN — SODIUM CHLORIDE 1000 ML: 9 INJECTION, SOLUTION INTRAVENOUS at 15:04

## 2024-05-20 NOTE — PROGRESS NOTES
[facility]: Cancer Care                                                                                          Patient updated on coming in for fluids and when to arrive.     Signature:  Analisa Holguin RN

## 2024-05-20 NOTE — PROGRESS NOTES
Infusion Nursing Note:  Nicolas Malone presents today for IVF per Beatrice Tavera NP ONC.    Patient seen by provider today: No   present during visit today: Not Applicable.    Note: See patient call to ONC RNCC earlier this date and Beatrice Tavera NP ONC response. Patient presents to unit endorsing significant fatigue, sleeping a lot, though does note he was able to mow the lawn on a rider Saturday. Notes poor intake, no appetite. Wife makes him soup and typically gets 1 to 2 bowls in a day. Had an orange today but had a coughing fit  and ended up vomiting. Boosts prn, they sometimes settle his stomach. Last Oxaliplatin tx 5-3-24 but patient notes finished Capecitabine just this past Friday. Slight HTN and elevated pulse today, Temp 99.2. No labs ordered prior to infusion. Reached out to Beatrice with update. Labs placed by provider. Able to obtain off peripheral IV with ease.         Treatment Conditions:  Not Applicable.      Post Infusion Assessment:  Patient tolerated infusion without incident.    Call to Beatrice Tavera NP ONC re labs. She notes her nurse Camial just got off the phone with patient re instructions, no further orders for infusion nursing at this time. She is adding labs to be done prior to her visit and PAC has been requested to call patient to set up as able. Patient updated, verbalizes understanding of both this nurse and his discussion with Camila. Advised if he is still feeling poorly tomorrow, to please reach out to ONC for further direction. Patient verbalizes understanding.    Discharge and IV removal delegated to Cindy CORONADO.

## 2024-05-20 NOTE — PROGRESS NOTES
Called and spoke with [facility]. They can accommodate a liter of fluids at 230.     Yousuf can you please add to schedule and call patient.     Thanks--  Lulu

## 2024-05-20 NOTE — PROGRESS NOTES
[facility]: Cancer Care                                                                                        Patient called and reported that he feels that he needs to come in for hydration today. He is feeling dehydrated, states he weak, and woozie. States that urine is dark, not eating or drinking much and feels that his mouth is dry.       Signature:  Analisa Holguin RN
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
## 2024-05-22 ENCOUNTER — DOCUMENTATION ONLY (OUTPATIENT)
Dept: ONCOLOGY | Facility: OTHER | Age: 74
End: 2024-05-22

## 2024-05-22 ENCOUNTER — ONCOLOGY VISIT (OUTPATIENT)
Dept: ONCOLOGY | Facility: OTHER | Age: 74
End: 2024-05-22
Attending: NURSE PRACTITIONER
Payer: MEDICARE

## 2024-05-22 VITALS
HEIGHT: 67 IN | TEMPERATURE: 97.8 F | OXYGEN SATURATION: 98 % | WEIGHT: 190.26 LBS | BODY MASS INDEX: 29.86 KG/M2 | SYSTOLIC BLOOD PRESSURE: 152 MMHG | DIASTOLIC BLOOD PRESSURE: 90 MMHG | HEART RATE: 89 BPM

## 2024-05-22 DIAGNOSIS — C20 RECTAL CANCER (H): Primary | ICD-10-CM

## 2024-05-22 DIAGNOSIS — R53.0 NEOPLASTIC MALIGNANT RELATED FATIGUE: ICD-10-CM

## 2024-05-22 DIAGNOSIS — R63.4 WEIGHT LOSS: ICD-10-CM

## 2024-05-22 LAB
ALBUMIN SERPL BCG-MCNC: 3.6 G/DL (ref 3.5–5.2)
ALP SERPL-CCNC: 77 U/L (ref 40–150)
ALT SERPL W P-5'-P-CCNC: 28 U/L (ref 0–70)
ANION GAP SERPL CALCULATED.3IONS-SCNC: 11 MMOL/L (ref 7–15)
AST SERPL W P-5'-P-CCNC: 38 U/L (ref 0–45)
BASOPHILS # BLD AUTO: 0 10E3/UL (ref 0–0.2)
BASOPHILS NFR BLD AUTO: 1 %
BILIRUB SERPL-MCNC: 0.9 MG/DL
BUN SERPL-MCNC: 15.7 MG/DL (ref 8–23)
CALCIUM SERPL-MCNC: 8.5 MG/DL (ref 8.8–10.2)
CHLORIDE SERPL-SCNC: 95 MMOL/L (ref 98–107)
CREAT SERPL-MCNC: 0.85 MG/DL (ref 0.67–1.17)
DEPRECATED HCO3 PLAS-SCNC: 25 MMOL/L (ref 22–29)
EGFRCR SERPLBLD CKD-EPI 2021: >90 ML/MIN/1.73M2
EOSINOPHIL # BLD AUTO: 0 10E3/UL (ref 0–0.7)
EOSINOPHIL NFR BLD AUTO: 1 %
ERYTHROCYTE [DISTWIDTH] IN BLOOD BY AUTOMATED COUNT: 17.2 % (ref 10–15)
GLUCOSE SERPL-MCNC: 122 MG/DL (ref 70–99)
HCT VFR BLD AUTO: 33.7 % (ref 40–53)
HGB BLD-MCNC: 12.2 G/DL (ref 13.3–17.7)
IMM GRANULOCYTES # BLD: 0.1 10E3/UL
IMM GRANULOCYTES NFR BLD: 2 %
LYMPHOCYTES # BLD AUTO: 0.3 10E3/UL (ref 0.8–5.3)
LYMPHOCYTES NFR BLD AUTO: 6 %
MCH RBC QN AUTO: 34.6 PG (ref 26.5–33)
MCHC RBC AUTO-ENTMCNC: 36.2 G/DL (ref 31.5–36.5)
MCV RBC AUTO: 96 FL (ref 78–100)
MONOCYTES # BLD AUTO: 1 10E3/UL (ref 0–1.3)
MONOCYTES NFR BLD AUTO: 19 %
NEUTROPHILS # BLD AUTO: 3.8 10E3/UL (ref 1.6–8.3)
NEUTROPHILS NFR BLD AUTO: 72 %
NRBC # BLD AUTO: 0 10E3/UL
NRBC BLD AUTO-RTO: 1 /100
PLATELET # BLD AUTO: 102 10E3/UL (ref 150–450)
POTASSIUM SERPL-SCNC: 3.5 MMOL/L (ref 3.4–5.3)
PROT SERPL-MCNC: 5.2 G/DL (ref 6.4–8.3)
RBC # BLD AUTO: 3.53 10E6/UL (ref 4.4–5.9)
SODIUM SERPL-SCNC: 131 MMOL/L (ref 135–145)
WBC # BLD AUTO: 5.2 10E3/UL (ref 4–11)

## 2024-05-22 PROCEDURE — 99215 OFFICE O/P EST HI 40 MIN: CPT | Performed by: NURSE PRACTITIONER

## 2024-05-22 PROCEDURE — G0463 HOSPITAL OUTPT CLINIC VISIT: HCPCS

## 2024-05-22 PROCEDURE — 85025 COMPLETE CBC W/AUTO DIFF WBC: CPT | Mod: ZL | Performed by: NURSE PRACTITIONER

## 2024-05-22 PROCEDURE — 82374 ASSAY BLOOD CARBON DIOXIDE: CPT | Mod: ZL | Performed by: NURSE PRACTITIONER

## 2024-05-22 PROCEDURE — 82378 CARCINOEMBRYONIC ANTIGEN: CPT | Mod: ZL

## 2024-05-22 PROCEDURE — 82040 ASSAY OF SERUM ALBUMIN: CPT | Mod: ZL | Performed by: NURSE PRACTITIONER

## 2024-05-22 PROCEDURE — 36415 COLL VENOUS BLD VENIPUNCTURE: CPT | Mod: ZL | Performed by: NURSE PRACTITIONER

## 2024-05-22 ASSESSMENT — PAIN SCALES - GENERAL: PAINLEVEL: NO PAIN (0)

## 2024-05-22 NOTE — PROGRESS NOTES
Colon and Rectal Surgery Clinic Note    RE: Nicolas Malone.  : 1950.  ROXIE: 2023.    Reason for visit: rectal cancer.      HPI: Nicolas Malone is a 73 year old male who presents today for rectal cancer. He has no significant past medical history. On 2023 he underwent a colonoscopy for a positive cologaurd test. Colonoscopy demonstrated a rectal mass. Biopsy was positive for moderately differentiated adenocarcinoma, MMR intact. CEA 5.8. CT Chest/Abdomen/Pelvis on 2023 showed two adjacent deep pelvic lymph nodes measuring 6 and 7mm suspicious for ayla disease. MRI rectum on 2023 showed a 2cm rectal mass without evidence of invasion into the perirectal fat or adjacent structures.                                                                     Colonoscopy (2023):  Rectal exam was performed and a mass was palpated at the distal aspect of my finger length or proximally 5 cm.  The colonoscope was inserted into the anus and passed without difficulty to the cecum.  The cecum was identified by the ileocecal valve, the coalescence of the tinea and the appendiceal orifice.  Upon withdrawal all walls of the colon were visualized.  A mass was identified in the rectum.  A large piece was taken using the snare.  Colitis were not seen.colon  Diverticulosis was not seen.  Upon reaching the rectum the scope was retroflexed and internal hemorrhoids  were  seen.  The scope was straightened back out and removed from the patient.  The patient was then taken to the recovery room in stable condition tolerating the procedure well.       Surgical Pathology (2023):  A.  Duodenum, biopsy:  -Duodenal mucosa with no diagnostic abnormality.  B.  Stomach, antrum, biopsy:  -Antral mucosa with no diagnostic abnormality.  C.  Esophagus, distal, biopsy:  -Squamous mucosa with no diagnostic abnormality.  D.  Rectum, lesion, biopsy:  -Invasive moderately-differentiated adenocarcinoma with focal background features  of a tubulovillous adenoma.  -Tumor present at inked deep margin.  -Small separate mucosal fragment with features of a hyperplastic polyp.  -See comment..  Mismatch Repair     Immunohistochemistry (IHC) Testing for Mismatch Repair (MMR) Proteins     MLH1 Result  Intact nuclear expression   Immunohistochemistry (IHC) Testing for Mismatch Repair (MMR) Proteins     MSH2 Result  Intact nuclear expression   Immunohistochemistry (IHC) Testing for Mismatch Repair (MMR) Proteins     MSH6 Result  Intact nuclear expression   Immunohistochemistry (IHC) Testing for Mismatch Repair (MMR) Proteins     PMS2 Result  Intact nuclear expression   Immunohistochemistry (IHC) Testing for Mismatch Repair (MMR) Proteins  Background nonneoplastic tissue / internal control with intact nuclear expression   IHC Interpretation  No loss of nuclear expression of MMR proteins: low probability of MSI-H       CEA (11/22/2023): 5.8     CT Chest/Abdomen/Pelvis (11/28/2023):  IMPRESSION:     Left posterior rectal mucosal mass measuring up to 26 mm.    2 adjacent deep pelvic lymph nodes measure 7 and 6 mm, with rounded  contour, nonspecific, but suspicious for local ayla involvement.   Multiple small pulmonary nodules are highly nonspecific.    MRI Rectum (11/28/2023):                                IMPRESSION:  2 cm rectal mass without evidence of invasion into the perirectal fat or adjacent structures.  Two subcentimeter deep left pelvic lymph nodes are nonspecific.    Interval history:  He underwent concurrent chemoradiation 12/28/2023 to 2/6/2024 with with capecitabine     CT A/P 2/12/24:  Asymmetric wall thickening of the rectum appears worse when compared to prior study. Previous a, only the left dorsolateral aspect of the rectum was previously involved. Currently, the dorsal and lateral aspects are not thickened. It is unclear if this is related to progression of disease and/or related to reactive changes from therapy. Slight interval decrease  in the size of a 6.7 mm normal-sized lymph node in the left parasagittal presacral soft tissues, previously measuring 8.8 mm. 13 x 9 mm enlarged lymph node lying along the posterior aspect of the distal esophagus appears new when compared to the prior examination. The clinical significance of this finding is unclear. At least one of the lymph nodes located previously along the lesser curvature of the stomach is decreased in size as described above. Other lymph nodes in this region are normal in size and stable in appearance. Unchanged mesenteric panniculitis.     He began systemic chemotherapy with CapeOx on 2/23/24- he developed Covid after the first cycle so cycle 2 was delayed. Planning 4 cycles total (12 weeks) then discuss surgery    CT A/P 5/17/24:  IMPRESSION:    Most likely post-therapeutic diffuse mucosal edema of the rectum.  Resolved lymph nodes in the mesorectal fat. No new suspicious  adenopathy. No evidence of new distant metastatic disease. Continued  follow-up is requested.    MRI Rectum 6/13/24IMPRESSION:   1. There has been a positive response to treatment, with a corresponding tumor regression grade of mrTRG-2 .  The measurable mass has almost completely resolved with some minimal residual tumor signal suspected on the background of rectal wall edema.  2. Previously measured lymph nodes have decreased in size, now not enlarged by size criteria.  3. Wall thickening with marked edema of the rectosigmoid colon and adjacent pericolonic and pelvic fat with small volume of ascites in the pelvis, presumably related to chemoradiation therapy.  4. Circumferential wall thickening of the bladder noted. This may be part due to degree of distention however could also be related to chemotherapy radiation therapy.                Component  Ref Range & Units 2 wk ago  (5/3/24) 1 mo ago  (4/9/24) 2 mo ago  (3/22/24) 2 mo ago  (2/23/24) 3 mo ago  (1/25/24) 6 mo ago  (11/22/23)    CEA  ng/mL 4.4 4.2 CM 4.2 CM 2.5 CM  5.3 CM 5.8 CM        Medical history:  -none    Surgical history:  -none    Family history:  No Hx of IBD or GI malignancy    Medications:  Current Outpatient Medications   Medication Sig Dispense Refill    [START ON 5/24/2024] capecitabine (XELODA) 500 MG tablet Take 4 tablets (2,000 mg) by mouth 2 times daily for 14 days Take on Days 1 through 14, then off for 7 days. 112 tablet 0    capecitabine (XELODA) 500 MG tablet Take 4 tablets (2,000 mg) by mouth 2 times daily for 14 days Take on Days 1 through 14, then off for 7 days. 112 tablet 0    dexAMETHasone (DECADRON) 4 MG tablet Take 2 tablets (8 mg) by mouth daily Take for 2 days, starting the day after chemo. Take with food. 4 tablet 1    ibuprofen (ADVIL/MOTRIN) 200 MG tablet Take 200 mg by mouth daily (Patient not taking: Reported on 4/9/2024)      loperamide (IMODIUM) 2 MG capsule Start with 2 caps (4 mg), then take one cap (2 mg) after each diarrheal stool as needed. Do not use more than 8 caps (16 mg) per day. 30 capsule 0    ondansetron (ZOFRAN) 8 MG tablet Take 1 tablet (8 mg) by mouth every 8 hours as needed for nausea (vomiting) (Patient not taking: Reported on 4/9/2024) 30 tablet 2    potassium chloride ER (K-TAB) 20 MEQ CR tablet Take one tablet when you  prescription. Take one tablet by mouth starting the next morning. 60 tablet 1    prochlorperazine (COMPAZINE) 10 MG tablet Take 1 tablet (10 mg) by mouth every 6 hours as needed for nausea or vomiting (Patient not taking: Reported on 3/15/2024) 30 tablet 2    sildenafil (VIAGRA) 100 MG tablet  (Patient not taking: Reported on 4/9/2024)      triamterene-HCTZ (DYAZIDE) 37.5-25 MG capsule Take 1 capsule by mouth every morning      urea (CARMOL) 10 % external cream Apply topically as needed for dry skin (Patient not taking: Reported on 4/9/2024) 85 g 1       Allergies:  Allergies   Allergen Reactions    Penicillins      Childhood       Social history:   Social History     Tobacco Use    Smoking  status: Never    Smokeless tobacco: Former     Quit date: 1990    Tobacco comments:     Chewed for 10-15 years.    Substance Use Topics    Alcohol use: Not Currently     Comment: rarely     Marital status: unknown.    ROS:  A complete review of systems was performed with the patient and all systems negative except as per HPI.    Physical Examination:  Exam was chaperoned by Joanna Meredith CMA  There were no vitals taken for this visit.  General: Well hydrated. No acute distress.  Abdomen: Soft, NT, ND, well healed midline incision from prior trauma.. No inguinal adenopathy palpated.  Perianal external examination:  Perianal skin: intact.  Lesions: No.  Eversion of buttocks: There was not evidence of an anal fissure. Details: N/A.  Skin tags or external hemorrhoids: None.  Digital rectal examination: Was performed.   Sphincter tone: Good.  Palpable lesions: No.  Other: None.  Bimanual examination: was not performed.    Anoscopy: Was performed.   Hemorrhoids: No significant internal hemorrhoids.  Lesions: No    Procedures:  Prior to the start of the procedure and with procedural staff participation, I verbally confirmed the patient s identity using two indicators, relevant allergies, that the procedure was appropriate and matched the consent or emergent situation, and that the correct equipment/implants were available. Immediately prior to starting the procedure I conducted the Time Out with the procedural staff and re-confirmed the patient s name, procedure, and site/side. (The Joint Commission universal protocol was followed.)  Yes    Sedation (Moderate or Deep): None    Flexible sigmoidoscopy was performed to 30 cm. No evidence of any residual tumor present, passed multiple times through rectum. Pictures taken and uploaded.    ASSESSMENT  1. Rectal cancer  2. Hypertension    PLAN  1. Will add to tumor board.  2. Repeat flex sig and MRI in 8 weeks for repeat evaluation, since chemo (cycle 4) was completed 3 weeks  ago, cut short due to poor chemo tolerance.     Risks, benefits, and alternatives of operative treatment were thoroughly discussed with the patient, he understands these well and agrees to proceed.    Time spent: 45 minutes, an additional 15 minutes were spent doing flex sig procedure.  >50% spent in discussing, counseling and coordinating care.    Tommy Rodriguez M.D    Division of Colon and Rectal Surgery  [facility]    Referring Provider:  Greg Waddell MD  [address] [place] [address]     Primary Care Provider:  No Ref-Primary, Physician

## 2024-05-22 NOTE — NURSING NOTE
"Oncology Rooming Note    May 22, 2024 2:39 PM   Nicolas Malone is a 73 year old male who presents for:    Chief Complaint   Patient presents with    Oncology Clinic Visit     Follow up. Rectal cancer.     Initial Vitals: BP (!) 152/90 (BP Location: Left arm, Patient Position: Sitting, Cuff Size: Adult Regular)   Pulse 89   Temp 97.8  F (36.6  C) (Tympanic)   Ht 1.702 m (5' 7\")   Wt 86.3 kg (190 lb 4.1 oz)   SpO2 98%   BMI 29.80 kg/m   Estimated body mass index is 29.8 kg/m  as calculated from the following:    Height as of this encounter: 1.702 m (5' 7\").    Weight as of this encounter: 86.3 kg (190 lb 4.1 oz). Body surface area is 2.02 meters squared.  No Pain (0) Comment: Data Unavailable   No LMP for male patient.  Allergies reviewed: Yes  Medications reviewed: Yes    Medications: Medication refills not needed today.  Pharmacy name entered into [facility]:    [id number] [place], [place] - [address]  [facility] [facility] [address]  [facility] - [place], [place] - [address]    Frailty Screening:   Is the patient here for a new oncology consult visit in cancer care? 2. No      Clinical concerns: none       Camila Allen LPN              "

## 2024-05-23 ENCOUNTER — INFUSION THERAPY VISIT (OUTPATIENT)
Dept: INFUSION THERAPY | Facility: OTHER | Age: 74
End: 2024-05-23
Attending: NURSE PRACTITIONER
Payer: MEDICARE

## 2024-05-23 VITALS
HEART RATE: 83 BPM | HEIGHT: 67 IN | SYSTOLIC BLOOD PRESSURE: 145 MMHG | TEMPERATURE: 98.2 F | WEIGHT: 190 LBS | BODY MASS INDEX: 29.82 KG/M2 | DIASTOLIC BLOOD PRESSURE: 86 MMHG | OXYGEN SATURATION: 97 %

## 2024-05-23 DIAGNOSIS — C20 RECTAL CANCER (H): Primary | ICD-10-CM

## 2024-05-23 PROCEDURE — 258N000003 HC RX IP 258 OP 636: Performed by: INTERNAL MEDICINE

## 2024-05-23 PROCEDURE — 96360 HYDRATION IV INFUSION INIT: CPT

## 2024-05-23 RX ADMIN — SODIUM CHLORIDE 1000 ML: 9 INJECTION, SOLUTION INTRAVENOUS at 10:50

## 2024-05-23 ASSESSMENT — PAIN SCALES - GENERAL: PAINLEVEL: NO PAIN (0)

## 2024-05-24 LAB — CEA SERPL-MCNC: 3.5 NG/ML

## 2024-05-28 ENCOUNTER — PATIENT OUTREACH (OUTPATIENT)
Dept: ONCOLOGY | Facility: OTHER | Age: 74
End: 2024-05-28

## 2024-05-28 NOTE — PROGRESS NOTES
[facility]: Cancer Care                                                                                        Patient called and reported that he is feeling so much better. Has regained his strength and is eating like a horse. He does not want to come in for fluids this week or meet with nutrition. We will keep appointment for next week and cancel as needed. Patient agrees with this plan of care.       Signature:  Analisa Holguin RN

## 2024-06-06 ENCOUNTER — LAB (OUTPATIENT)
Dept: LAB | Facility: OTHER | Age: 74
End: 2024-06-06
Payer: MEDICARE

## 2024-06-06 DIAGNOSIS — C20 RECTAL CANCER (H): ICD-10-CM

## 2024-06-06 LAB
ALBUMIN SERPL BCG-MCNC: 3.5 G/DL (ref 3.5–5.2)
ALP SERPL-CCNC: 76 U/L (ref 40–150)
ALT SERPL W P-5'-P-CCNC: 24 U/L (ref 0–70)
ANION GAP SERPL CALCULATED.3IONS-SCNC: 10 MMOL/L (ref 7–15)
AST SERPL W P-5'-P-CCNC: 40 U/L (ref 0–45)
BASOPHILS # BLD AUTO: 0.1 10E3/UL (ref 0–0.2)
BASOPHILS NFR BLD AUTO: 2 %
BILIRUB SERPL-MCNC: 0.5 MG/DL
BUN SERPL-MCNC: 10.2 MG/DL (ref 8–23)
CALCIUM SERPL-MCNC: 8.8 MG/DL (ref 8.8–10.2)
CHLORIDE SERPL-SCNC: 102 MMOL/L (ref 98–107)
CREAT SERPL-MCNC: 0.97 MG/DL (ref 0.67–1.17)
DEPRECATED HCO3 PLAS-SCNC: 25 MMOL/L (ref 22–29)
EGFRCR SERPLBLD CKD-EPI 2021: 83 ML/MIN/1.73M2
EOSINOPHIL # BLD AUTO: 0.1 10E3/UL (ref 0–0.7)
EOSINOPHIL NFR BLD AUTO: 4 %
ERYTHROCYTE [DISTWIDTH] IN BLOOD BY AUTOMATED COUNT: 16.3 % (ref 10–15)
GLUCOSE SERPL-MCNC: 126 MG/DL (ref 70–99)
HCT VFR BLD AUTO: 33.4 % (ref 40–53)
HGB BLD-MCNC: 11.5 G/DL (ref 13.3–17.7)
IMM GRANULOCYTES # BLD: 0 10E3/UL
IMM GRANULOCYTES NFR BLD: 0 %
LYMPHOCYTES # BLD AUTO: 0.4 10E3/UL (ref 0.8–5.3)
LYMPHOCYTES NFR BLD AUTO: 16 %
MAGNESIUM SERPL-MCNC: 1.8 MG/DL (ref 1.7–2.3)
MCH RBC QN AUTO: 35.2 PG (ref 26.5–33)
MCHC RBC AUTO-ENTMCNC: 34.4 G/DL (ref 31.5–36.5)
MCV RBC AUTO: 102 FL (ref 78–100)
MONOCYTES # BLD AUTO: 0.5 10E3/UL (ref 0–1.3)
MONOCYTES NFR BLD AUTO: 20 %
NEUTROPHILS # BLD AUTO: 1.6 10E3/UL (ref 1.6–8.3)
NEUTROPHILS NFR BLD AUTO: 58 %
NRBC # BLD AUTO: 0 10E3/UL
NRBC BLD AUTO-RTO: 0 /100
PLATELET # BLD AUTO: 130 10E3/UL (ref 150–450)
POTASSIUM SERPL-SCNC: 3.7 MMOL/L (ref 3.4–5.3)
PROT SERPL-MCNC: 5.5 G/DL (ref 6.4–8.3)
RBC # BLD AUTO: 3.27 10E6/UL (ref 4.4–5.9)
SODIUM SERPL-SCNC: 137 MMOL/L (ref 135–145)
WBC # BLD AUTO: 2.7 10E3/UL (ref 4–11)

## 2024-06-06 PROCEDURE — 85025 COMPLETE CBC W/AUTO DIFF WBC: CPT | Mod: ZL

## 2024-06-06 PROCEDURE — 36415 COLL VENOUS BLD VENIPUNCTURE: CPT | Mod: ZL

## 2024-06-06 PROCEDURE — 82378 CARCINOEMBRYONIC ANTIGEN: CPT | Mod: ZL

## 2024-06-06 PROCEDURE — 80053 COMPREHEN METABOLIC PANEL: CPT | Mod: ZL

## 2024-06-06 PROCEDURE — 83735 ASSAY OF MAGNESIUM: CPT | Mod: ZL

## 2024-06-07 ENCOUNTER — ONCOLOGY VISIT (OUTPATIENT)
Dept: ONCOLOGY | Facility: OTHER | Age: 74
End: 2024-06-07
Attending: NURSE PRACTITIONER
Payer: MEDICARE

## 2024-06-07 VITALS
TEMPERATURE: 96.5 F | DIASTOLIC BLOOD PRESSURE: 78 MMHG | OXYGEN SATURATION: 97 % | RESPIRATION RATE: 20 BRPM | HEIGHT: 67 IN | BODY MASS INDEX: 30.28 KG/M2 | SYSTOLIC BLOOD PRESSURE: 140 MMHG | WEIGHT: 192.9 LBS | HEART RATE: 78 BPM

## 2024-06-07 DIAGNOSIS — M62.81 MUSCLE WEAKNESS (GENERALIZED): ICD-10-CM

## 2024-06-07 DIAGNOSIS — T45.1X5A ANTINEOPLASTIC CHEMOTHERAPY INDUCED PANCYTOPENIA (H): ICD-10-CM

## 2024-06-07 DIAGNOSIS — C20 RECTAL CANCER (H): Primary | ICD-10-CM

## 2024-06-07 DIAGNOSIS — D61.810 ANTINEOPLASTIC CHEMOTHERAPY INDUCED PANCYTOPENIA (H): ICD-10-CM

## 2024-06-07 PROCEDURE — G0463 HOSPITAL OUTPT CLINIC VISIT: HCPCS

## 2024-06-07 PROCEDURE — 99215 OFFICE O/P EST HI 40 MIN: CPT | Performed by: NURSE PRACTITIONER

## 2024-06-07 RX ORDER — AMOXICILLIN 250 MG
1 CAPSULE ORAL 2 TIMES DAILY
COMMUNITY

## 2024-06-07 ASSESSMENT — PAIN SCALES - GENERAL: PAINLEVEL: NO PAIN (0)
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
## 2024-06-07 NOTE — NURSING NOTE
"Oncology Rooming Note    June 7, 2024 9:03 AM   Nicolas Malone is a 73 year old male who presents for:    Chief Complaint   Patient presents with    Oncology Clinic Visit     Follow up Rectal cancer        Initial Vitals: BP (!) 140/78   Pulse 78   Temp (!) 96.5  F (35.8  C) (Tympanic)   Resp 20   Ht 1.702 m (5' 7\")   Wt 87.5 kg (192 lb 14.4 oz)   SpO2 97%   BMI 30.21 kg/m   Estimated body mass index is 30.21 kg/m  as calculated from the following:    Height as of this encounter: 1.702 m (5' 7\").    Weight as of this encounter: 87.5 kg (192 lb 14.4 oz). Body surface area is 2.03 meters squared.  No Pain (0) Comment: Data Unavailable   No LMP for male patient.  Allergies reviewed: Yes  Medications reviewed: Yes    Medications: Medication refills not needed today.  Pharmacy name entered into [facility]:    [id number] [place], [place] - [address]  AT [facility] [address]  [facility] - [place], [place] - [address]    Frailty Screening:   Is the patient here for a new oncology consult visit in cancer care? 2. No            Dorinda Dick LPN             "

## 2024-06-08 LAB — CEA SERPL-MCNC: 3.9 NG/ML

## 2024-06-13 ENCOUNTER — ANCILLARY PROCEDURE (OUTPATIENT)
Dept: MRI IMAGING | Facility: CLINIC | Age: 74
End: 2024-06-13
Attending: SURGERY
Payer: MEDICARE

## 2024-06-13 ENCOUNTER — OFFICE VISIT (OUTPATIENT)
Dept: SURGERY | Facility: CLINIC | Age: 74
End: 2024-06-13
Payer: MEDICARE

## 2024-06-13 VITALS
WEIGHT: 187.1 LBS | OXYGEN SATURATION: 98 % | HEART RATE: 86 BPM | BODY MASS INDEX: 29.37 KG/M2 | SYSTOLIC BLOOD PRESSURE: 143 MMHG | DIASTOLIC BLOOD PRESSURE: 79 MMHG | HEIGHT: 67 IN

## 2024-06-13 DIAGNOSIS — C20 RECTAL CANCER (H): Primary | ICD-10-CM

## 2024-06-13 DIAGNOSIS — C20 RECTAL CANCER (H): ICD-10-CM

## 2024-06-13 PROCEDURE — 99214 OFFICE O/P EST MOD 30 MIN: CPT | Performed by: SURGERY

## 2024-06-13 PROCEDURE — A9585 GADOBUTROL INJECTION: HCPCS | Mod: JZ | Performed by: RADIOLOGY

## 2024-06-13 PROCEDURE — 74183 MRI ABD W/O CNTR FLWD CNTR: CPT | Mod: MG | Performed by: RADIOLOGY

## 2024-06-13 PROCEDURE — G1010 CDSM STANSON: HCPCS | Performed by: RADIOLOGY

## 2024-06-13 RX ORDER — GADOBUTROL 604.72 MG/ML
10 INJECTION INTRAVENOUS ONCE
Status: COMPLETED | OUTPATIENT
Start: 2024-06-13 | End: 2024-06-13

## 2024-06-13 RX ADMIN — GADOBUTROL 8.5 ML: 604.72 INJECTION INTRAVENOUS at 09:40

## 2024-06-13 ASSESSMENT — PAIN SCALES - GENERAL: PAINLEVEL: NO PAIN (0)

## 2024-06-13 NOTE — LETTER
2024       RE: Nicolas Malone  [address] [facility] [address]     Dear Colleague,    Thank you for referring your patient, Nicolas Malone, to the [facility] RE[facility] [place] [facility]. Please see a copy of my visit note below.    Colon and Rectal Surgery Clinic Note    RE: Nicolas Malone.  : 1950.  ROXIE: 2023.    Reason for visit: rectal cancer.      HPI: Nicolas Malone is a 73 year old male who presents today for rectal cancer. He has no significant past medical history. On 2023 he underwent a colonoscopy for a positive cologaurd test. Colonoscopy demonstrated a rectal mass. Biopsy was positive for moderately differentiated adenocarcinoma, MMR intact. CEA 5.8. CT Chest/Abdomen/Pelvis on 2023 showed two adjacent deep pelvic lymph nodes measuring 6 and 7mm suspicious for ayla disease. MRI rectum on 2023 showed a 2cm rectal mass without evidence of invasion into the perirectal fat or adjacent structures.                                                                     Colonoscopy (2023):  Rectal exam was performed and a mass was palpated at the distal aspect of my finger length or proximally 5 cm.  The colonoscope was inserted into the anus and passed without difficulty to the cecum.  The cecum was identified by the ileocecal valve, the coalescence of the tinea and the appendiceal orifice.  Upon withdrawal all walls of the colon were visualized.  A mass was identified in the rectum.  A large piece was taken using the snare.  Colitis were not seen.colon  Diverticulosis was not seen.  Upon reaching the rectum the scope was retroflexed and internal hemorrhoids  were  seen.  The scope was straightened back out and removed from the patient.  The patient was then taken to the recovery room in stable condition tolerating the procedure well.       Surgical Pathology (2023):  A.  Duodenum,  biopsy:  -Duodenal mucosa with no diagnostic abnormality.  B.  Stomach, antrum, biopsy:  -Antral mucosa with no diagnostic abnormality.  C.  Esophagus, distal, biopsy:  -Squamous mucosa with no diagnostic abnormality.  D.  Rectum, lesion, biopsy:  -Invasive moderately-differentiated adenocarcinoma with focal background features of a tubulovillous adenoma.  -Tumor present at inked deep margin.  -Small separate mucosal fragment with features of a hyperplastic polyp.  -See comment..  Mismatch Repair     Immunohistochemistry (IHC) Testing for Mismatch Repair (MMR) Proteins     MLH1 Result  Intact nuclear expression   Immunohistochemistry (IHC) Testing for Mismatch Repair (MMR) Proteins     MSH2 Result  Intact nuclear expression   Immunohistochemistry (IHC) Testing for Mismatch Repair (MMR) Proteins     MSH6 Result  Intact nuclear expression   Immunohistochemistry (IHC) Testing for Mismatch Repair (MMR) Proteins     PMS2 Result  Intact nuclear expression   Immunohistochemistry (IHC) Testing for Mismatch Repair (MMR) Proteins  Background nonneoplastic tissue / internal control with intact nuclear expression   IHC Interpretation  No loss of nuclear expression of MMR proteins: low probability of MSI-H       CEA (11/22/2023): 5.8     CT Chest/Abdomen/Pelvis (11/28/2023):  IMPRESSION:     Left posterior rectal mucosal mass measuring up to 26 mm.    2 adjacent deep pelvic lymph nodes measure 7 and 6 mm, with rounded  contour, nonspecific, but suspicious for local ayla involvement.   Multiple small pulmonary nodules are highly nonspecific.    MRI Rectum (11/28/2023):                                IMPRESSION:  2 cm rectal mass without evidence of invasion into the perirectal fat or adjacent structures.  Two subcentimeter deep left pelvic lymph nodes are nonspecific.    Interval history:  He underwent concurrent chemoradiation 12/28/2023 to 2/6/2024 with with capecitabine     CT A/P 2/12/24:  Asymmetric wall thickening of the  rectum appears worse when compared to prior study. Previous a, only the left dorsolateral aspect of the rectum was previously involved. Currently, the dorsal and lateral aspects are not thickened. It is unclear if this is related to progression of disease and/or related to reactive changes from therapy. Slight interval decrease in the size of a 6.7 mm normal-sized lymph node in the left parasagittal presacral soft tissues, previously measuring 8.8 mm. 13 x 9 mm enlarged lymph node lying along the posterior aspect of the distal esophagus appears new when compared to the prior examination. The clinical significance of this finding is unclear. At least one of the lymph nodes located previously along the lesser curvature of the stomach is decreased in size as described above. Other lymph nodes in this region are normal in size and stable in appearance. Unchanged mesenteric panniculitis.     He began systemic chemotherapy with CapeOx on 2/23/24- he developed Covid after the first cycle so cycle 2 was delayed. Planning 4 cycles total (12 weeks) then discuss surgery    CT A/P 5/17/24:  IMPRESSION:    Most likely post-therapeutic diffuse mucosal edema of the rectum.  Resolved lymph nodes in the mesorectal fat. No new suspicious  adenopathy. No evidence of new distant metastatic disease. Continued  follow-up is requested.    MRI Rectum 6/13/24IMPRESSION:   1. There has been a positive response to treatment, with a corresponding tumor regression grade of mrTRG-2 .  The measurable mass has almost completely resolved with some minimal residual tumor signal suspected on the background of rectal wall edema.  2. Previously measured lymph nodes have decreased in size, now not enlarged by size criteria.  3. Wall thickening with marked edema of the rectosigmoid colon and adjacent pericolonic and pelvic fat with small volume of ascites in the pelvis, presumably related to chemoradiation therapy.  4. Circumferential wall thickening of  the bladder noted. This may be part due to degree of distention however could also be related to chemotherapy radiation therapy.                Component  Ref Range & Units 2 wk ago  (5/3/24) 1 mo ago  (4/9/24) 2 mo ago  (3/22/24) 2 mo ago  (2/23/24) 3 mo ago  (1/25/24) 6 mo ago  (11/22/23)    CEA  ng/mL 4.4 4.2 CM 4.2 CM 2.5 CM 5.3 CM 5.8 CM        Medical history:  -none    Surgical history:  -none    Family history:  No Hx of IBD or GI malignancy    Medications:  Current Outpatient Medications   Medication Sig Dispense Refill    [START ON 5/24/2024] capecitabine (XELODA) 500 MG tablet Take 4 tablets (2,000 mg) by mouth 2 times daily for 14 days Take on Days 1 through 14, then off for 7 days. 112 tablet 0    capecitabine (XELODA) 500 MG tablet Take 4 tablets (2,000 mg) by mouth 2 times daily for 14 days Take on Days 1 through 14, then off for 7 days. 112 tablet 0    dexAMETHasone (DECADRON) 4 MG tablet Take 2 tablets (8 mg) by mouth daily Take for 2 days, starting the day after chemo. Take with food. 4 tablet 1    ibuprofen (ADVIL/MOTRIN) 200 MG tablet Take 200 mg by mouth daily (Patient not taking: Reported on 4/9/2024)      loperamide (IMODIUM) 2 MG capsule Start with 2 caps (4 mg), then take one cap (2 mg) after each diarrheal stool as needed. Do not use more than 8 caps (16 mg) per day. 30 capsule 0    ondansetron (ZOFRAN) 8 MG tablet Take 1 tablet (8 mg) by mouth every 8 hours as needed for nausea (vomiting) (Patient not taking: Reported on 4/9/2024) 30 tablet 2    potassium chloride ER (K-TAB) 20 MEQ CR tablet Take one tablet when you  prescription. Take one tablet by mouth starting the next morning. 60 tablet 1    prochlorperazine (COMPAZINE) 10 MG tablet Take 1 tablet (10 mg) by mouth every 6 hours as needed for nausea or vomiting (Patient not taking: Reported on 3/15/2024) 30 tablet 2    sildenafil (VIAGRA) 100 MG tablet  (Patient not taking: Reported on 4/9/2024)      triamterene-HCTZ (DYAZIDE)  37.5-25 MG capsule Take 1 capsule by mouth every morning      urea (CARMOL) 10 % external cream Apply topically as needed for dry skin (Patient not taking: Reported on 4/9/2024) 85 g 1       Allergies:  Allergies   Allergen Reactions    Penicillins      Childhood       Social history:   Social History     Tobacco Use    Smoking status: Never    Smokeless tobacco: Former     Quit date: 1990    Tobacco comments:     Chewed for 10-15 years.    Substance Use Topics    Alcohol use: Not Currently     Comment: rarely     Marital status: unknown.    ROS:  A complete review of systems was performed with the patient and all systems negative except as per HPI.    Physical Examination:  Exam was chaperoned by Joanna Meredith CMA  There were no vitals taken for this visit.  General: Well hydrated. No acute distress.  Abdomen: Soft, NT, ND, well healed midline incision from prior trauma.. No inguinal adenopathy palpated.  Perianal external examination:  Perianal skin: intact.  Lesions: No.  Eversion of buttocks: There was not evidence of an anal fissure. Details: N/A.  Skin tags or external hemorrhoids: None.  Digital rectal examination: Was performed.   Sphincter tone: Good.  Palpable lesions: No.  Other: None.  Bimanual examination: was not performed.    Anoscopy: Was performed.   Hemorrhoids: No significant internal hemorrhoids.  Lesions: No    Procedures:  Prior to the start of the procedure and with procedural staff participation, I verbally confirmed the patient s identity using two indicators, relevant allergies, that the procedure was appropriate and matched the consent or emergent situation, and that the correct equipment/implants were available. Immediately prior to starting the procedure I conducted the Time Out with the procedural staff and re-confirmed the patient s name, procedure, and site/side. (The Joint Commission universal protocol was followed.)  Yes    Sedation (Moderate or Deep): None    Flexible  sigmoidoscopy was performed to 30 cm. No evidence of any residual tumor present, passed multiple times through rectum. Pictures taken and uploaded.    ASSESSMENT  1. Rectal cancer  2. Hypertension    PLAN  1. Will add to tumor board.  2. Repeat flex sig and MRI in 8 weeks for repeat evaluation, since chemo (cycle 4) was completed 3 weeks ago, cut short due to poor chemo tolerance.     Risks, benefits, and alternatives of operative treatment were thoroughly discussed with the patient, he understands these well and agrees to proceed.    Time spent: 45 minutes, an additional 15 minutes were spent doing flex sig procedure.  >50% spent in discussing, counseling and coordinating care.      Referring Provider:  Greg Waddell MD  [address] [place] [id number]     Primary Care Provider:  No Ref-Primary, Physician      Again, thank you for allowing me to participate in the care of your patient.      Sincerely,    Tommy Rodriguez MD, MD

## 2024-06-13 NOTE — NURSING NOTE
"Chief Complaint   Patient presents with    Flexible Sigmoidoscopy       Vitals:    06/13/24 1128   BP: (!) 143/79   BP Location: Left arm   Patient Position: Sitting   Cuff Size: Adult Regular   Pulse: 86   SpO2: 98%   Weight: 187 lb 1.6 oz   Height: 5' 7\"       Body mass index is 29.3 kg/m .    Joanna Meredith CMA    "

## 2024-06-13 NOTE — PATIENT INSTRUCTIONS
Follow up:  Flex sig in 2 months  MRI in 2 months      Please call with any questions or concerns regarding your clinic visit today.    It is a pleasure being involved in your health care.    Contacts post-consultation depending on your need:    Radiology Appointments [phone]    Schedule Clinic Appointments [phone] # 1   M-F 7:30 - 5 pm    [facility] T[facility] [phone]    Clinic Fax Number [phone]    Surgery Scheduling [phone]    My Chart is available 24 hours a day and is a secure way to access your records and communicate with your care team.  I strongly recommend signing up if you haven't already done so, if you are comfortable with computers.  If you would like to inquire about this or are having problems with My Chart access, you may call [phone] or go online at [email or web address].  Please allow at least 24 hours for a response and extra time on weekends and Holidays.

## 2024-06-17 ENCOUNTER — TUMOR CONFERENCE (OUTPATIENT)
Dept: ONCOLOGY | Facility: CLINIC | Age: 74
End: 2024-06-17
Payer: MEDICARE

## 2024-06-17 NOTE — TUMOR CONFERENCE
Tumor Conference Information  Tumor Conference: Colorectal  Specialties Present: Medical oncology, Radiation oncology, Pathology, Radiology, Surgery  Patient Status: Prospective  Stage: rectal cancer with complete endoscopic response.  Treatment to Date: Chemotherapy  Clinical Trials: Discussed (see comment)  Genetic Testing Discussed/Recommended?: No  Supportive Care Services Discussed/Recommended?: Yes  Recommended Plan: Follows evidence-based guidelines (Comment: w/w)  Did the review exceed 30 minutes?: did not           Documentation / Disclaimer Cancer Tumor Board Note  Cancer tumor board recommendations do not override what is determined to be reasonable care and treatment, which is dependent on the circumstances of a patient's case; the patient's medical, social, and personal concerns; and the clinical judgment of the oncologist [physician].

## 2024-07-09 ENCOUNTER — TELEPHONE (OUTPATIENT)
Dept: SURGERY | Facility: CLINIC | Age: 74
End: 2024-07-09
Payer: MEDICARE

## 2024-07-09 NOTE — TELEPHONE ENCOUNTER
Left Voicemail (1st Attempt), sent myc for the patient to call back and reschedule the following:    Appointment type: Return Patient  Provider: Dr. Rodriguez  Return date: Reschedule 8/22 Appt due to the provider being out  Specialty phone number: [phone]  Additional appointment(s) needed: Reschedule pt's MRI on 8/22 to 8/29 as well, use [phone] hold at the [facility]  Additonal Notes: reschedule to 8/29 with Dr. Rodriguez, use any hold      Left direct #

## 2024-07-09 NOTE — TELEPHONE ENCOUNTER
Patient confirmed scheduled appointment:  Date: 8/29/24  Time: 10:30 am  Visit type: Return Patient  Provider: Dr. Rodriguez  Location: [facility]  Testing/imaging: 3T MRI also rescheduled to 8/29 at 745 am at the [facility]  Additional notes: n/a

## 2024-08-06 NOTE — PROGRESS NOTES
Colon and Rectal Surgery Clinic Note    RE: Nicolas Malone.  : 1950.  ROXIE: 24.    Reason for visit: rectal cancer follow up W and W.      HPI: Nicolas Malone is a 73 year old male who presents today for rectal cancer. He has no significant past medical history. On 2023 he underwent a colonoscopy for a positive cologaurd test. Colonoscopy demonstrated a rectal mass. Biopsy was positive for moderately differentiated adenocarcinoma, MMR intact. CEA 5.8. CT Chest/Abdomen/Pelvis on 2023 showed two adjacent deep pelvic lymph nodes measuring 6 and 7mm suspicious for ayla disease. MRI rectum on 2023 showed a 2cm rectal mass without evidence of invasion into the perirectal fat or adjacent structures.                                                                     Colonoscopy (2023):  Rectal exam was performed and a mass was palpated at the distal aspect of my finger length or proximally 5 cm.  The colonoscope was inserted into the anus and passed without difficulty to the cecum.  The cecum was identified by the ileocecal valve, the coalescence of the tinea and the appendiceal orifice.  Upon withdrawal all walls of the colon were visualized.  A mass was identified in the rectum.  A large piece was taken using the snare.  Colitis were not seen.colon  Diverticulosis was not seen.  Upon reaching the rectum the scope was retroflexed and internal hemorrhoids  were  seen.  The scope was straightened back out and removed from the patient.  The patient was then taken to the recovery room in stable condition tolerating the procedure well.       Surgical Pathology (2023):  A.  Duodenum, biopsy:  -Duodenal mucosa with no diagnostic abnormality.  B.  Stomach, antrum, biopsy:  -Antral mucosa with no diagnostic abnormality.  C.  Esophagus, distal, biopsy:  -Squamous mucosa with no diagnostic abnormality.  D.  Rectum, lesion, biopsy:  -Invasive moderately-differentiated adenocarcinoma with focal  background features of a tubulovillous adenoma.  -Tumor present at inked deep margin.  -Small separate mucosal fragment with features of a hyperplastic polyp.  -See comment..  Mismatch Repair     Immunohistochemistry (IHC) Testing for Mismatch Repair (MMR) Proteins     MLH1 Result  Intact nuclear expression   Immunohistochemistry (IHC) Testing for Mismatch Repair (MMR) Proteins     MSH2 Result  Intact nuclear expression   Immunohistochemistry (IHC) Testing for Mismatch Repair (MMR) Proteins     MSH6 Result  Intact nuclear expression   Immunohistochemistry (IHC) Testing for Mismatch Repair (MMR) Proteins     PMS2 Result  Intact nuclear expression   Immunohistochemistry (IHC) Testing for Mismatch Repair (MMR) Proteins  Background nonneoplastic tissue / internal control with intact nuclear expression   IHC Interpretation  No loss of nuclear expression of MMR proteins: low probability of MSI-H       CEA (11/22/2023): 5.8     CT Chest/Abdomen/Pelvis (11/28/2023):  IMPRESSION:     Left posterior rectal mucosal mass measuring up to 26 mm.    2 adjacent deep pelvic lymph nodes measure 7 and 6 mm, with rounded  contour, nonspecific, but suspicious for local ayla involvement.   Multiple small pulmonary nodules are highly nonspecific.    MRI Rectum (11/28/2023):                                IMPRESSION:  2 cm rectal mass without evidence of invasion into the perirectal fat or adjacent structures.  Two subcentimeter deep left pelvic lymph nodes are nonspecific.    Interval history:  He underwent concurrent chemoradiation 12/28/2023 to 2/6/2024 with with capecitabine     CT A/P 2/12/24:  Asymmetric wall thickening of the rectum appears worse when compared to prior study. Previous a, only the left dorsolateral aspect of the rectum was previously involved. Currently, the dorsal and lateral aspects are not thickened. It is unclear if this is related to progression of disease and/or related to reactive changes from therapy. Slight  interval decrease in the size of a 6.7 mm normal-sized lymph node in the left parasagittal presacral soft tissues, previously measuring 8.8 mm. 13 x 9 mm enlarged lymph node lying along the posterior aspect of the distal esophagus appears new when compared to the prior examination. The clinical significance of this finding is unclear. At least one of the lymph nodes located previously along the lesser curvature of the stomach is decreased in size as described above. Other lymph nodes in this region are normal in size and stable in appearance. Unchanged mesenteric panniculitis.     He began systemic chemotherapy with CapeOx on 2/23/24- he developed Covid after the first cycle so cycle 2 was delayed. Planning 4 cycles total (12 weeks) then discuss surgery. He has now completed VONDA.    CT A/P 5/17/24:  IMPRESSION:    Most likely post-therapeutic diffuse mucosal edema of the rectum.  Resolved lymph nodes in the mesorectal fat. No new suspicious  adenopathy. No evidence of new distant metastatic disease. Continued  follow-up is requested.    MRI Rectum 6/13/24  IMPRESSION:   1. There has been a positive response to treatment, with a corresponding tumor regression grade of mrTRG-2 .  The measurable mass has almost completely resolved with some minimal residual tumor signal suspected on the background of rectal wall edema.  2. Previously measured lymph nodes have decreased in size, now not enlarged by size criteria.  3. Wall thickening with marked edema of the rectosigmoid colon and adjacent pericolonic and pelvic fat with small volume of ascites in the pelvis, presumably related to chemoradiation therapy.  4. Circumferential wall thickening of the bladder noted. This may be part due to degree of distention however could also be related to chemotherapy radiation therapy.                Component  Ref Range & Units 2 wk ago  (5/3/24) 1 mo ago  (4/9/24) 2 mo ago  (3/22/24) 2 mo ago  (2/23/24) 3 mo ago  (1/25/24) 6 mo  ago  (11/22/23)    CEA  ng/mL 4.4 4.2 CM 4.2 CM 2.5 CM 5.3 CM 5.8 CM        Follow up MRI Rectum (8/29/24): TRG1 response      Medical history:  -none    Surgical history:  -none    Family history:  No Hx of IBD or GI malignancy    Medications:  Current Outpatient Medications   Medication Sig Dispense Refill    ibuprofen (ADVIL/MOTRIN) 200 MG tablet Take 200 mg by mouth daily (Patient not taking: Reported on 6/7/2024)      loperamide (IMODIUM) 2 MG capsule Start with 2 caps (4 mg), then take one cap (2 mg) after each diarrheal stool as needed. Do not use more than 8 caps (16 mg) per day. (Patient not taking: Reported on 6/7/2024) 30 capsule 0    ondansetron (ZOFRAN) 8 MG tablet Take 1 tablet (8 mg) by mouth every 8 hours as needed for nausea (vomiting) (Patient not taking: Reported on 6/7/2024) 30 tablet 2    prochlorperazine (COMPAZINE) 10 MG tablet Take 1 tablet (10 mg) by mouth every 6 hours as needed for nausea or vomiting (Patient not taking: Reported on 6/7/2024) 30 tablet 2    senna-docusate (SENOKOT-S/PERICOLACE) 8.6-50 MG tablet Take 1 tablet by mouth 2 times daily      sildenafil (VIAGRA) 100 MG tablet  (Patient not taking: Reported on 6/7/2024)      triamterene-HCTZ (DYAZIDE) 37.5-25 MG capsule Take 1 capsule by mouth every morning      urea (CARMOL) 10 % external cream Apply topically as needed for dry skin (Patient not taking: Reported on 6/7/2024) 85 g 1       Allergies:  Allergies   Allergen Reactions    Penicillins      Childhood       Social history:   Social History     Tobacco Use    Smoking status: Never    Smokeless tobacco: Former     Quit date: 1990    Tobacco comments:     Chewed for 10-15 years.    Substance Use Topics    Alcohol use: Not Currently     Comment: rarely     Marital status: unknown.    ROS:  A complete review of systems was performed with the patient and all systems negative except as per HPI.    Physical Examination:  Exam was chaperoned by Joanna Meredith CMA  There were no  vitals taken for this visit.  General: Well hydrated. No acute distress.  Abdomen: Soft, NT, ND, well healed midline incision from prior trauma.. No inguinal adenopathy palpated.  Perianal external examination:  Perianal skin: intact.  Lesions: No.  Eversion of buttocks: There was not evidence of an anal fissure. Details: N/A.  Skin tags or external hemorrhoids: None.  Digital rectal examination: Was performed.   Sphincter tone: Good.  Palpable lesions: No.  Other: None.  Bimanual examination: was not performed.    Anoscopy: Was performed.   Hemorrhoids: No significant internal hemorrhoids.  Lesions: No    Procedures:  Prior to the start of the procedure and with procedural staff participation, I verbally confirmed the patient s identity using two indicators, relevant allergies, that the procedure was appropriate and matched the consent or emergent situation, and that the correct equipment/implants were available. Immediately prior to starting the procedure I conducted the Time Out with the procedural staff and re-confirmed the patient s name, procedure, and site/side. (The Joint Commission universal protocol was followed.)  Yes    Sedation (Moderate or Deep): None    Flexible sigmoidoscopy was performed to 30 cm. No evidence of any residual tumor present, passed multiple times through rectum. Pictures taken and uploaded.    ASSESSMENT  1. Rectal cancer  2. Hypertension    PLAN  1. Will add to tumor board.  2. Repeat flex sig and MRI in 8 weeks for repeat evaluation, since chemo (cycle 4) was completed 3 weeks ago, cut short due to poor chemo tolerance.     Follow up per Watch and Wait Protocol:   Completed CRT: May 23, 2024  Flexible sigmoidoscopy   Every 3 months for 6 months: Until Nov '24  Every 4 months for 3 years: Until Nov '27  Every 6 months for 5 years: Until Nov '29  Every year for 10 years: Until Nov '34    3T MRI of pelvis  6-8 weeks after CRT:  Every 4 months for 2 years: Until Nov '26  Every 6 months  for 2 years: Until Nov '28  Yearly for 2 years: Until Nov '30    CT CAP  Every year for 6-8 years    Colonoscopy   Plan in Fall 2025    CEA at every clinic or endoscopic visit      Risks, benefits, and alternatives of operative treatment were thoroughly discussed with the patient, he understands these well and agrees to proceed.    Time spent: 45 minutes, an additional 15 minutes were spent doing flex sig procedure.  >50% spent in discussing, counseling and coordinating care.    Tommy Rodriguez M.D    Division of Colon and Rectal Surgery  [facility]    Referring Provider:  Greg Waddell MD  [address] [place] [id number]     Primary Care Provider:  No Ref-Primary, Physician

## 2024-08-29 ENCOUNTER — ANCILLARY PROCEDURE (OUTPATIENT)
Dept: MRI IMAGING | Facility: CLINIC | Age: 74
End: 2024-08-29
Attending: SURGERY
Payer: MEDICARE

## 2024-08-29 ENCOUNTER — OFFICE VISIT (OUTPATIENT)
Dept: SURGERY | Facility: CLINIC | Age: 74
End: 2024-08-29
Payer: MEDICARE

## 2024-08-29 ENCOUNTER — DOCUMENTATION ONLY (OUTPATIENT)
Dept: ONCOLOGY | Facility: OTHER | Age: 74
End: 2024-08-29

## 2024-08-29 VITALS
HEIGHT: 67 IN | HEART RATE: 66 BPM | WEIGHT: 200.7 LBS | BODY MASS INDEX: 31.5 KG/M2 | DIASTOLIC BLOOD PRESSURE: 83 MMHG | SYSTOLIC BLOOD PRESSURE: 161 MMHG | OXYGEN SATURATION: 98 %

## 2024-08-29 DIAGNOSIS — C20 RECTAL CANCER (H): ICD-10-CM

## 2024-08-29 DIAGNOSIS — C20 RECTAL CANCER (H): Primary | ICD-10-CM

## 2024-08-29 PROCEDURE — 74183 MRI ABD W/O CNTR FLWD CNTR: CPT | Mod: MG | Performed by: STUDENT IN AN ORGANIZED HEALTH CARE EDUCATION/TRAINING PROGRAM

## 2024-08-29 PROCEDURE — G1010 CDSM STANSON: HCPCS | Performed by: STUDENT IN AN ORGANIZED HEALTH CARE EDUCATION/TRAINING PROGRAM

## 2024-08-29 PROCEDURE — A9585 GADOBUTROL INJECTION: HCPCS | Mod: JW | Performed by: STUDENT IN AN ORGANIZED HEALTH CARE EDUCATION/TRAINING PROGRAM

## 2024-08-29 PROCEDURE — 99214 OFFICE O/P EST MOD 30 MIN: CPT | Mod: 25 | Performed by: SURGERY

## 2024-08-29 PROCEDURE — 45330 DIAGNOSTIC SIGMOIDOSCOPY: CPT | Performed by: SURGERY

## 2024-08-29 RX ORDER — GADOBUTROL 604.72 MG/ML
10 INJECTION INTRAVENOUS ONCE
Status: COMPLETED | OUTPATIENT
Start: 2024-08-29 | End: 2024-08-29

## 2024-08-29 RX ADMIN — GADOBUTROL 9 ML: 604.72 INJECTION INTRAVENOUS at 08:35

## 2024-08-29 ASSESSMENT — PAIN SCALES - GENERAL: PAINLEVEL: NO PAIN (0)

## 2024-08-29 NOTE — DISCHARGE INSTRUCTIONS
MRI Contrast Discharge Instructions    The IV contrast you received today will pass out of your body in your  urine. This will happen in the next 24 hours. You will not feel this process.  Your urine will not change color.    Drink at least 4 extra glasses of water or juice today (unless your doctor  has restricted your fluids). This reduces the stress on your kidneys.  You may take your regular medicines.    If you are on dialysis: It is best to have dialysis today.    If you have a reaction: Most reactions happen right away. If you have  any new symptoms after leaving the hospital (such as hives or swelling),  call your hospital at the correct number below. Or call your family doctor.  If you have breathing distress or wheezing, call 911.    Special instructions: ***    I have read and understand the above information.    Signature:______________________________________ Date:___________    Staff:__________________________________________ Date:___________     Time:__________    [facility] Departments:    ___[place]: [phone]  ___[facility]: [phone]  ___[place]: [phone] ___[facility]: [phone]  ___[facility]: [phone]  ___[facility]: [phone]  ___[phone]  ___[facility]: [phone]  ___[phone]

## 2024-08-29 NOTE — PATIENT INSTRUCTIONS
Follow up:  Follow up per Watch and Wait Protocol:   Completed CRT: 5/23/24  Flexible sigmoidoscopy   Every 3 months for 6 months: Until Nov '24 - Due Dec. 2024  Every 4 months for 3 years: Until Nov '27  Every 6 months for 5 years: Until Nov '29  Every year for 10 years: Until Nov '34     3T MRI of pelvis  6-8 weeks after CRT: Completed   Every 4 months for 2 years: Until Nov '26- Due Dec. 24  Every 6 months for 2 years: Until Nov '28  Yearly for 2 years: Until Nov '30     CT CAP  Every year for 6-8 years - Due May 2025     Colonoscopy   Plan in Fall 2025     CEA at every clinic or endoscopic visit    Please call with any questions or concerns regarding your clinic visit today.    It is a pleasure being involved in your health care.    Contacts post-consultation depending on your need:    Radiology Appointments [phone]    Schedule Clinic Appointments [phone] # 1   M-F 7:30 - 5 pm    DHARM[facility] Tanner [phone]    Clinic Fax Number [phone]    Surgery Scheduling [phone]    My Chart is available 24 hours a day and is a secure way to access your records and communicate with your care team.  I strongly recommend signing up if you haven't already done so, if you are comfortable with computers.  If you would like to inquire about this or are having problems with My Chart access, you may call [phone] or go online at [email or web address].  Please allow at least 24 hours for a response and extra time on weekends and Holidays.

## 2024-08-29 NOTE — LETTER
2024       RE: Nicolas Malone  [address] [facility] [address]     Dear Colleague,    Thank you for referring your patient, Nicolas Malone, to the [facility] COLON AND RECTAL SURGERY CLINIC [place] at [facility]. Please see a copy of my visit note below.    Colon and Rectal Surgery Clinic Note    RE: Nicolas Malone.  : 1950.  ROXIE: 24.    Reason for visit: rectal cancer follow up [facility].      HPI: Nicolas Malone is a 73 year old male who presents today for rectal cancer. He has no significant past medical history. On 2023 he underwent a colonoscopy for a positive cologaurd test. Colonoscopy demonstrated a rectal mass. Biopsy was positive for moderately differentiated adenocarcinoma, MMR intact. CEA 5.8. CT Chest/Abdomen/Pelvis on 2023 showed two adjacent deep pelvic lymph nodes measuring 6 and 7mm suspicious for ayla disease. MRI rectum on 2023 showed a 2cm rectal mass without evidence of invasion into the perirectal fat or adjacent structures.                                                                     Colonoscopy (2023):  Rectal exam was performed and a mass was palpated at the distal aspect of my finger length or proximally 5 cm.  The colonoscope was inserted into the anus and passed without difficulty to the cecum.  The cecum was identified by the ileocecal valve, the coalescence of the tinea and the appendiceal orifice.  Upon withdrawal all walls of the colon were visualized.  A mass was identified in the rectum.  A large piece was taken using the snare.  Colitis were not seen.colon  Diverticulosis was not seen.  Upon reaching the rectum the scope was retroflexed and internal hemorrhoids  were  seen.  The scope was straightened back out and removed from the patient.  The patient was then taken to the recovery room in stable condition tolerating the procedure well.       Surgical Pathology (2023):  A.  Duodenum,  biopsy:  -Duodenal mucosa with no diagnostic abnormality.  B.  Stomach, antrum, biopsy:  -Antral mucosa with no diagnostic abnormality.  C.  Esophagus, distal, biopsy:  -Squamous mucosa with no diagnostic abnormality.  D.  Rectum, lesion, biopsy:  -Invasive moderately-differentiated adenocarcinoma with focal background features of a tubulovillous adenoma.  -Tumor present at inked deep margin.  -Small separate mucosal fragment with features of a hyperplastic polyp.  -See comment..  Mismatch Repair     Immunohistochemistry (IHC) Testing for Mismatch Repair (MMR) Proteins     MLH1 Result  Intact nuclear expression   Immunohistochemistry (IHC) Testing for Mismatch Repair (MMR) Proteins     MSH2 Result  Intact nuclear expression   Immunohistochemistry (IHC) Testing for Mismatch Repair (MMR) Proteins     MSH6 Result  Intact nuclear expression   Immunohistochemistry (IHC) Testing for Mismatch Repair (MMR) Proteins     PMS2 Result  Intact nuclear expression   Immunohistochemistry (IHC) Testing for Mismatch Repair (MMR) Proteins  Background nonneoplastic tissue / internal control with intact nuclear expression   IHC Interpretation  No loss of nuclear expression of MMR proteins: low probability of MSI-H       CEA (11/22/2023): 5.8     CT Chest/Abdomen/Pelvis (11/28/2023):  IMPRESSION:     Left posterior rectal mucosal mass measuring up to 26 mm.    2 adjacent deep pelvic lymph nodes measure 7 and 6 mm, with rounded  contour, nonspecific, but suspicious for local ayla involvement.   Multiple small pulmonary nodules are highly nonspecific.    MRI Rectum (11/28/2023):                                IMPRESSION:  2 cm rectal mass without evidence of invasion into the perirectal fat or adjacent structures.  Two subcentimeter deep left pelvic lymph nodes are nonspecific.    Interval history:  He underwent concurrent chemoradiation 12/28/2023 to 2/6/2024 with with capecitabine     CT A/P 2/12/24:  Asymmetric wall thickening of the  rectum appears worse when compared to prior study. Previous a, only the left dorsolateral aspect of the rectum was previously involved. Currently, the dorsal and lateral aspects are not thickened. It is unclear if this is related to progression of disease and/or related to reactive changes from therapy. Slight interval decrease in the size of a 6.7 mm normal-sized lymph node in the left parasagittal presacral soft tissues, previously measuring 8.8 mm. 13 x 9 mm enlarged lymph node lying along the posterior aspect of the distal esophagus appears new when compared to the prior examination. The clinical significance of this finding is unclear. At least one of the lymph nodes located previously along the lesser curvature of the stomach is decreased in size as described above. Other lymph nodes in this region are normal in size and stable in appearance. Unchanged mesenteric panniculitis.     He began systemic chemotherapy with CapeOx on 2/23/24- he developed Covid after the first cycle so cycle 2 was delayed. Planning 4 cycles total (12 weeks) then discuss surgery. He has now completed VONDA.    CT A/P 5/17/24:  IMPRESSION:    Most likely post-therapeutic diffuse mucosal edema of the rectum.  Resolved lymph nodes in the mesorectal fat. No new suspicious  adenopathy. No evidence of new distant metastatic disease. Continued  follow-up is requested.    MRI Rectum 6/13/24  IMPRESSION:   1. There has been a positive response to treatment, with a corresponding tumor regression grade of mrTRG-2 .  The measurable mass has almost completely resolved with some minimal residual tumor signal suspected on the background of rectal wall edema.  2. Previously measured lymph nodes have decreased in size, now not enlarged by size criteria.  3. Wall thickening with marked edema of the rectosigmoid colon and adjacent pericolonic and pelvic fat with small volume of ascites in the pelvis, presumably related to chemoradiation therapy.  4.  Circumferential wall thickening of the bladder noted. This may be part due to degree of distention however could also be related to chemotherapy radiation therapy.                Component  Ref Range & Units 2 wk ago  (5/3/24) 1 mo ago  (4/9/24) 2 mo ago  (3/22/24) 2 mo ago  (2/23/24) 3 mo ago  (1/25/24) 6 mo ago  (11/22/23)    CEA  ng/mL 4.4 4.2 CM 4.2 CM 2.5 CM 5.3 CM 5.8 CM        Follow up MRI Rectum (8/29/24): TRG1 response      Medical history:  -none    Surgical history:  -none    Family history:  No Hx of IBD or GI malignancy    Medications:  Current Outpatient Medications   Medication Sig Dispense Refill     ibuprofen (ADVIL/MOTRIN) 200 MG tablet Take 200 mg by mouth daily (Patient not taking: Reported on 6/7/2024)       loperamide (IMODIUM) 2 MG capsule Start with 2 caps (4 mg), then take one cap (2 mg) after each diarrheal stool as needed. Do not use more than 8 caps (16 mg) per day. (Patient not taking: Reported on 6/7/2024) 30 capsule 0     ondansetron (ZOFRAN) 8 MG tablet Take 1 tablet (8 mg) by mouth every 8 hours as needed for nausea (vomiting) (Patient not taking: Reported on 6/7/2024) 30 tablet 2     prochlorperazine (COMPAZINE) 10 MG tablet Take 1 tablet (10 mg) by mouth every 6 hours as needed for nausea or vomiting (Patient not taking: Reported on 6/7/2024) 30 tablet 2     senna-docusate (SENOKOT-S/PERICOLACE) 8.6-50 MG tablet Take 1 tablet by mouth 2 times daily       sildenafil (VIAGRA) 100 MG tablet  (Patient not taking: Reported on 6/7/2024)       triamterene-HCTZ (DYAZIDE) 37.5-25 MG capsule Take 1 capsule by mouth every morning       urea (CARMOL) 10 % external cream Apply topically as needed for dry skin (Patient not taking: Reported on 6/7/2024) 85 g 1       Allergies:  Allergies   Allergen Reactions     Penicillins      Childhood       Social history:   Social History     Tobacco Use     Smoking status: Never     Smokeless tobacco: Former     Quit date: 1990     Tobacco comments:      Chewed for 10-15 years.    Substance Use Topics     Alcohol use: Not Currently     Comment: rarely     Marital status: unknown.    ROS:  A complete review of systems was performed with the patient and all systems negative except as per HPI.    Physical Examination:  Exam was chaperoned by Joanna Meredith CMA  There were no vitals taken for this visit.  General: Well hydrated. No acute distress.  Abdomen: Soft, NT, ND, well healed midline incision from prior trauma.. No inguinal adenopathy palpated.  Perianal external examination:  Perianal skin: intact.  Lesions: No.  Eversion of buttocks: There was not evidence of an anal fissure. Details: N/A.  Skin tags or external hemorrhoids: None.  Digital rectal examination: Was performed.   Sphincter tone: Good.  Palpable lesions: No.  Other: None.  Bimanual examination: was not performed.    Anoscopy: Was performed.   Hemorrhoids: No significant internal hemorrhoids.  Lesions: No    Procedures:  Prior to the start of the procedure and with procedural staff participation, I verbally confirmed the patient s identity using two indicators, relevant allergies, that the procedure was appropriate and matched the consent or emergent situation, and that the correct equipment/implants were available. Immediately prior to starting the procedure I conducted the Time Out with the procedural staff and re-confirmed the patient s name, procedure, and site/side. (The Joint Commission universal protocol was followed.)  Yes    Sedation (Moderate or Deep): None    Flexible sigmoidoscopy was performed to 30 cm. No evidence of any residual tumor present, passed multiple times through rectum. Pictures taken and uploaded.    ASSESSMENT  1. Rectal cancer  2. Hypertension    PLAN  1. Will add to tumor board.  2. Repeat flex sig and MRI in 8 weeks for repeat evaluation, since chemo (cycle 4) was completed 3 weeks ago, cut short due to poor chemo tolerance.     Follow up per Watch and Wait Protocol:    Completed CRT: May 23, 2024  Flexible sigmoidoscopy   Every 3 months for 6 months: Until Nov '24  Every 4 months for 3 years: Until Nov '27  Every 6 months for 5 years: Until Nov '29  Every year for 10 years: Until Nov '34    3T MRI of pelvis  6-8 weeks after CRT:  Every 4 months for 2 years: Until Nov '26  Every 6 months for 2 years: Until Nov '28  Yearly for 2 years: Until Nov '30    CT CAP  Every year for 6-8 years    Colonoscopy   Plan in Fall 2025    CEA at every clinic or endoscopic visit      Risks, benefits, and alternatives of operative treatment were thoroughly discussed with the patient, he understands these well and agrees to proceed.    Time spent: 45 minutes, an additional 15 minutes were spent doing flex sig procedure.  >50% spent in discussing, counseling and coordinating care.    Tommy Rodriguez M.D    Division of Colon and Rectal Surgery  [facility]    Referring Provider:  Greg Waddell MD  [address] [place] [address]     Primary Care Provider:  No Ref-Primary, Physician      Again, thank you for allowing me to participate in the care of your patient.      Sincerely,    Tommy Rodriguez MD, MD

## 2024-08-29 NOTE — NURSING NOTE
"Chief Complaint   Patient presents with    Flexible Sigmoidoscopy       Vitals:    08/29/24 1055   BP: (!) 161/83   BP Location: Left arm   Patient Position: Sitting   Cuff Size: Adult Regular   Pulse: 66   SpO2: 98%   Weight: 200 lb 11.2 oz   Height: 5' 7\"       Body mass index is 31.43 kg/m .    Joanna Meredith CMA    "

## 2024-08-29 NOTE — PROGRESS NOTES
Thank you for the opportunity to be a part in the care of this patient's oral chemotherapy. The oncology pharmacy will no longer be following this patient for oral chemotherapy. If there are any questions or the plan changes, feel free to contact us.    Sergio Santoyo, PharmD, [facility]  Oncology Pharmacy Manager  [facility]  [phone]

## 2024-09-18 ENCOUNTER — ONCOLOGY VISIT (OUTPATIENT)
Dept: ONCOLOGY | Facility: OTHER | Age: 74
End: 2024-09-18
Attending: INTERNAL MEDICINE
Payer: MEDICARE

## 2024-09-18 VITALS
WEIGHT: 203.71 LBS | TEMPERATURE: 96.9 F | DIASTOLIC BLOOD PRESSURE: 70 MMHG | BODY MASS INDEX: 31.9 KG/M2 | SYSTOLIC BLOOD PRESSURE: 162 MMHG | HEART RATE: 59 BPM | OXYGEN SATURATION: 94 %

## 2024-09-18 DIAGNOSIS — C20 RECTAL CANCER (H): Primary | ICD-10-CM

## 2024-09-18 LAB
ALBUMIN SERPL BCG-MCNC: 4.4 G/DL (ref 3.5–5.2)
ALP SERPL-CCNC: 91 U/L (ref 40–150)
ALT SERPL W P-5'-P-CCNC: 19 U/L (ref 0–70)
ANION GAP SERPL CALCULATED.3IONS-SCNC: 10 MMOL/L (ref 7–15)
AST SERPL W P-5'-P-CCNC: 26 U/L (ref 0–45)
BASOPHILS # BLD AUTO: 0 10E3/UL (ref 0–0.2)
BASOPHILS NFR BLD AUTO: 1 %
BILIRUB SERPL-MCNC: 0.2 MG/DL
BUN SERPL-MCNC: 23.3 MG/DL (ref 8–23)
CALCIUM SERPL-MCNC: 9.4 MG/DL (ref 8.8–10.4)
CHLORIDE SERPL-SCNC: 103 MMOL/L (ref 98–107)
CREAT SERPL-MCNC: 1.11 MG/DL (ref 0.67–1.17)
EGFRCR SERPLBLD CKD-EPI 2021: 71 ML/MIN/1.73M2
EOSINOPHIL # BLD AUTO: 0.3 10E3/UL (ref 0–0.7)
EOSINOPHIL NFR BLD AUTO: 6 %
ERYTHROCYTE [DISTWIDTH] IN BLOOD BY AUTOMATED COUNT: 11.8 % (ref 10–15)
GLUCOSE SERPL-MCNC: 110 MG/DL (ref 70–99)
HCO3 SERPL-SCNC: 27 MMOL/L (ref 22–29)
HCT VFR BLD AUTO: 43 % (ref 40–53)
HGB BLD-MCNC: 14.6 G/DL (ref 13.3–17.7)
IMM GRANULOCYTES # BLD: 0 10E3/UL
IMM GRANULOCYTES NFR BLD: 0 %
LYMPHOCYTES # BLD AUTO: 0.5 10E3/UL (ref 0.8–5.3)
LYMPHOCYTES NFR BLD AUTO: 11 %
MCH RBC QN AUTO: 30.5 PG (ref 26.5–33)
MCHC RBC AUTO-ENTMCNC: 34 G/DL (ref 31.5–36.5)
MCV RBC AUTO: 90 FL (ref 78–100)
MONOCYTES # BLD AUTO: 0.6 10E3/UL (ref 0–1.3)
MONOCYTES NFR BLD AUTO: 11 %
NEUTROPHILS # BLD AUTO: 3.6 10E3/UL (ref 1.6–8.3)
NEUTROPHILS NFR BLD AUTO: 72 %
NRBC # BLD AUTO: 0 10E3/UL
NRBC BLD AUTO-RTO: 0 /100
PLATELET # BLD AUTO: 158 10E3/UL (ref 150–450)
POTASSIUM SERPL-SCNC: 3.6 MMOL/L (ref 3.4–5.3)
PROT SERPL-MCNC: 6.7 G/DL (ref 6.4–8.3)
RBC # BLD AUTO: 4.78 10E6/UL (ref 4.4–5.9)
SODIUM SERPL-SCNC: 140 MMOL/L (ref 135–145)
WBC # BLD AUTO: 5.1 10E3/UL (ref 4–11)

## 2024-09-18 PROCEDURE — 85048 AUTOMATED LEUKOCYTE COUNT: CPT | Mod: ZL | Performed by: INTERNAL MEDICINE

## 2024-09-18 PROCEDURE — 80053 COMPREHEN METABOLIC PANEL: CPT | Mod: ZL | Performed by: INTERNAL MEDICINE

## 2024-09-18 PROCEDURE — 36415 COLL VENOUS BLD VENIPUNCTURE: CPT | Mod: ZL | Performed by: INTERNAL MEDICINE

## 2024-09-18 PROCEDURE — G0463 HOSPITAL OUTPT CLINIC VISIT: HCPCS

## 2024-09-18 PROCEDURE — 82378 CARCINOEMBRYONIC ANTIGEN: CPT | Mod: ZL | Performed by: INTERNAL MEDICINE

## 2024-09-18 PROCEDURE — 99215 OFFICE O/P EST HI 40 MIN: CPT | Performed by: INTERNAL MEDICINE

## 2024-09-18 RX ORDER — DULOXETIN HYDROCHLORIDE 30 MG/1
30 CAPSULE, DELAYED RELEASE ORAL DAILY
Qty: 45 CAPSULE | Refills: 0 | Status: SHIPPED | OUTPATIENT
Start: 2024-09-18

## 2024-09-18 ASSESSMENT — PAIN SCALES - GENERAL: PAINLEVEL: NO PAIN (0)
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
## 2024-09-18 NOTE — NURSING NOTE
"Oncology Rooming Note    September 18, 2024 10:24 AM   Nicolas Malone is a 73 year old male who presents for:    Chief Complaint   Patient presents with    Oncology Clinic Visit     Follow upRectal cancer      Initial Vitals: BP (!) 162/70   Pulse 59   Temp 96.9  F (36.1  C) (Tympanic)   Wt 92.4 kg (203 lb 11.3 oz)   SpO2 94%   BMI 31.90 kg/m   Estimated body mass index is 31.9 kg/m  as calculated from the following:    Height as of 8/29/24: 1.702 m (5' 7\").    Weight as of this encounter: 92.4 kg (203 lb 11.3 oz). Body surface area is 2.09 meters squared.  No Pain (0) Comment: Data Unavailable   No LMP for male patient.  Allergies reviewed: Yes  Medications reviewed: Yes    Medications: Medication refills not needed today.  Pharmacy name entered into [facility]:    [id number] [address]  AT [facility] [address]  [facility] - [place], [place] - [address]    Frailty Screening:   Is the patient here for a new oncology consult visit in cancer care? 2. No      Clinical concerns: none       Jessica Kimbrough LPN             "

## 2024-09-19 LAB — CEA SERPL-MCNC: 3.4 NG/ML

## 2024-10-22 NOTE — PROGRESS NOTES
Colon and Rectal Surgery Clinic Note    RE: Nicolas Malone.  : 1950.  ROXIE: 24.    Reason for visit: rectal cancer follow up W and W.      HPI (24): Nicolas Malone is a 73 year old male who presents today for rectal cancer. He has no significant past medical history. On 2023 he underwent a colonoscopy for a positive cologaurd test. Colonoscopy demonstrated a rectal mass. Biopsy was positive for moderately differentiated adenocarcinoma, MMR intact. CEA 5.8. CT Chest/Abdomen/Pelvis on 2023 showed two adjacent deep pelvic lymph nodes measuring 6 and 7mm suspicious for ayla disease. MRI rectum on 2023 showed a 2cm rectal mass without evidence of invasion into the perirectal fat or adjacent structures.                                                                     Colonoscopy (2023):  Rectal exam was performed and a mass was palpated at the distal aspect of my finger length or proximally 5 cm.  The colonoscope was inserted into the anus and passed without difficulty to the cecum.  The cecum was identified by the ileocecal valve, the coalescence of the tinea and the appendiceal orifice.  Upon withdrawal all walls of the colon were visualized.  A mass was identified in the rectum.  A large piece was taken using the snare.  Colitis were not seen.colon  Diverticulosis was not seen.  Upon reaching the rectum the scope was retroflexed and internal hemorrhoids  were  seen.  The scope was straightened back out and removed from the patient.  The patient was then taken to the recovery room in stable condition tolerating the procedure well.       Surgical Pathology (2023):  A.  Duodenum, biopsy:  -Duodenal mucosa with no diagnostic abnormality.  B.  Stomach, antrum, biopsy:  -Antral mucosa with no diagnostic abnormality.  C.  Esophagus, distal, biopsy:  -Squamous mucosa with no diagnostic abnormality.  D.  Rectum, lesion, biopsy:  -Invasive moderately-differentiated adenocarcinoma with  focal background features of a tubulovillous adenoma.  -Tumor present at inked deep margin.  -Small separate mucosal fragment with features of a hyperplastic polyp.  -See comment..  Mismatch Repair     Immunohistochemistry (IHC) Testing for Mismatch Repair (MMR) Proteins     MLH1 Result  Intact nuclear expression   Immunohistochemistry (IHC) Testing for Mismatch Repair (MMR) Proteins     MSH2 Result  Intact nuclear expression   Immunohistochemistry (IHC) Testing for Mismatch Repair (MMR) Proteins     MSH6 Result  Intact nuclear expression   Immunohistochemistry (IHC) Testing for Mismatch Repair (MMR) Proteins     PMS2 Result  Intact nuclear expression   Immunohistochemistry (IHC) Testing for Mismatch Repair (MMR) Proteins  Background nonneoplastic tissue / internal control with intact nuclear expression   IHC Interpretation  No loss of nuclear expression of MMR proteins: low probability of MSI-H       CEA (11/22/2023): 5.8     CT Chest/Abdomen/Pelvis (11/28/2023):  IMPRESSION:     Left posterior rectal mucosal mass measuring up to 26 mm.    2 adjacent deep pelvic lymph nodes measure 7 and 6 mm, with rounded  contour, nonspecific, but suspicious for local ayla involvement.   Multiple small pulmonary nodules are highly nonspecific.    MRI Rectum (11/28/2023):                                IMPRESSION:  2 cm rectal mass without evidence of invasion into the perirectal fat or adjacent structures.  Two subcentimeter deep left pelvic lymph nodes are nonspecific.    Interval history:  He underwent concurrent chemoradiation 12/28/2023 to 2/6/2024 with with capecitabine     CT A/P 2/12/24:  Asymmetric wall thickening of the rectum appears worse when compared to prior study. Previous a, only the left dorsolateral aspect of the rectum was previously involved. Currently, the dorsal and lateral aspects are not thickened. It is unclear if this is related to progression of disease and/or related to reactive changes from therapy.  Slight interval decrease in the size of a 6.7 mm normal-sized lymph node in the left parasagittal presacral soft tissues, previously measuring 8.8 mm. 13 x 9 mm enlarged lymph node lying along the posterior aspect of the distal esophagus appears new when compared to the prior examination. The clinical significance of this finding is unclear. At least one of the lymph nodes located previously along the lesser curvature of the stomach is decreased in size as described above. Other lymph nodes in this region are normal in size and stable in appearance. Unchanged mesenteric panniculitis.     He began systemic chemotherapy with CapeOx on 2/23/24- he developed Covid after the first cycle so cycle 2 was delayed. Planning 4 cycles total (12 weeks) then discuss surgery. He has now completed VONDA.    CT A/P 5/17/24:  IMPRESSION:    Most likely post-therapeutic diffuse mucosal edema of the rectum.  Resolved lymph nodes in the mesorectal fat. No new suspicious  adenopathy. No evidence of new distant metastatic disease. Continued  follow-up is requested.    MRI Rectum 6/13/24  IMPRESSION:   1. There has been a positive response to treatment, with a corresponding tumor regression grade of mrTRG-2 .  The measurable mass has almost completely resolved with some minimal residual tumor signal suspected on the background of rectal wall edema.  2. Previously measured lymph nodes have decreased in size, now not enlarged by size criteria.  3. Wall thickening with marked edema of the rectosigmoid colon and adjacent pericolonic and pelvic fat with small volume of ascites in the pelvis, presumably related to chemoradiation therapy.  4. Circumferential wall thickening of the bladder noted. This may be part due to degree of distention however could also be related to chemotherapy radiation therapy.                 Component  Ref Range & Units  2 wk ago  (5/3/24) 1 mo ago  (4/9/24) 2 mo ago  (3/22/24) 2 mo ago  (2/23/24) 3 mo ago  (1/25/24) 6  mo ago  (11/22/23)    CEA  ng/mL pending 4.4 4.2 CM 4.2 CM 2.5 CM 5.3 CM 5.8 CM        Follow up MRI Rectum (8/29/24): TRG1 response    MR Rectum (11/21/24) pending    Medical history:  -none    Surgical history:  -none    Family history:  No Hx of IBD or GI malignancy    Medications:  Current Outpatient Medications   Medication Sig Dispense Refill    DULoxetine (CYMBALTA) 30 MG capsule Take 1 capsule (30 mg) by mouth daily. 45 capsule 0    ibuprofen (ADVIL/MOTRIN) 200 MG tablet Take 200 mg by mouth daily (Patient not taking: Reported on 6/7/2024)      loperamide (IMODIUM) 2 MG capsule Start with 2 caps (4 mg), then take one cap (2 mg) after each diarrheal stool as needed. Do not use more than 8 caps (16 mg) per day. (Patient not taking: Reported on 6/7/2024) 30 capsule 0    ondansetron (ZOFRAN) 8 MG tablet Take 1 tablet (8 mg) by mouth every 8 hours as needed for nausea (vomiting) (Patient not taking: Reported on 6/7/2024) 30 tablet 2    prochlorperazine (COMPAZINE) 10 MG tablet Take 1 tablet (10 mg) by mouth every 6 hours as needed for nausea or vomiting (Patient not taking: Reported on 6/7/2024) 30 tablet 2    senna-docusate (SENOKOT-S/PERICOLACE) 8.6-50 MG tablet Take 1 tablet by mouth 2 times daily (Patient not taking: Reported on 9/18/2024)      sildenafil (VIAGRA) 100 MG tablet  (Patient not taking: Reported on 6/7/2024)      triamterene-HCTZ (DYAZIDE) 37.5-25 MG capsule Take 1 capsule by mouth every morning      urea (CARMOL) 10 % external cream Apply topically as needed for dry skin (Patient not taking: Reported on 8/29/2024) 85 g 1       Allergies:  Allergies   Allergen Reactions    Penicillins      Childhood       Social history:   Social History     Tobacco Use    Smoking status: Never    Smokeless tobacco: Former     Quit date: 1990    Tobacco comments:     Chewed for 10-15 years.    Substance Use Topics    Alcohol use: Not Currently     Comment: rarely     Marital status: unknown.    ROS:  A complete  review of systems was performed with the patient and all systems negative except as per HPI.    Physical Examination:  Exam was chaperoned by Joanna Meredith CMA  There were no vitals taken for this visit.  General: Well hydrated. No acute distress.  Abdomen: Soft, NT, ND, well healed midline incision from prior trauma.. No inguinal adenopathy palpated.  Perianal external examination:  Perianal skin: intact.  Lesions: No.  Eversion of buttocks: There was not evidence of an anal fissure. Details: N/A.  Skin tags or external hemorrhoids: None.  Digital rectal examination: Was performed.   Sphincter tone: Good.  Palpable lesions: No.  Other: None.  Bimanual examination: was not performed.    Anoscopy: Was performed.   Hemorrhoids: No significant internal hemorrhoids.  Lesions: No    Procedures:  Prior to the start of the procedure and with procedural staff participation, I verbally confirmed the patient s identity using two indicators, relevant allergies, that the procedure was appropriate and matched the consent or emergent situation, and that the correct equipment/implants were available. Immediately prior to starting the procedure I conducted the Time Out with the procedural staff and re-confirmed the patient s name, procedure, and site/side. (The Joint Commission universal protocol was followed.)  Yes    Sedation (Moderate or Deep): None    Flexible sigmoidoscopy was performed to 30 cm. No evidence of any residual tumor present, passed multiple times through rectum. Scar at low rectal valve seen, healthy and clean. Pictures taken and uploaded.     ASSESSMENT  1. Rectal cancer  2. Hypertension    PLAN    Follow up per Watch and Wait Protocol:   Completed CRT: May 23, 2024  Flexible sigmoidoscopy   Every 3 months for 6 months: Until Nov '24  Every 4 months for 3 years: Until Nov '27, next due March '25  Every 6 months for 5 years: Until Nov '29  Every year for 10 years: Until Nov '34    3T MRI of pelvis  6-8 weeks  after CRT:  Every 4 months for 2 years: Until Nov '26, next due March '25  Every 6 months for 2 years: Until Nov '28  Yearly for 2 years: Until Nov '30    CT CAP  Every year for 6-8 years    Colonoscopy   Plan in Fall 2025    CEA at every clinic or endoscopic visit      Risks, benefits, and alternatives of operative treatment were thoroughly discussed with the patient, he understands these well and agrees to proceed.    Time spent: 45 minutes, an additional 15 minutes were spent doing flex sig procedure.  >50% spent in discussing, counseling and coordinating care.    Tommy Rodriguez M.D    Division of Colon and Rectal Surgery  [facility]    Referring Provider:  Greg Waddell MD  [address] [place] [id number]     Primary Care Provider:  No Ref-Primary, Physician

## 2024-10-30 ENCOUNTER — MYC MEDICAL ADVICE (OUTPATIENT)
Dept: SURGERY | Facility: CLINIC | Age: 74
End: 2024-10-30
Payer: MEDICARE

## 2024-11-01 ENCOUNTER — TELEPHONE (OUTPATIENT)
Dept: SURGERY | Facility: CLINIC | Age: 74
End: 2024-11-01
Payer: MEDICARE

## 2024-11-01 NOTE — TELEPHONE ENCOUNTER
[facility] Call Center    Phone Message    May a detailed message be left on voicemail: yes     Reason for Call: Other: Pt requesting a call back in regards to flex sig appt he has on 11/21. Pt has some questions regarding MRI being schedule prior to 21st for the flex sig appt. Pt is looking to possibly get the appt for the flex sig rescheduled.       Action Taken: Other: clr    Travel Screening: Not Applicable     Date of Service:

## 2024-11-21 ENCOUNTER — ANCILLARY PROCEDURE (OUTPATIENT)
Dept: MRI IMAGING | Facility: CLINIC | Age: 74
End: 2024-11-21
Attending: SURGERY
Payer: MEDICARE

## 2024-11-21 ENCOUNTER — LAB (OUTPATIENT)
Dept: LAB | Facility: CLINIC | Age: 74
End: 2024-11-21
Payer: MEDICARE

## 2024-11-21 ENCOUNTER — OFFICE VISIT (OUTPATIENT)
Dept: SURGERY | Facility: CLINIC | Age: 74
End: 2024-11-21
Payer: MEDICARE

## 2024-11-21 VITALS
WEIGHT: 208.4 LBS | HEART RATE: 66 BPM | HEIGHT: 67 IN | BODY MASS INDEX: 32.71 KG/M2 | OXYGEN SATURATION: 98 % | SYSTOLIC BLOOD PRESSURE: 168 MMHG | DIASTOLIC BLOOD PRESSURE: 72 MMHG

## 2024-11-21 DIAGNOSIS — C20 RECTAL CANCER (H): ICD-10-CM

## 2024-11-21 DIAGNOSIS — C20 RECTAL CANCER (H): Primary | ICD-10-CM

## 2024-11-21 LAB
ALBUMIN SERPL BCG-MCNC: 4.2 G/DL (ref 3.5–5.2)
ALP SERPL-CCNC: 79 U/L (ref 40–150)
ALT SERPL W P-5'-P-CCNC: 18 U/L (ref 0–70)
ANION GAP SERPL CALCULATED.3IONS-SCNC: 10 MMOL/L (ref 7–15)
AST SERPL W P-5'-P-CCNC: 28 U/L (ref 0–45)
BASOPHILS # BLD AUTO: 0.1 10E3/UL (ref 0–0.2)
BASOPHILS NFR BLD AUTO: 1 %
BILIRUB SERPL-MCNC: 0.4 MG/DL
BUN SERPL-MCNC: 21.5 MG/DL (ref 8–23)
CALCIUM SERPL-MCNC: 9.4 MG/DL (ref 8.8–10.4)
CEA SERPL-MCNC: 2.8 NG/ML
CHLORIDE SERPL-SCNC: 105 MMOL/L (ref 98–107)
CREAT SERPL-MCNC: 1.13 MG/DL (ref 0.67–1.17)
EGFRCR SERPLBLD CKD-EPI 2021: 69 ML/MIN/1.73M2
EOSINOPHIL # BLD AUTO: 0.5 10E3/UL (ref 0–0.7)
EOSINOPHIL NFR BLD AUTO: 5 %
ERYTHROCYTE [DISTWIDTH] IN BLOOD BY AUTOMATED COUNT: 12.6 % (ref 10–15)
GLUCOSE SERPL-MCNC: 69 MG/DL (ref 70–99)
HCO3 SERPL-SCNC: 27 MMOL/L (ref 22–29)
HCT VFR BLD AUTO: 43.4 % (ref 40–53)
HGB BLD-MCNC: 15 G/DL (ref 13.3–17.7)
IMM GRANULOCYTES # BLD: 0 10E3/UL
IMM GRANULOCYTES NFR BLD: 1 %
LYMPHOCYTES # BLD AUTO: 0.7 10E3/UL (ref 0.8–5.3)
LYMPHOCYTES NFR BLD AUTO: 8 %
MAGNESIUM SERPL-MCNC: 2.3 MG/DL (ref 1.7–2.3)
MCH RBC QN AUTO: 30.4 PG (ref 26.5–33)
MCHC RBC AUTO-ENTMCNC: 34.6 G/DL (ref 31.5–36.5)
MCV RBC AUTO: 88 FL (ref 78–100)
MONOCYTES # BLD AUTO: 0.8 10E3/UL (ref 0–1.3)
MONOCYTES NFR BLD AUTO: 10 %
NEUTROPHILS # BLD AUTO: 6.3 10E3/UL (ref 1.6–8.3)
NEUTROPHILS NFR BLD AUTO: 76 %
NRBC # BLD AUTO: 0 10E3/UL
NRBC BLD AUTO-RTO: 0 /100
PLATELET # BLD AUTO: 174 10E3/UL (ref 150–450)
POTASSIUM SERPL-SCNC: 3.7 MMOL/L (ref 3.4–5.3)
PROT SERPL-MCNC: 6.8 G/DL (ref 6.4–8.3)
RBC # BLD AUTO: 4.93 10E6/UL (ref 4.4–5.9)
SODIUM SERPL-SCNC: 142 MMOL/L (ref 135–145)
WBC # BLD AUTO: 8.3 10E3/UL (ref 4–11)

## 2024-11-21 PROCEDURE — 85004 AUTOMATED DIFF WBC COUNT: CPT

## 2024-11-21 PROCEDURE — 82310 ASSAY OF CALCIUM: CPT

## 2024-11-21 PROCEDURE — A9585 GADOBUTROL INJECTION: HCPCS | Performed by: STUDENT IN AN ORGANIZED HEALTH CARE EDUCATION/TRAINING PROGRAM

## 2024-11-21 PROCEDURE — 82378 CARCINOEMBRYONIC ANTIGEN: CPT

## 2024-11-21 PROCEDURE — 74183 MRI ABD W/O CNTR FLWD CNTR: CPT | Mod: MG | Performed by: STUDENT IN AN ORGANIZED HEALTH CARE EDUCATION/TRAINING PROGRAM

## 2024-11-21 PROCEDURE — 85014 HEMATOCRIT: CPT

## 2024-11-21 PROCEDURE — G1010 CDSM STANSON: HCPCS | Performed by: STUDENT IN AN ORGANIZED HEALTH CARE EDUCATION/TRAINING PROGRAM

## 2024-11-21 PROCEDURE — 83735 ASSAY OF MAGNESIUM: CPT

## 2024-11-21 PROCEDURE — 36415 COLL VENOUS BLD VENIPUNCTURE: CPT

## 2024-11-21 PROCEDURE — 80053 COMPREHEN METABOLIC PANEL: CPT

## 2024-11-21 RX ORDER — GADOBUTROL 604.72 MG/ML
10 INJECTION INTRAVENOUS ONCE
Status: COMPLETED | OUTPATIENT
Start: 2024-11-21 | End: 2024-11-21

## 2024-11-21 RX ADMIN — GADOBUTROL 9.5 ML: 604.72 INJECTION INTRAVENOUS at 09:27

## 2024-11-21 ASSESSMENT — PAIN SCALES - GENERAL: PAINLEVEL_OUTOF10: NO PAIN (0)

## 2024-11-21 NOTE — DISCHARGE INSTRUCTIONS
MRI Contrast Discharge Instructions    The IV contrast you received today will pass out of your body in your  urine. This will happen in the next 24 hours. You will not feel this process.  Your urine will not change color.    Drink at least 4 extra glasses of water or juice today (unless your doctor  has restricted your fluids). This reduces the stress on your kidneys.  You may take your regular medicines.    If you are on dialysis: It is best to have dialysis today.    If you have a reaction: Most reactions happen right away. If you have  any new symptoms after leaving the hospital (such as hives or swelling),  call your hospital at the correct number below. Or call your family doctor.  If you have breathing distress or wheezing, call 911.    Special instructions: ***    I have read and understand the above information.    Signature:______________________________________ Date:___________    Staff:__________________________________________ Date:___________     Time:__________    [place] [facility] Departments:    ___[place]: [phone]  ___[facility]: [phone]  ___[place]: [phone] ___[facility]: [phone]  ___[facility]: [phone]  ___[facility]: [phone]  ___[phone]  ___[facility]: [phone]  ___[phone]

## 2024-11-21 NOTE — NURSING NOTE
"Chief Complaint   Patient presents with    Flexible Sigmoidoscopy       Vitals:    11/21/24 1152   BP: (!) 168/72   BP Location: Left arm   Patient Position: Sitting   Cuff Size: Adult Regular   Pulse: 66   SpO2: 98%   Weight: 208 lb 6.4 oz   Height: 5' 7\"       Body mass index is 32.64 kg/m .    Joanna Portillo CMA    "

## 2024-11-21 NOTE — PATIENT INSTRUCTIONS
Follow up per Watch and Wait Protocol:   Completed CRT: May 23, 2024  Flexible sigmoidoscopy   Every 3 months for 6 months: Until Nov '24  Every 4 months for 3 years: Until Nov '27, next due March '25 - will do full colonoscopy instead  Every 6 months for 5 years: Until Nov '29  Every year for 10 years: Until Nov '34     3T MRI of pelvis  6-8 weeks after CRT:  Every 4 months for 2 years: Until Nov '26, next due March '25  Every 6 months for 2 years: Until Nov '28  Yearly for 2 years: Until Nov '30     CT CAP  Every year for 6-8 years     Colonoscopy   Plan in Fall 2025     CEA at every clinic or endoscopic visit

## 2024-11-21 NOTE — NURSING NOTE
Chief Complaint   Patient presents with    Labs Only     Labs drawn via PIV placed in radiology and PIV removed.        IV placement in radiology, blood drawn by lab RN and IV removed.     Haley Thapa RN

## 2024-11-21 NOTE — LETTER
2024       RE: Nicolas Malone  [address] [facility] [address]     Dear Colleague,    Thank you for referring your patient, Nicolas Malone, to the [facility] [place] [facility]. Please see a copy of my visit note below.    Colon and Rectal Surgery Clinic Note    RE: Nicolas Malone.  : 1950.  ROXIE: 24.    Reason for visit: rectal cancer follow up [facility] and [facility].      HPI (24): Nicolas Malone is a 73 year old male who presents today for rectal cancer. He has no significant past medical history. On 2023 he underwent a colonoscopy for a positive cologaurd test. Colonoscopy demonstrated a rectal mass. Biopsy was positive for moderately differentiated adenocarcinoma, MMR intact. CEA 5.8. CT Chest/Abdomen/Pelvis on 2023 showed two adjacent deep pelvic lymph nodes measuring 6 and 7mm suspicious for ayla disease. MRI rectum on 2023 showed a 2cm rectal mass without evidence of invasion into the perirectal fat or adjacent structures.                                                                     Colonoscopy (2023):  Rectal exam was performed and a mass was palpated at the distal aspect of my finger length or proximally 5 cm.  The colonoscope was inserted into the anus and passed without difficulty to the cecum.  The cecum was identified by the ileocecal valve, the coalescence of the tinea and the appendiceal orifice.  Upon withdrawal all walls of the colon were visualized.  A mass was identified in the rectum.  A large piece was taken using the snare.  Colitis were not seen.colon  Diverticulosis was not seen.  Upon reaching the rectum the scope was retroflexed and internal hemorrhoids  were  seen.  The scope was straightened back out and removed from the patient.  The patient was then taken to the recovery room in stable condition tolerating the procedure well.       Surgical Pathology (2023):  DIMAS   Duodenum, biopsy:  -Duodenal mucosa with no diagnostic abnormality.  B.  Stomach, antrum, biopsy:  -Antral mucosa with no diagnostic abnormality.  C.  Esophagus, distal, biopsy:  -Squamous mucosa with no diagnostic abnormality.  D.  Rectum, lesion, biopsy:  -Invasive moderately-differentiated adenocarcinoma with focal background features of a tubulovillous adenoma.  -Tumor present at inked deep margin.  -Small separate mucosal fragment with features of a hyperplastic polyp.  -See comment..  Mismatch Repair     Immunohistochemistry (IHC) Testing for Mismatch Repair (MMR) Proteins     MLH1 Result  Intact nuclear expression   Immunohistochemistry (IHC) Testing for Mismatch Repair (MMR) Proteins     MSH2 Result  Intact nuclear expression   Immunohistochemistry (IHC) Testing for Mismatch Repair (MMR) Proteins     MSH6 Result  Intact nuclear expression   Immunohistochemistry (IHC) Testing for Mismatch Repair (MMR) Proteins     PMS2 Result  Intact nuclear expression   Immunohistochemistry (IHC) Testing for Mismatch Repair (MMR) Proteins  Background nonneoplastic tissue / internal control with intact nuclear expression   IHC Interpretation  No loss of nuclear expression of MMR proteins: low probability of MSI-H       CEA (11/22/2023): 5.8     CT Chest/Abdomen/Pelvis (11/28/2023):  IMPRESSION:     Left posterior rectal mucosal mass measuring up to 26 mm.    2 adjacent deep pelvic lymph nodes measure 7 and 6 mm, with rounded  contour, nonspecific, but suspicious for local ayla involvement.   Multiple small pulmonary nodules are highly nonspecific.    MRI Rectum (11/28/2023):                                IMPRESSION:  2 cm rectal mass without evidence of invasion into the perirectal fat or adjacent structures.  Two subcentimeter deep left pelvic lymph nodes are nonspecific.    Interval history:  He underwent concurrent chemoradiation 12/28/2023 to 2/6/2024 with with capecitabine     CT A/P 2/12/24:  Asymmetric wall  thickening of the rectum appears worse when compared to prior study. Previous a, only the left dorsolateral aspect of the rectum was previously involved. Currently, the dorsal and lateral aspects are not thickened. It is unclear if this is related to progression of disease and/or related to reactive changes from therapy. Slight interval decrease in the size of a 6.7 mm normal-sized lymph node in the left parasagittal presacral soft tissues, previously measuring 8.8 mm. 13 x 9 mm enlarged lymph node lying along the posterior aspect of the distal esophagus appears new when compared to the prior examination. The clinical significance of this finding is unclear. At least one of the lymph nodes located previously along the lesser curvature of the stomach is decreased in size as described above. Other lymph nodes in this region are normal in size and stable in appearance. Unchanged mesenteric panniculitis.     He began systemic chemotherapy with CapeOx on 2/23/24- he developed Covid after the first cycle so cycle 2 was delayed. Planning 4 cycles total (12 weeks) then discuss surgery. He has now completed VONDA.    CT A/P 5/17/24:  IMPRESSION:    Most likely post-therapeutic diffuse mucosal edema of the rectum.  Resolved lymph nodes in the mesorectal fat. No new suspicious  adenopathy. No evidence of new distant metastatic disease. Continued  follow-up is requested.    MRI Rectum 6/13/24  IMPRESSION:   1. There has been a positive response to treatment, with a corresponding tumor regression grade of mrTRG-2 .  The measurable mass has almost completely resolved with some minimal residual tumor signal suspected on the background of rectal wall edema.  2. Previously measured lymph nodes have decreased in size, now not enlarged by size criteria.  3. Wall thickening with marked edema of the rectosigmoid colon and adjacent pericolonic and pelvic fat with small volume of ascites in the pelvis, presumably related to chemoradiation  therapy.  4. Circumferential wall thickening of the bladder noted. This may be part due to degree of distention however could also be related to chemotherapy radiation therapy.                 Component  Ref Range & Units  2 wk ago  (5/3/24) 1 mo ago  (4/9/24) 2 mo ago  (3/22/24) 2 mo ago  (2/23/24) 3 mo ago  (1/25/24) 6 mo ago  (11/22/23)    CEA  ng/mL pending 4.4 4.2 CM 4.2 CM 2.5 CM 5.3 CM 5.8 CM        Follow up MRI Rectum (8/29/24): TRG1 response    MR Rectum (11/21/24) pending    Medical history:  -none    Surgical history:  -none    Family history:  No Hx of IBD or GI malignancy    Medications:  Current Outpatient Medications   Medication Sig Dispense Refill     DULoxetine (CYMBALTA) 30 MG capsule Take 1 capsule (30 mg) by mouth daily. 45 capsule 0     ibuprofen (ADVIL/MOTRIN) 200 MG tablet Take 200 mg by mouth daily (Patient not taking: Reported on 6/7/2024)       loperamide (IMODIUM) 2 MG capsule Start with 2 caps (4 mg), then take one cap (2 mg) after each diarrheal stool as needed. Do not use more than 8 caps (16 mg) per day. (Patient not taking: Reported on 6/7/2024) 30 capsule 0     ondansetron (ZOFRAN) 8 MG tablet Take 1 tablet (8 mg) by mouth every 8 hours as needed for nausea (vomiting) (Patient not taking: Reported on 6/7/2024) 30 tablet 2     prochlorperazine (COMPAZINE) 10 MG tablet Take 1 tablet (10 mg) by mouth every 6 hours as needed for nausea or vomiting (Patient not taking: Reported on 6/7/2024) 30 tablet 2     senna-docusate (SENOKOT-S/PERICOLACE) 8.6-50 MG tablet Take 1 tablet by mouth 2 times daily (Patient not taking: Reported on 9/18/2024)       sildenafil (VIAGRA) 100 MG tablet  (Patient not taking: Reported on 6/7/2024)       triamterene-HCTZ (DYAZIDE) 37.5-25 MG capsule Take 1 capsule by mouth every morning       urea (CARMOL) 10 % external cream Apply topically as needed for dry skin (Patient not taking: Reported on 8/29/2024) 85 g 1       Allergies:  Allergies   Allergen Reactions      Penicillins      Childhood       Social history:   Social History     Tobacco Use     Smoking status: Never     Smokeless tobacco: Former     Quit date: 1990     Tobacco comments:     Chewed for 10-15 years.    Substance Use Topics     Alcohol use: Not Currently     Comment: rarely     Marital status: unknown.    ROS:  A complete review of systems was performed with the patient and all systems negative except as per HPI.    Physical Examination:  Exam was chaperoned by Joanna Meredith CMA  There were no vitals taken for this visit.  General: Well hydrated. No acute distress.  Abdomen: Soft, NT, ND, well healed midline incision from prior trauma.. No inguinal adenopathy palpated.  Perianal external examination:  Perianal skin: intact.  Lesions: No.  Eversion of buttocks: There was not evidence of an anal fissure. Details: N/A.  Skin tags or external hemorrhoids: None.  Digital rectal examination: Was performed.   Sphincter tone: Good.  Palpable lesions: No.  Other: None.  Bimanual examination: was not performed.    Anoscopy: Was performed.   Hemorrhoids: No significant internal hemorrhoids.  Lesions: No    Procedures:  Prior to the start of the procedure and with procedural staff participation, I verbally confirmed the patient s identity using two indicators, relevant allergies, that the procedure was appropriate and matched the consent or emergent situation, and that the correct equipment/implants were available. Immediately prior to starting the procedure I conducted the Time Out with the procedural staff and re-confirmed the patient s name, procedure, and site/side. (The Joint Commission universal protocol was followed.)  Yes    Sedation (Moderate or Deep): None    Flexible sigmoidoscopy was performed to 30 cm. No evidence of any residual tumor present, passed multiple times through rectum. Scar at low rectal valve seen, healthy and clean. Pictures taken and uploaded.     ASSESSMENT  1. Rectal cancer  2.  Hypertension    PLAN    Follow up per Watch and Wait Protocol:   Completed CRT: May 23, 2024  Flexible sigmoidoscopy   Every 3 months for 6 months: Until Nov '24  Every 4 months for 3 years: Until Nov '27, next due March '25  Every 6 months for 5 years: Until Nov '29  Every year for 10 years: Until Nov '34    3T MRI of pelvis  6-8 weeks after CRT:  Every 4 months for 2 years: Until Nov '26, next due March '25  Every 6 months for 2 years: Until Nov '28  Yearly for 2 years: Until Nov '30    CT CAP  Every year for 6-8 years    Colonoscopy   Plan in Fall 2025    CEA at every clinic or endoscopic visit      Risks, benefits, and alternatives of operative treatment were thoroughly discussed with the patient, he understands these well and agrees to proceed.    Time spent: 45 minutes, an additional 15 minutes were spent doing flex sig procedure.  >50% spent in discussing, counseling and coordinating care.    Tommy Rodriguez M.D    Division of Colon and Rectal Surgery  [facility]    Referring Provider:  Greg Waddell MD  [address] [place] [id number]     Primary Care Provider:  No Ref-Primary, Physician      Again, thank you for allowing me to participate in the care of your patient.      Sincerely,    Tommy Rodriguez MD, MD

## 2024-11-26 ENCOUNTER — HOSPITAL ENCOUNTER (OUTPATIENT)
Dept: CT IMAGING | Facility: HOSPITAL | Age: 74
Discharge: HOME | End: 2024-11-26
Attending: INTERNAL MEDICINE
Payer: MEDICARE

## 2024-11-26 DIAGNOSIS — C20 RECTAL CANCER (H): ICD-10-CM

## 2024-11-26 PROCEDURE — 71260 CT THORAX DX C+: CPT | Mod: MG

## 2024-11-26 PROCEDURE — G1010 CDSM STANSON: HCPCS

## 2024-11-26 PROCEDURE — 250N000011 HC RX IP 250 OP 636: Performed by: RADIOLOGY

## 2024-11-26 RX ORDER — IOPAMIDOL 755 MG/ML
70 INJECTION, SOLUTION INTRAVASCULAR ONCE
Status: COMPLETED | OUTPATIENT
Start: 2024-11-26 | End: 2024-11-26

## 2024-11-26 RX ADMIN — IOPAMIDOL 70 ML: 755 INJECTION, SOLUTION INTRAVENOUS at 10:56

## 2024-12-11 ENCOUNTER — TELEPHONE (OUTPATIENT)
Dept: SURGERY | Facility: CLINIC | Age: 74
End: 2024-12-11
Payer: MEDICARE

## 2024-12-11 NOTE — TELEPHONE ENCOUNTER
Left Voicemail (1st Attempt) and Sent Mychart (1st Attempt) for the patient to call back and schedule the following:    Appointment type: Imaging  Provider: Radiology  Return date: 03/2025  Specialty phone number: [phone]  Additional appointment(s) needed: n/a  Additonal Notes: MRI 3T Rectum

## 2025-01-16 ENCOUNTER — TELEPHONE (OUTPATIENT)
Dept: GASTROENTEROLOGY | Facility: CLINIC | Age: 75
End: 2025-01-16
Payer: MEDICARE

## 2025-01-16 NOTE — TELEPHONE ENCOUNTER
"Endoscopy Scheduling Screen    Have you had any respiratory illness or flu-like symptoms in the last 10 days?  No    What is your communication preference for Instructions and/or Bowel Prep?   MyChart    What insurance is in the chart?  Other:  [employer]    Ordering/Referring Provider: Tommy Rodriguez MD     (If ordering provider performs procedure, schedule with ordering provider unless otherwise instructed. )    BMI: Estimated body mass index is 32.64 kg/m  as calculated from the following:    Height as of 11/21/24: 1.702 m (5' 7\").    Weight as of 11/21/24: 94.5 kg (208 lb 6.4 oz).     Sedation Ordered  moderate sedation.   If patient BMI > 50 do not schedule in [facility].    If patient BMI > 45 do not schedule at [facility].    Are you taking methadone or Suboxone?  NO, No RN review required.    Have you been diagnosed and are being treated for severe PTSD or severe anxiety?  NO, No RN review required.    Are you taking any prescription medications for pain 3 or more times per week?   NO, No RN review required.    Do you have a history of malignant hyperthermia?  No    (Females) Are you currently pregnant?   No     Have you been diagnosed or told you have pulmonary hypertension?   No    Do you have an LVAD?  No    Have you been told you have moderate to severe sleep apnea?  No.    Have you been told you have COPD, asthma, or any other lung disease?  No    Do you have any heart conditions?  No     Have you ever had or are you waiting for an organ transplant?  No. Continue scheduling, no site restrictions.    Have you had a stroke or transient ischemic attack (TIA aka \"mini stroke\" in the last 6 months?   No    Have you been diagnosed with or been told you have cirrhosis of the liver?   No.    Are you currently on dialysis?   No    Do you need assistance transferring?   No    BMI: Estimated body mass index is 32.64 kg/m  as calculated from the following:    Height as of 11/21/24: 1.702 m (5' 7\").    Weight as of 11/21/24: 94.5 " kg (208 lb 6.4 oz).     Is patients BMI > 40 and scheduling location [facility]?  No    Do you take an injectable or oral medication for weight loss or diabetes (excluding insulin)?  No    Do you take the medication Naltrexone?  No    Do you take blood thinners?  No       Prep   Are you currently on dialysis or do you have chronic kidney disease?  No    Do you have a diagnosis of diabetes?  No    Do you have a diagnosis of cystic fibrosis (CF)?  No    On a regular basis do you go 3 -5 days between bowel movements?  No    BMI > 40?  No    Preferred Pharmacy:    [id number] - [place], [place] - [address] AT [facility]  [address]  [address]  [facility] [id number]  Phone: [phone] Fax: [phone]      Scheduling incomplete. To be completed by Juvenal

## 2025-03-09 ENCOUNTER — HEALTH MAINTENANCE LETTER (OUTPATIENT)
Age: 75
End: 2025-03-09

## 2025-03-26 ENCOUNTER — LAB (OUTPATIENT)
Dept: LAB | Facility: OTHER | Age: 75
End: 2025-03-26
Attending: INTERNAL MEDICINE
Payer: MEDICARE

## 2025-03-26 ENCOUNTER — HOSPITAL ENCOUNTER (OUTPATIENT)
Dept: CT IMAGING | Facility: HOSPITAL | Age: 75
Discharge: HOME | End: 2025-03-26
Attending: INTERNAL MEDICINE
Payer: MEDICARE

## 2025-03-26 DIAGNOSIS — C20 RECTAL CANCER (H): ICD-10-CM

## 2025-03-26 LAB
ALBUMIN SERPL BCG-MCNC: 4.4 G/DL (ref 3.5–5.2)
ALP SERPL-CCNC: 76 U/L (ref 40–150)
ALT SERPL W P-5'-P-CCNC: 20 U/L (ref 0–70)
ANION GAP SERPL CALCULATED.3IONS-SCNC: 11 MMOL/L (ref 7–15)
AST SERPL W P-5'-P-CCNC: 30 U/L (ref 0–45)
BASOPHILS # BLD AUTO: 0.1 10E3/UL (ref 0–0.2)
BASOPHILS NFR BLD AUTO: 1 %
BILIRUB SERPL-MCNC: 0.4 MG/DL
BUN SERPL-MCNC: 23.7 MG/DL (ref 8–23)
CALCIUM SERPL-MCNC: 9.1 MG/DL (ref 8.8–10.4)
CEA SERPL-MCNC: 2 NG/ML
CHLORIDE SERPL-SCNC: 102 MMOL/L (ref 98–107)
CREAT SERPL-MCNC: 1.19 MG/DL (ref 0.67–1.17)
EGFRCR SERPLBLD CKD-EPI 2021: 64 ML/MIN/1.73M2
EOSINOPHIL # BLD AUTO: 0.5 10E3/UL (ref 0–0.7)
EOSINOPHIL NFR BLD AUTO: 8 %
ERYTHROCYTE [DISTWIDTH] IN BLOOD BY AUTOMATED COUNT: 12.5 % (ref 10–15)
GLUCOSE SERPL-MCNC: 122 MG/DL (ref 70–99)
HCO3 SERPL-SCNC: 27 MMOL/L (ref 22–29)
HCT VFR BLD AUTO: 43.7 % (ref 40–53)
HGB BLD-MCNC: 15.2 G/DL (ref 13.3–17.7)
IMM GRANULOCYTES # BLD: 0 10E3/UL
IMM GRANULOCYTES NFR BLD: 0 %
LYMPHOCYTES # BLD AUTO: 0.8 10E3/UL (ref 0.8–5.3)
LYMPHOCYTES NFR BLD AUTO: 14 %
MAGNESIUM SERPL-MCNC: 2.3 MG/DL (ref 1.7–2.3)
MCH RBC QN AUTO: 30.8 PG (ref 26.5–33)
MCHC RBC AUTO-ENTMCNC: 34.8 G/DL (ref 31.5–36.5)
MCV RBC AUTO: 89 FL (ref 78–100)
MONOCYTES # BLD AUTO: 0.7 10E3/UL (ref 0–1.3)
MONOCYTES NFR BLD AUTO: 12 %
NEUTROPHILS # BLD AUTO: 3.9 10E3/UL (ref 1.6–8.3)
NEUTROPHILS NFR BLD AUTO: 65 %
NRBC # BLD AUTO: 0 10E3/UL
NRBC BLD AUTO-RTO: 0 /100
PLATELET # BLD AUTO: 194 10E3/UL (ref 150–450)
POTASSIUM SERPL-SCNC: 3.7 MMOL/L (ref 3.4–5.3)
PROT SERPL-MCNC: 6.9 G/DL (ref 6.4–8.3)
RBC # BLD AUTO: 4.93 10E6/UL (ref 4.4–5.9)
SODIUM SERPL-SCNC: 140 MMOL/L (ref 135–145)
WBC # BLD AUTO: 6 10E3/UL (ref 4–11)

## 2025-03-26 PROCEDURE — 250N000011 HC RX IP 250 OP 636: Performed by: INTERNAL MEDICINE

## 2025-03-26 PROCEDURE — 36415 COLL VENOUS BLD VENIPUNCTURE: CPT | Mod: ZL

## 2025-03-26 PROCEDURE — 85004 AUTOMATED DIFF WBC COUNT: CPT | Mod: ZL

## 2025-03-26 PROCEDURE — 82435 ASSAY OF BLOOD CHLORIDE: CPT | Mod: ZL

## 2025-03-26 PROCEDURE — 74177 CT ABD & PELVIS W/CONTRAST: CPT

## 2025-03-26 PROCEDURE — 85014 HEMATOCRIT: CPT | Mod: ZL

## 2025-03-26 PROCEDURE — 82378 CARCINOEMBRYONIC ANTIGEN: CPT | Mod: ZL

## 2025-03-26 PROCEDURE — 82040 ASSAY OF SERUM ALBUMIN: CPT | Mod: ZL

## 2025-03-26 PROCEDURE — 83735 ASSAY OF MAGNESIUM: CPT | Mod: ZL

## 2025-03-26 RX ORDER — IOPAMIDOL 755 MG/ML
99 INJECTION, SOLUTION INTRAVASCULAR ONCE
Status: DISCONTINUED | OUTPATIENT
Start: 2025-03-26 | End: 2025-03-26

## 2025-03-26 RX ORDER — IOPAMIDOL 755 MG/ML
99 INJECTION, SOLUTION INTRAVASCULAR ONCE
Status: COMPLETED | OUTPATIENT
Start: 2025-03-26 | End: 2025-03-26

## 2025-03-26 RX ADMIN — IOPAMIDOL 99 ML: 755 INJECTION, SOLUTION INTRAVENOUS at 11:15

## 2025-04-03 ENCOUNTER — ONCOLOGY VISIT (OUTPATIENT)
Dept: ONCOLOGY | Facility: OTHER | Age: 75
End: 2025-04-03
Attending: INTERNAL MEDICINE
Payer: MEDICARE

## 2025-04-03 VITALS
RESPIRATION RATE: 20 BRPM | HEIGHT: 67 IN | WEIGHT: 221.78 LBS | BODY MASS INDEX: 34.81 KG/M2 | OXYGEN SATURATION: 95 % | HEART RATE: 61 BPM | SYSTOLIC BLOOD PRESSURE: 140 MMHG | DIASTOLIC BLOOD PRESSURE: 80 MMHG | TEMPERATURE: 96.3 F

## 2025-04-03 DIAGNOSIS — C20 RECTAL CANCER (H): Primary | ICD-10-CM

## 2025-04-03 PROCEDURE — G0463 HOSPITAL OUTPT CLINIC VISIT: HCPCS

## 2025-04-03 ASSESSMENT — PAIN SCALES - GENERAL: PAINLEVEL_OUTOF10: NO PAIN (0)
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
## 2025-04-09 ENCOUNTER — TELEPHONE (OUTPATIENT)
Dept: GASTROENTEROLOGY | Facility: CLINIC | Age: 75
End: 2025-04-09
Payer: MEDICARE

## 2025-04-09 NOTE — TELEPHONE ENCOUNTER
Bowel Prep Review:  Disclaimer: No call was made to the patient.     Standard Miralax bowel prep.    Recommended due to standard bowel prep.   Instructions were sent via [employer].       Namrata Flores LPN  Endoscopy Procedure Pre Assessment

## 2025-04-10 NOTE — TELEPHONE ENCOUNTER
"Incoming call received from pt regarding the prep for his colonoscopy and his MRI.    Note sent to [facility] to assist pt.     \"Pt wants to r/s his MRI that he said Dr. Rodriguez wants him to complete, [place]  He stated he cannot do the last bit of colon prep and have an MRI on the same day.    Pt wanted Dr. Rodriguez's nurse to reach out to him to help him do this.  Are either of you able to help this pt out with this?     He is currently scheduled for \"MR RECTUM WO & W CONTRAST\" on 4-28-25 with arrival of 0830.\"    Flores Isaac RN Holler, Maria A, RN; Theodora Jean, RN  I told him it has to be here but I'll call him tomorrow to re-iterate      Haley Hanson RN   Pre-procedure assessment RN  "

## 2025-04-14 ENCOUNTER — MYC MEDICAL ADVICE (OUTPATIENT)
Dept: SURGERY | Facility: CLINIC | Age: 75
End: 2025-04-14
Payer: MEDICARE

## 2025-04-28 ENCOUNTER — HOSPITAL ENCOUNTER (OUTPATIENT)
Facility: CLINIC | Age: 75
Discharge: HOME | End: 2025-04-28
Attending: SURGERY | Admitting: SURGERY
Payer: MEDICARE

## 2025-04-28 ENCOUNTER — ANCILLARY PROCEDURE (OUTPATIENT)
Dept: MRI IMAGING | Facility: CLINIC | Age: 75
End: 2025-04-28
Attending: SURGERY
Payer: MEDICARE

## 2025-04-28 VITALS
SYSTOLIC BLOOD PRESSURE: 137 MMHG | RESPIRATION RATE: 16 BRPM | DIASTOLIC BLOOD PRESSURE: 78 MMHG | OXYGEN SATURATION: 97 % | HEART RATE: 46 BPM

## 2025-04-28 DIAGNOSIS — C20 RECTAL CANCER (H): ICD-10-CM

## 2025-04-28 LAB — COLONOSCOPY: NORMAL

## 2025-04-28 PROCEDURE — G0105 COLORECTAL SCRN; HI RISK IND: HCPCS

## 2025-04-28 PROCEDURE — 250N000011 HC RX IP 250 OP 636: Performed by: SURGERY

## 2025-04-28 PROCEDURE — A9585 GADOBUTROL INJECTION: HCPCS | Mod: JZ | Performed by: RADIOLOGY

## 2025-04-28 PROCEDURE — G0500 MOD SEDAT ENDO SERVICE >5YRS: HCPCS | Performed by: SURGERY

## 2025-04-28 PROCEDURE — 45378 DIAGNOSTIC COLONOSCOPY: CPT | Performed by: SURGERY

## 2025-04-28 PROCEDURE — 99153 MOD SED SAME PHYS/QHP EA: CPT | Performed by: SURGERY

## 2025-04-28 PROCEDURE — 72197 MRI PELVIS W/O & W/DYE: CPT | Performed by: RADIOLOGY

## 2025-04-28 RX ORDER — NALOXONE HYDROCHLORIDE 0.4 MG/ML
0.2 INJECTION, SOLUTION INTRAMUSCULAR; INTRAVENOUS; SUBCUTANEOUS
Status: DISCONTINUED | OUTPATIENT
Start: 2025-04-28 | End: 2025-04-28 | Stop reason: HOSPADM

## 2025-04-28 RX ORDER — NALOXONE HYDROCHLORIDE 0.4 MG/ML
0.4 INJECTION, SOLUTION INTRAMUSCULAR; INTRAVENOUS; SUBCUTANEOUS
Status: DISCONTINUED | OUTPATIENT
Start: 2025-04-28 | End: 2025-04-28 | Stop reason: HOSPADM

## 2025-04-28 RX ORDER — LIDOCAINE 40 MG/G
CREAM TOPICAL
Status: DISCONTINUED | OUTPATIENT
Start: 2025-04-28 | End: 2025-04-28 | Stop reason: HOSPADM

## 2025-04-28 RX ORDER — ONDANSETRON 2 MG/ML
4 INJECTION INTRAMUSCULAR; INTRAVENOUS
Status: DISCONTINUED | OUTPATIENT
Start: 2025-04-28 | End: 2025-04-28 | Stop reason: HOSPADM

## 2025-04-28 RX ORDER — FENTANYL CITRATE 50 UG/ML
INJECTION, SOLUTION INTRAMUSCULAR; INTRAVENOUS PRN
Status: DISCONTINUED | OUTPATIENT
Start: 2025-04-28 | End: 2025-04-28 | Stop reason: HOSPADM

## 2025-04-28 RX ORDER — FLUMAZENIL 0.1 MG/ML
0.2 INJECTION, SOLUTION INTRAVENOUS
Status: DISCONTINUED | OUTPATIENT
Start: 2025-04-28 | End: 2025-04-28 | Stop reason: HOSPADM

## 2025-04-28 RX ORDER — PROCHLORPERAZINE MALEATE 5 MG/1
5 TABLET ORAL EVERY 6 HOURS PRN
Status: DISCONTINUED | OUTPATIENT
Start: 2025-04-28 | End: 2025-04-28 | Stop reason: HOSPADM

## 2025-04-28 RX ORDER — ONDANSETRON 2 MG/ML
4 INJECTION INTRAMUSCULAR; INTRAVENOUS EVERY 6 HOURS PRN
Status: DISCONTINUED | OUTPATIENT
Start: 2025-04-28 | End: 2025-04-28 | Stop reason: HOSPADM

## 2025-04-28 RX ORDER — GADOBUTROL 604.72 MG/ML
10 INJECTION INTRAVENOUS ONCE
Status: COMPLETED | OUTPATIENT
Start: 2025-04-28 | End: 2025-04-28

## 2025-04-28 RX ORDER — ONDANSETRON 4 MG/1
4 TABLET, ORALLY DISINTEGRATING ORAL EVERY 6 HOURS PRN
Status: DISCONTINUED | OUTPATIENT
Start: 2025-04-28 | End: 2025-04-28 | Stop reason: HOSPADM

## 2025-04-28 RX ADMIN — GADOBUTROL 10 ML: 604.72 INJECTION INTRAVENOUS at 17:49

## 2025-04-28 ASSESSMENT — ACTIVITIES OF DAILY LIVING (ADL)
ADLS_ACUITY_SCORE: 41
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
## 2025-04-28 NOTE — DISCHARGE INSTRUCTIONS
MRI Contrast Discharge Instructions    The IV contrast you received today will pass out of your body in your  urine. This will happen in the next 24 hours. You will not feel this process.  Your urine will not change color.    Drink at least 4 extra glasses of water or juice today (unless your doctor  has restricted your fluids). This reduces the stress on your kidneys.  You may take your regular medicines.    If you are on dialysis: It is best to have dialysis today.    If you have a reaction: Most reactions happen right away. If you have  any new symptoms after leaving the hospital (such as hives or swelling),  call your hospital at the correct number below. Or call your family doctor.  If you have breathing distress or wheezing, call 911.    Special instructions: ***    I have read and understand the above information.    Signature:______________________________________ Date:___________    Staff:__________________________________________ Date:___________     Time:__________    [facility] Departments:    ___[place]: [phone]  ___[facility]: [phone]  ___[place]: [phone] ___[facility]: [phone]  ___[facility]: [phone]  ___[facility]: [phone]  ___[phone]  ___[facility]: [phone]  ___[phone]

## 2025-06-30 DIAGNOSIS — C20 RECTAL CANCER (H): Primary | ICD-10-CM

## 2025-07-01 ENCOUNTER — TELEPHONE (OUTPATIENT)
Dept: SURGERY | Facility: CLINIC | Age: 75
End: 2025-07-01
Payer: MEDICARE

## 2025-07-01 NOTE — TELEPHONE ENCOUNTER
Patient confirmed scheduled appointment:  Date: 8/28/25  Time: 700 am   Visit type: Imaging  Provider: Radiology   Location: [facility]  Testing/imaging: MRI 3T Rectum   Additional notes: ordered by      Patient confirmed scheduled appointment:  Date: 8/28/25  Time: 930 am   Visit type: Flex Sig   Provider:    Location: [facility]  Testing/imaging: n/a  Additional notes: Flex Sig

## 2025-08-18 ENCOUNTER — TELEPHONE (OUTPATIENT)
Dept: FAMILY MEDICINE | Facility: OTHER | Age: 75
End: 2025-08-18

## 2025-08-20 ENCOUNTER — DOCUMENTATION ONLY (OUTPATIENT)
Dept: OTHER | Facility: CLINIC | Age: 75
End: 2025-08-20
Payer: MEDICARE

## 2025-08-25 ENCOUNTER — OFFICE VISIT (OUTPATIENT)
Dept: FAMILY MEDICINE | Facility: OTHER | Age: 75
End: 2025-08-25
Attending: STUDENT IN AN ORGANIZED HEALTH CARE EDUCATION/TRAINING PROGRAM
Payer: MEDICARE

## 2025-08-25 ENCOUNTER — MYC MEDICAL ADVICE (OUTPATIENT)
Dept: SURGERY | Facility: CLINIC | Age: 75
End: 2025-08-25
Payer: MEDICARE

## 2025-08-25 VITALS
SYSTOLIC BLOOD PRESSURE: 154 MMHG | HEIGHT: 67 IN | BODY MASS INDEX: 34.78 KG/M2 | OXYGEN SATURATION: 95 % | DIASTOLIC BLOOD PRESSURE: 80 MMHG | TEMPERATURE: 97.2 F | WEIGHT: 221.6 LBS | HEART RATE: 60 BPM

## 2025-08-25 DIAGNOSIS — L82.0 INFLAMED SEBORRHEIC KERATOSIS: Primary | ICD-10-CM

## 2025-08-25 PROCEDURE — G0463 HOSPITAL OUTPT CLINIC VISIT: HCPCS

## 2025-08-25 ASSESSMENT — PAIN SCALES - GENERAL: PAINLEVEL_OUTOF10: NO PAIN (0)

## 2025-08-28 ENCOUNTER — ANCILLARY PROCEDURE (OUTPATIENT)
Dept: MRI IMAGING | Facility: CLINIC | Age: 75
End: 2025-08-28
Attending: COLON & RECTAL SURGERY
Payer: MEDICARE

## 2025-08-28 ENCOUNTER — OFFICE VISIT (OUTPATIENT)
Dept: SURGERY | Facility: CLINIC | Age: 75
End: 2025-08-28
Payer: MEDICARE

## 2025-08-28 VITALS
DIASTOLIC BLOOD PRESSURE: 85 MMHG | SYSTOLIC BLOOD PRESSURE: 158 MMHG | WEIGHT: 220.3 LBS | HEART RATE: 52 BPM | HEIGHT: 67 IN | OXYGEN SATURATION: 95 % | BODY MASS INDEX: 34.58 KG/M2

## 2025-08-28 DIAGNOSIS — C20 RECTAL CANCER (H): Primary | ICD-10-CM

## 2025-08-28 DIAGNOSIS — C20 RECTAL CANCER (H): ICD-10-CM

## 2025-08-28 PROCEDURE — 72197 MRI PELVIS W/O & W/DYE: CPT | Performed by: RADIOLOGY

## 2025-08-28 PROCEDURE — A9585 GADOBUTROL INJECTION: HCPCS | Mod: JZ | Performed by: RADIOLOGY

## 2025-08-28 RX ORDER — GADOBUTROL 604.72 MG/ML
10 INJECTION INTRAVENOUS ONCE
Status: COMPLETED | OUTPATIENT
Start: 2025-08-28 | End: 2025-08-28

## 2025-08-28 RX ADMIN — GADOBUTROL 10 ML: 604.72 INJECTION INTRAVENOUS at 07:34

## 2025-08-28 ASSESSMENT — PAIN SCALES - GENERAL: PAINLEVEL_OUTOF10: NO PAIN (0)

## (undated) DEVICE — ENDO SNARE EXACTO COLD 9MM LOOP 2.4MMX230CM 00711115

## (undated) DEVICE — TUBING SUCTION 20FT N620A

## (undated) DEVICE — SOL WATER IRRIG 1000ML BOTTLE 2F7114

## (undated) DEVICE — CONNECTOR ERBEFLO 2 PORT 20325-215

## (undated) DEVICE — ENDO TRAP POLYP E-TRAP 00711099

## (undated) DEVICE — FORCEPS BIOPSY RADIAL JAW 4 LARGE W/NEEDLE 240CM M00513332

## (undated) DEVICE — CANISTER SUCTION MEDI-VAC GUARDIAN 2000ML 90D 65651-220

## (undated) RX ORDER — PROPOFOL 10 MG/ML
INJECTION, EMULSION INTRAVENOUS
Status: DISPENSED
Start: 2023-11-20

## (undated) RX ORDER — FENTANYL CITRATE 50 UG/ML
INJECTION, SOLUTION INTRAMUSCULAR; INTRAVENOUS
Status: DISPENSED
Start: 2025-04-28

## (undated) RX ORDER — GLYCOPYRROLATE 0.2 MG/ML
INJECTION, SOLUTION INTRAMUSCULAR; INTRAVENOUS
Status: DISPENSED
Start: 2023-11-20